# Patient Record
Sex: MALE | Race: BLACK OR AFRICAN AMERICAN | NOT HISPANIC OR LATINO | Employment: UNEMPLOYED | URBAN - METROPOLITAN AREA
[De-identification: names, ages, dates, MRNs, and addresses within clinical notes are randomized per-mention and may not be internally consistent; named-entity substitution may affect disease eponyms.]

---

## 2018-01-07 ENCOUNTER — HOSPITAL ENCOUNTER (EMERGENCY)
Facility: HOSPITAL | Age: 38
Discharge: HOME | End: 2018-01-07
Attending: EMERGENCY MEDICINE
Payer: MEDICAID

## 2018-01-07 VITALS
HEIGHT: 73 IN | HEART RATE: 92 BPM | TEMPERATURE: 98 F | WEIGHT: 170 LBS | BODY MASS INDEX: 22.53 KG/M2 | DIASTOLIC BLOOD PRESSURE: 78 MMHG | SYSTOLIC BLOOD PRESSURE: 118 MMHG | OXYGEN SATURATION: 99 % | RESPIRATION RATE: 17 BRPM
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
## 2018-01-07 DIAGNOSIS — T42.4X1A BENZODIAZEPINE OVERDOSE, ACCIDENTAL OR UNINTENTIONAL, INITIAL ENCOUNTER: ICD-10-CM

## 2018-01-07 DIAGNOSIS — T07.XXXA MULTIPLE ABRASIONS: Primary | ICD-10-CM

## 2018-01-07 PROCEDURE — 63600175 PHARM REV CODE 636 W HCPCS: Performed by: EMERGENCY MEDICINE

## 2018-01-07 PROCEDURE — 90715 TDAP VACCINE 7 YRS/> IM: CPT | Performed by: EMERGENCY MEDICINE

## 2018-01-07 PROCEDURE — 99284 EMERGENCY DEPT VISIT MOD MDM: CPT

## 2018-01-07 PROCEDURE — 90471 IMMUNIZATION ADMIN: CPT | Performed by: EMERGENCY MEDICINE

## 2018-01-07 RX ORDER — SULFAMETHOXAZOLE AND TRIMETHOPRIM 800; 160 MG/1; MG/1
1 TABLET ORAL
Status: ON HOLD | COMMUNITY
End: 2019-08-29 | Stop reason: HOSPADM

## 2018-01-07 RX ORDER — SPIRONOLACTONE 25 MG/1
25 TABLET ORAL
COMMUNITY
End: 2019-09-05

## 2018-01-07 RX ORDER — METFORMIN HYDROCHLORIDE 500 MG/1
500 TABLET ORAL 2 TIMES DAILY WITH MEALS
COMMUNITY
End: 2018-03-04

## 2018-01-07 RX ORDER — LANOLIN ALCOHOL/MO/W.PET/CERES
100 CREAM (GRAM) TOPICAL DAILY
COMMUNITY
End: 2020-02-07 | Stop reason: CLARIF

## 2018-01-07 RX ORDER — ACETAMINOPHEN 500 MG/1
1 CAPSULE, LIQUID FILLED ORAL 4 TIMES DAILY PRN
Status: ON HOLD | COMMUNITY
End: 2019-08-29 | Stop reason: HOSPADM

## 2018-01-07 RX ORDER — PANTOPRAZOLE SODIUM 40 MG/1
40 TABLET, DELAYED RELEASE ORAL DAILY
COMMUNITY
End: 2019-09-05

## 2018-01-07 RX ORDER — TRAZODONE HYDROCHLORIDE 50 MG/1
50 TABLET ORAL NIGHTLY
COMMUNITY
End: 2019-09-05

## 2018-01-07 RX ORDER — LACTULOSE 10 G/15ML
10 SOLUTION ORAL 3 TIMES DAILY
COMMUNITY
End: 2019-09-05

## 2018-01-07 RX ORDER — FUROSEMIDE 20 MG/1
20 TABLET ORAL
COMMUNITY
End: 2019-09-05

## 2018-01-07 RX ORDER — FOLIC ACID 1 MG/1
1 TABLET ORAL DAILY
COMMUNITY
End: 2020-02-07 | Stop reason: CLARIF

## 2018-01-07 RX ADMIN — CLOSTRIDIUM TETANI TOXOID ANTIGEN (FORMALDEHYDE INACTIVATED), CORYNEBACTERIUM DIPHTHERIAE TOXOID ANTIGEN (FORMALDEHYDE INACTIVATED), BORDETELLA PERTUSSIS TOXOID ANTIGEN (GLUTARALDEHYDE INACTIVATED), BORDETELLA PERTUSSIS FILAMENTOUS HEMAGGLUTININ ANTIGEN (FORMALDEHYDE INACTIVATED), BORDETELLA PERTUSSIS PERTACTIN ANTIGEN, AND BORDETELLA PERTUSSIS FIMBRIAE 2/3 ANTIGEN 0.5 ML: 5; 2; 2.5; 5; 3; 5 INJECTION, SUSPENSION INTRAMUSCULAR at 12:01

## 2018-01-07 NOTE — ED NOTES
Pts fiance here to drive pt home. All of pts belongings and medications sent home with pt. Dc and follow up discussed with pt. Pt verbalized understanding. All questions answered. No needs voiced at this time. Pt amb out of ed in nad. Skin pink, warm and dry.

## 2018-01-07 NOTE — ED PROVIDER NOTES
Encounter Date: 1/6/2018    SCRIBE #1 NOTE: ITeresa, anahi scribing for, and in the presence of, Dr. Daly .       History     Chief Complaint   Patient presents with    Fall     Nose and Rt knee pain.  A and O x 4 at present       01/07/2018 1:02 AM     Chief complaint: drug abuse      Flako Quintana is a 37 y.o. male who presents to the ED via EMS with altered mental status secondary to drug abuse. Patient was at home when his girlfriend heard him fall and called EMS because he was not speaking clearly. EMS states patient states he took 6 Xanax because his girlfriend was yelling at him. Patient is a poor historian due to acuity of condition.        The history is provided by the patient and the EMS personnel. No  was used.     Review of patient's allergies indicates:  No Known Allergies  Past Medical History:   Diagnosis Date    Diabetes mellitus      History reviewed. No pertinent surgical history.  History reviewed. No pertinent family history.  Social History   Substance Use Topics    Smoking status: Current Some Day Smoker     Types: Vaping w/o nicotine    Smokeless tobacco: Never Used    Alcohol use Yes     Review of Systems   Reason unable to perform ROS: acuity of condition.       Physical Exam     Initial Vitals [01/07/18 0011]   BP Pulse Resp Temp SpO2   108/65 93 17 97.9 °F (36.6 °C) 100 %      MAP       79.33         Physical Exam    Constitutional: He appears well-nourished.   Patient is drowsy but arousal. Patient arrived in C-Spine immobilization.    HENT:   Head: Normocephalic.   Superficial skin tear to nasal bridge without deviation, bruising, or septal hematoma.    Eyes: Conjunctivae and EOM are normal. Pupils are equal, round, and reactive to light.   Neck: Normal range of motion. Neck supple. No thyroid mass present.   Cardiovascular: Normal rate and regular rhythm.   Abdominal: Soft. Normal appearance and bowel sounds are normal. There is no tenderness.    Neurological: He is alert. He has normal strength. No cranial nerve deficit or sensory deficit.   Skin: Skin is warm and dry. No rash noted. No erythema.   2cm skin tear to R knee without any associated swelling or bruising.       Psychiatric: His speech is normal. Cognition and memory are normal.         ED Course   Procedures  Labs Reviewed - No data to display          Medical Decision Making:   History:   Old Medical Records: I decided to obtain old medical records.  Initial Assessment:   Patient was interviewed and examined emergently.  He is progressing with physical examination hallmarks consistent with benzodiazepine overdose without respiratory depression. VSS. Low suspicion for polysubstance overdose, including ethanol ingestion.  CT scan of the head, face, and cervical spine were obtained and noted be negative for significant bony or intracranial findings.  He has superficial abrasions that were cleaned and do not require suturing. Tetanus updated. He was observed in the emergency department for approximately 8 hours and was able to reach clinical sobriety prior to discharge.  He strongly urged against taking benzodiazepines illicitly or not as prescribed.  He is asked follow-up with his doctor as soon as possible regarding improvement or to return to the ER for any new, concerning, or worsening symptoms.  Patient was agreeable with this plan for follow-up and he was discharged in stable condition with family member as a .  Clinical Tests:   Radiological Study: Ordered and Reviewed            Scribe Attestation:   Scribe #1: I performed the above scribed service and the documentation accurately describes the services I performed. I attest to the accuracy of the note.            ED Course      Clinical Impression:   The primary encounter diagnosis was Multiple abrasions. Diagnoses of Benzodiazepine overdose, accidental or unintentional, initial encounter and Fall on same level from stumbling,  initial encounter were also pertinent to this visit.    Disposition:   Disposition: Discharged  Condition: Stable         I, Dr. Mark Daly, personally performed the services described in this documentation. All medical record entries made by the scribe were at my direction and in my presence.  I have reviewed the chart and agree that the record reflects my personal performance and is accurate and complete. Mark Daly MD.  4:26 AM 02/08/2018                 Mark Daly MD  01/07/18 0645       Mark Daly MD  02/08/18 0426

## 2018-01-07 NOTE — ED NOTES
Attempted to call patients emergency contact x2 for transport home. No answer. Will continue to call

## 2018-03-04 ENCOUNTER — HOSPITAL ENCOUNTER (EMERGENCY)
Facility: HOSPITAL | Age: 38
Discharge: HOME | End: 2018-03-04
Attending: EMERGENCY MEDICINE
Payer: MEDICAID

## 2018-03-04 VITALS
BODY MASS INDEX: 19.37 KG/M2 | DIASTOLIC BLOOD PRESSURE: 61 MMHG | RESPIRATION RATE: 14 BRPM | HEART RATE: 97 BPM | SYSTOLIC BLOOD PRESSURE: 99 MMHG | TEMPERATURE: 99 F | WEIGHT: 146.19 LBS | HEIGHT: 73 IN | OXYGEN SATURATION: 100 %

## 2018-03-04 DIAGNOSIS — R73.9 HYPERGLYCEMIA: ICD-10-CM

## 2018-03-04 LAB
ALBUMIN SERPL BCP-MCNC: 2.3 G/DL
ALP SERPL-CCNC: 540 U/L
ALT SERPL W/O P-5'-P-CCNC: 18 U/L
ANION GAP SERPL CALC-SCNC: 13 MMOL/L
ANISOCYTOSIS BLD QL SMEAR: SLIGHT
AST SERPL-CCNC: 35 U/L
B-OH-BUTYR BLD STRIP-SCNC: 0.6 MMOL/L
BACTERIA #/AREA URNS HPF: ABNORMAL /HPF
BASOPHILS NFR BLD: 2 %
BILIRUB SERPL-MCNC: 3 MG/DL
BILIRUB UR QL STRIP: NEGATIVE
BUN SERPL-MCNC: 8 MG/DL
CALCIUM SERPL-MCNC: 8.4 MG/DL
CHLORIDE SERPL-SCNC: 99 MMOL/L
CLARITY UR: CLEAR
CO2 SERPL-SCNC: 20 MMOL/L
COLOR UR: YELLOW
CREAT SERPL-MCNC: 1.2 MG/DL
DIFFERENTIAL METHOD: ABNORMAL
EOSINOPHIL NFR BLD: 0 %
ERYTHROCYTE [DISTWIDTH] IN BLOOD BY AUTOMATED COUNT: 17.2 %
EST. GFR  (AFRICAN AMERICAN): >60 ML/MIN/1.73 M^2
EST. GFR  (NON AFRICAN AMERICAN): >60 ML/MIN/1.73 M^2
GLUCOSE SERPL-MCNC: 582 MG/DL
GLUCOSE UR QL STRIP: ABNORMAL
HCT VFR BLD AUTO: 30.4 %
HGB BLD-MCNC: 9.6 G/DL
HGB UR QL STRIP: NEGATIVE
HYPOCHROMIA BLD QL SMEAR: ABNORMAL
KETONES UR QL STRIP: NEGATIVE
LEUKOCYTE ESTERASE UR QL STRIP: NEGATIVE
LYMPHOCYTES NFR BLD: 44 %
MCH RBC QN AUTO: 26 PG
MCHC RBC AUTO-ENTMCNC: 31.6 G/DL
MCV RBC AUTO: 82 FL
MICROSCOPIC COMMENT: ABNORMAL
MONOCYTES NFR BLD: 8 %
NEUTROPHILS NFR BLD: 46 %
NITRITE UR QL STRIP: NEGATIVE
OVALOCYTES BLD QL SMEAR: ABNORMAL
PH UR STRIP: 6 [PH] (ref 5–8)
PLATELET # BLD AUTO: 63 K/UL
PMV BLD AUTO: 10.3 FL
POCT GLUCOSE: 370 MG/DL (ref 70–110)
POCT GLUCOSE: 461 MG/DL (ref 70–110)
POIKILOCYTOSIS BLD QL SMEAR: SLIGHT
POTASSIUM SERPL-SCNC: 4 MMOL/L
PROT SERPL-MCNC: 7.5 G/DL
PROT UR QL STRIP: NEGATIVE
RBC # BLD AUTO: 3.7 M/UL
RBC #/AREA URNS HPF: 1 /HPF (ref 0–4)
SODIUM SERPL-SCNC: 132 MMOL/L
SP GR UR STRIP: <=1.005 (ref 1–1.03)
SQUAMOUS #/AREA URNS HPF: 1 /HPF
URN SPEC COLLECT METH UR: ABNORMAL
UROBILINOGEN UR STRIP-ACNC: 1 EU/DL
WBC # BLD AUTO: 3.8 K/UL
WBC #/AREA URNS HPF: 3 /HPF (ref 0–5)
YEAST URNS QL MICRO: ABNORMAL

## 2018-03-04 PROCEDURE — 82010 KETONE BODYS QUAN: CPT

## 2018-03-04 PROCEDURE — 85025 COMPLETE CBC W/AUTO DIFF WBC: CPT

## 2018-03-04 PROCEDURE — 81000 URINALYSIS NONAUTO W/SCOPE: CPT

## 2018-03-04 PROCEDURE — 82962 GLUCOSE BLOOD TEST: CPT

## 2018-03-04 PROCEDURE — 63600175 PHARM REV CODE 636 W HCPCS: Performed by: EMERGENCY MEDICINE

## 2018-03-04 PROCEDURE — 99284 EMERGENCY DEPT VISIT MOD MDM: CPT | Mod: 25

## 2018-03-04 PROCEDURE — 96374 THER/PROPH/DIAG INJ IV PUSH: CPT

## 2018-03-04 PROCEDURE — 80053 COMPREHEN METABOLIC PANEL: CPT

## 2018-03-04 PROCEDURE — 36415 COLL VENOUS BLD VENIPUNCTURE: CPT

## 2018-03-04 PROCEDURE — 25000003 PHARM REV CODE 250: Performed by: PHYSICIAN ASSISTANT

## 2018-03-04 PROCEDURE — 93005 ELECTROCARDIOGRAM TRACING: CPT

## 2018-03-04 PROCEDURE — 93010 ELECTROCARDIOGRAM REPORT: CPT | Mod: ,,, | Performed by: INTERNAL MEDICINE

## 2018-03-04 PROCEDURE — 96361 HYDRATE IV INFUSION ADD-ON: CPT | Mod: 59

## 2018-03-04 RX ORDER — METFORMIN HYDROCHLORIDE 500 MG/1
1000 TABLET ORAL 2 TIMES DAILY WITH MEALS
Qty: 120 TABLET | Refills: 0 | Status: SHIPPED | OUTPATIENT
Start: 2018-03-04 | End: 2019-09-05

## 2018-03-04 RX ADMIN — SODIUM CHLORIDE 1000 ML: 0.9 INJECTION, SOLUTION INTRAVENOUS at 01:03

## 2018-03-04 RX ADMIN — INSULIN HUMAN 6 UNITS: 100 INJECTION, SOLUTION PARENTERAL at 01:03

## 2018-03-04 RX ADMIN — SODIUM CHLORIDE 1000 ML: 0.9 INJECTION, SOLUTION INTRAVENOUS at 11:03

## 2018-03-04 NOTE — DISCHARGE INSTRUCTIONS
Keep a blood sugar log.  Eat a diabetic diet. Do not drink juices. Drink only water.  Take your medication as prescribed.  See your primary care provider as soon as possible.  For worsening symptoms, chest pain, shortness of breath, increased abdominal pain, high grade fever, stroke or stroke like symptoms, immediately go to the nearest Emergency Room or call 911 as soon as possible.

## 2018-03-04 NOTE — ED PROVIDER NOTES
"Encounter Date: 3/4/2018    SCRIBE #1 NOTE: I, Carmen Heriberto, am scribing for, and in the presence of, Harmony Childs PA-C.       History     Chief Complaint   Patient presents with    Hyperglycemia     since Friday with urinary frequency, increased thirst, sob.       03/04/2018  11:42 AM    Chief Complaint: Hyperglycemia      The patient is a 37 y.o. male with PMHx of diabetes mellitus who presents with gradual onset of hyperglycemia for 2 days with associated polydipsia, polyuria, urinary frequency, generalized weakness. His blood glucose read "high" at home. His diabetes is normally controlled by metformin, but he has not seen his doctor in 9 months. He does not follow a diabetic diet. He denied fever, chills or dysuria. No nausea, vomiting or abdominal pain. No PSHx noted.         The history is provided by the patient and the spouse.     Review of patient's allergies indicates:  No Known Allergies  Past Medical History:   Diagnosis Date    Diabetes mellitus      History reviewed. No pertinent surgical history.  History reviewed. No pertinent family history.  Social History   Substance Use Topics    Smoking status: Current Some Day Smoker     Types: Vaping w/o nicotine    Smokeless tobacco: Never Used    Alcohol use Yes     Review of Systems   Constitutional: Negative for activity change, appetite change, chills and fever.   HENT: Negative for congestion, rhinorrhea and sore throat.    Eyes: Negative for redness and visual disturbance.   Respiratory: Negative for cough, chest tightness and shortness of breath.    Cardiovascular: Negative for chest pain.   Gastrointestinal: Negative for abdominal pain, diarrhea, nausea and vomiting.   Endocrine: Positive for polydipsia and polyuria.   Genitourinary: Positive for frequency. Negative for dysuria.   Musculoskeletal: Negative for back pain, neck pain and neck stiffness.   Skin: Negative for rash.   Neurological: Positive for weakness (Generalized). Negative " for dizziness, syncope, numbness and headaches.       Physical Exam     Initial Vitals [03/04/18 1136]   BP Pulse Resp Temp SpO2   133/71 100 14 98.9 °F (37.2 °C) 100 %      MAP       91.67         Physical Exam    Nursing note and vitals reviewed.  Constitutional: Vital signs are normal. He appears well-developed and well-nourished. He is cooperative.  Non-toxic appearance. He does not have a sickly appearance.   HENT:   Head: Normocephalic and atraumatic.   Right Ear: External ear normal.   Left Ear: External ear normal.   Nose: Nose normal.   Mouth/Throat: Oropharynx is clear and moist. Mucous membranes are dry.   Cracked lips.   Eyes: Conjunctivae and lids are normal. Pupils are equal, round, and reactive to light.   Neck: Normal range of motion and full passive range of motion without pain. Neck supple.   Cardiovascular: Normal rate and regular rhythm.   No murmur heard.  Pulmonary/Chest: Breath sounds normal. He has no wheezes. He has no rales.   Abdominal: Soft. Normal appearance. There is no tenderness. There is no rigidity, no rebound and no guarding.   Neurological: He is alert and oriented to person, place, and time.   Skin: Skin is warm, dry and intact. No rash noted.         ED Course   Procedures  Labs Reviewed   CBC W/ AUTO DIFFERENTIAL - Abnormal; Notable for the following:        Result Value    WBC 3.80 (*)     RBC 3.70 (*)     Hemoglobin 9.6 (*)     Hematocrit 30.4 (*)     MCH 26.0 (*)     MCHC 31.6 (*)     RDW 17.2 (*)     Platelets 63 (*)     Basophil% 2.0 (*)     All other components within normal limits   COMPREHENSIVE METABOLIC PANEL - Abnormal; Notable for the following:     Sodium 132 (*)     CO2 20 (*)     Glucose 582 (*)     Calcium 8.4 (*)     Albumin 2.3 (*)     Total Bilirubin 3.0 (*)     Alkaline Phosphatase 540 (*)     All other components within normal limits    Narrative:      Glu  critical result(s) called and verbal readback obtained from   Bhumika Gaitan, 03/04/2018 12:40    URINALYSIS - Abnormal; Notable for the following:     Specific Gravity, UA <=1.005 (*)     Glucose, UA 4+ (*)     All other components within normal limits   BETA - HYDROXYBUTYRATE, SERUM - Abnormal; Notable for the following:     Beta-Hydroxybutyrate 0.6 (*)     All other components within normal limits   URINALYSIS MICROSCOPIC - Abnormal; Notable for the following:     Yeast, UA Rare (*)     All other components within normal limits   POCT GLUCOSE - Abnormal; Notable for the following:     POCT Glucose 461 (*)     All other components within normal limits   POCT GLUCOSE - Abnormal; Notable for the following:     POCT Glucose 370 (*)     All other components within normal limits         Imaging Results          X-Ray Chest PA And Lateral (Final result)  Result time 03/04/18 13:33:55    Final result by Ruth Haider MD (03/04/18 13:33:55)                 Impression:      No acute cardiopulmonary abnormality appreciated radiographically.      Electronically signed by: Joseph Haider MD  Date:    03/04/2018  Time:    13:33             Narrative:    EXAMINATION:  XR CHEST PA AND LATERAL    TECHNIQUE:  PA and lateral views of the chest were performed.    COMPARISON:  None    FINDINGS:  The cardiomediastinal silhouette is normal in appearance.  No pulmonary vascular congestion appreciated. No airspace consolidation or pulmonary mass. No significant volume of pleural fluid or pneumothorax. The bones are unremarkable for age.                                 Medical Decision Making:   History:   Old Medical Records: I decided to obtain old medical records.  Clinical Tests:   Lab Tests: Ordered and Reviewed  Radiological Study: Ordered and Reviewed  Medical Tests: Ordered and Reviewed       APC / Resident Notes:   Urgent evaluation of a 37 year old male who presents with hyperglycemia. He states he has been compliant with his medication but not his diet. He has dry mucous membranes and cracked lips but other normal  "exam. Labs show hyperglycemia with no anion gap. He was given IVF and then 6 units of insulin. Repeat glucose showed improvement. He was given another liter of fluids with continued improvement. Upon re-evaluation he states "I feel so much better". He is only on metformin 500 mg BID, he will need to be increased to 1000 mg BID. I discussed with him regarding his diabetic diet and close follow up with PCP. He voices understanding. Discussed results with patient. Return precautions given. Based on my clinical evaluation, I do not appreciate any immediate, emergent, or life threatening condition or etiology that warrants additional workup today and feel that the patient can be discharged with close follow up care.  Patient is to follow up with their primary care provider. Case was discussed with Dr. Snell who is in agreement with the plan of care. All questions answered.          Scribe Attestation:   Scribe #1: I performed the above scribed service and the documentation accurately describes the services I performed. I attest to the accuracy of the note.    I, Harmony Childs PA-C, personally performed the services described in this documentation. All medical record entries made by the scribe were at my direction and in my presence.  I have reviewed the chart and agree that the record reflects my personal performance and is accurate and complete. Harmony Childs PA-C.  6:40 PM 03/04/2018             Clinical Impression:     1. Uncontrolled type 2 diabetes mellitus with complication, without long-term current use of insulin    2. Hyperglycemia        Disposition:   Disposition: Discharged  Condition: Stable                        Harmony Childs PA-C  03/04/18 1845    "

## 2018-03-05 LAB — POCT GLUCOSE: >500 MG/DL (ref 70–110)

## 2019-08-27 ENCOUNTER — HOSPITAL ENCOUNTER (OUTPATIENT)
Facility: HOSPITAL | Age: 39
Discharge: HOME | End: 2019-08-29
Attending: EMERGENCY MEDICINE | Admitting: HOSPITALIST
Payer: MEDICAID

## 2019-08-27 DIAGNOSIS — T50.901A POLYSUBSTANCE OVERDOSE, ACCIDENTAL OR UNINTENTIONAL, INITIAL ENCOUNTER: ICD-10-CM

## 2019-08-27 DIAGNOSIS — F19.10 POLYSUBSTANCE ABUSE: Primary | ICD-10-CM

## 2019-08-27 DIAGNOSIS — F10.929 ALCOHOLIC INTOXICATION WITH COMPLICATION: ICD-10-CM

## 2019-08-27 DIAGNOSIS — S82.091A OTHER CLOSED FRACTURE OF RIGHT PATELLA, INITIAL ENCOUNTER: ICD-10-CM

## 2019-08-27 DIAGNOSIS — M25.461 EFFUSION OF RIGHT KNEE: ICD-10-CM

## 2019-08-27 DIAGNOSIS — M25.561 RIGHT KNEE PAIN: ICD-10-CM

## 2019-08-27 DIAGNOSIS — R74.01 TRANSAMINITIS: ICD-10-CM

## 2019-08-27 DIAGNOSIS — R41.82 ALTERED MENTAL STATUS: ICD-10-CM

## 2019-08-27 DIAGNOSIS — F10.931: ICD-10-CM

## 2019-08-27 LAB
ALBUMIN SERPL BCP-MCNC: 2.8 G/DL (ref 3.5–5.2)
ALLENS TEST: ABNORMAL
ALP SERPL-CCNC: 221 U/L (ref 55–135)
ALT SERPL W/O P-5'-P-CCNC: 54 U/L (ref 10–44)
AMMONIA PLAS-SCNC: 53 UMOL/L (ref 10–50)
AMPHET+METHAMPHET UR QL: NEGATIVE
ANION GAP SERPL CALC-SCNC: 12 MMOL/L (ref 8–16)
AST SERPL-CCNC: 101 U/L (ref 10–40)
B-OH-BUTYR BLD STRIP-SCNC: 0.4 MMOL/L (ref 0–0.5)
BACTERIA #/AREA URNS HPF: ABNORMAL /HPF
BARBITURATES UR QL SCN>200 NG/ML: NEGATIVE
BASOPHILS NFR BLD: 1 % (ref 0–1.9)
BENZODIAZ UR QL SCN>200 NG/ML: NEGATIVE
BILIRUB SERPL-MCNC: 3.3 MG/DL (ref 0.1–1)
BILIRUB UR QL STRIP: NEGATIVE
BUN SERPL-MCNC: 7 MG/DL (ref 6–20)
BZE UR QL SCN: NEGATIVE
CALCIUM SERPL-MCNC: 9 MG/DL (ref 8.7–10.5)
CANNABINOIDS UR QL SCN: NEGATIVE
CHLORIDE SERPL-SCNC: 106 MMOL/L (ref 95–110)
CLARITY UR: CLEAR
CO2 SERPL-SCNC: 17 MMOL/L (ref 23–29)
COLOR UR: YELLOW
CREAT SERPL-MCNC: 1 MG/DL (ref 0.5–1.4)
CREAT UR-MCNC: 60.5 MG/DL (ref 23–375)
DELSYS: ABNORMAL
DIFFERENTIAL METHOD: ABNORMAL
EOSINOPHIL NFR BLD: 0 % (ref 0–8)
ERYTHROCYTE [DISTWIDTH] IN BLOOD BY AUTOMATED COUNT: 14.3 % (ref 11.5–14.5)
EST. GFR  (AFRICAN AMERICAN): >60 ML/MIN/1.73 M^2
EST. GFR  (NON AFRICAN AMERICAN): >60 ML/MIN/1.73 M^2
ETHANOL SERPL-MCNC: 58 MG/DL
GLUCOSE SERPL-MCNC: 277 MG/DL (ref 70–110)
GLUCOSE UR QL STRIP: ABNORMAL
HCO3 UR-SCNC: 19.3 MMOL/L (ref 24–28)
HCT VFR BLD AUTO: 34.8 % (ref 40–54)
HGB BLD-MCNC: 11.6 G/DL (ref 14–18)
HGB UR QL STRIP: NEGATIVE
IMM GRANULOCYTES # BLD AUTO: ABNORMAL K/UL
KETONES UR QL STRIP: NEGATIVE
LEUKOCYTE ESTERASE UR QL STRIP: NEGATIVE
LYMPHOCYTES NFR BLD: 31 % (ref 18–48)
MCH RBC QN AUTO: 32.6 PG (ref 27–31)
MCHC RBC AUTO-ENTMCNC: 33.3 G/DL (ref 32–36)
MCV RBC AUTO: 98 FL (ref 82–98)
METHADONE UR QL SCN>300 NG/ML: NEGATIVE
MICROSCOPIC COMMENT: ABNORMAL
MODE: ABNORMAL
MONOCYTES NFR BLD: 16 % (ref 4–15)
NEUTROPHILS NFR BLD: 52 % (ref 38–73)
NITRITE UR QL STRIP: NEGATIVE
NRBC BLD-RTO: 0 /100 WBC
OPIATES UR QL SCN: NEGATIVE
PCO2 BLDA: 30.1 MMHG (ref 35–45)
PCP UR QL SCN>25 NG/ML: NEGATIVE
PH SMN: 7.42 [PH] (ref 7.35–7.45)
PH UR STRIP: 6 [PH] (ref 5–8)
PLATELET # BLD AUTO: 54 K/UL (ref 150–350)
PLATELET BLD QL SMEAR: ABNORMAL
PMV BLD AUTO: 12.6 FL (ref 9.2–12.9)
PO2 BLDA: 72 MMHG (ref 40–60)
POC BE: -5 MMOL/L
POC SATURATED O2: 95 % (ref 95–100)
POC TCO2: 20 MMOL/L (ref 24–29)
POTASSIUM SERPL-SCNC: 3.6 MMOL/L (ref 3.5–5.1)
PROT SERPL-MCNC: 7 G/DL (ref 6–8.4)
PROT UR QL STRIP: NEGATIVE
RBC # BLD AUTO: 3.56 M/UL (ref 4.6–6.2)
SAMPLE: ABNORMAL
SITE: ABNORMAL
SODIUM SERPL-SCNC: 135 MMOL/L (ref 136–145)
SP GR UR STRIP: 1.01 (ref 1–1.03)
SQUAMOUS #/AREA URNS HPF: 3 /HPF
TOXICOLOGY INFORMATION: NORMAL
URN SPEC COLLECT METH UR: ABNORMAL
UROBILINOGEN UR STRIP-ACNC: ABNORMAL EU/DL
WBC # BLD AUTO: 3.75 K/UL (ref 3.9–12.7)
WBC #/AREA URNS HPF: 1 /HPF (ref 0–5)
YEAST URNS QL MICRO: ABNORMAL

## 2019-08-27 PROCEDURE — 63600175 PHARM REV CODE 636 W HCPCS: Performed by: EMERGENCY MEDICINE

## 2019-08-27 PROCEDURE — 80307 DRUG TEST PRSMV CHEM ANLYZR: CPT

## 2019-08-27 PROCEDURE — 99900035 HC TECH TIME PER 15 MIN (STAT)

## 2019-08-27 PROCEDURE — 96361 HYDRATE IV INFUSION ADD-ON: CPT | Mod: 59

## 2019-08-27 PROCEDURE — 82010 KETONE BODYS QUAN: CPT

## 2019-08-27 PROCEDURE — 80053 COMPREHEN METABOLIC PANEL: CPT

## 2019-08-27 PROCEDURE — 83036 HEMOGLOBIN GLYCOSYLATED A1C: CPT

## 2019-08-27 PROCEDURE — 36415 COLL VENOUS BLD VENIPUNCTURE: CPT

## 2019-08-27 PROCEDURE — 82140 ASSAY OF AMMONIA: CPT

## 2019-08-27 PROCEDURE — 82803 BLOOD GASES ANY COMBINATION: CPT

## 2019-08-27 PROCEDURE — 80320 DRUG SCREEN QUANTALCOHOLS: CPT

## 2019-08-27 PROCEDURE — 29505 APPLICATION LONG LEG SPLINT: CPT | Mod: RT

## 2019-08-27 PROCEDURE — 85027 COMPLETE CBC AUTOMATED: CPT

## 2019-08-27 PROCEDURE — 99291 CRITICAL CARE FIRST HOUR: CPT | Mod: 25

## 2019-08-27 PROCEDURE — 93005 ELECTROCARDIOGRAM TRACING: CPT

## 2019-08-27 PROCEDURE — 85007 BL SMEAR W/DIFF WBC COUNT: CPT

## 2019-08-27 PROCEDURE — 81000 URINALYSIS NONAUTO W/SCOPE: CPT | Mod: 59

## 2019-08-27 RX ADMIN — SODIUM CHLORIDE 1000 ML: 0.9 INJECTION, SOLUTION INTRAVENOUS at 09:08

## 2019-08-28 PROBLEM — T50.901A POLYSUBSTANCE OVERDOSE: Status: ACTIVE | Noted: 2019-08-28

## 2019-08-28 PROBLEM — F10.931 ALCOHOL WITHDRAWAL WITH DELIRIUM: Status: ACTIVE | Noted: 2019-08-28

## 2019-08-28 PROBLEM — E11.65 TYPE 2 DIABETES MELLITUS WITH HYPERGLYCEMIA, WITH LONG-TERM CURRENT USE OF INSULIN: Status: ACTIVE | Noted: 2019-08-28

## 2019-08-28 PROBLEM — S82.001A CLOSED FRACTURE OF RIGHT PATELLA: Status: ACTIVE | Noted: 2019-08-28

## 2019-08-28 PROBLEM — Z79.4 TYPE 2 DIABETES MELLITUS WITH HYPERGLYCEMIA, WITH LONG-TERM CURRENT USE OF INSULIN: Status: ACTIVE | Noted: 2019-08-28

## 2019-08-28 PROBLEM — K70.9: Status: ACTIVE | Noted: 2019-08-28

## 2019-08-28 PROBLEM — D69.6 THROMBOCYTOPENIA: Status: ACTIVE | Noted: 2019-08-28

## 2019-08-28 PROBLEM — F17.210 CIGARETTE NICOTINE DEPENDENCE WITHOUT COMPLICATION: Status: ACTIVE | Noted: 2019-08-28

## 2019-08-28 LAB
ESTIMATED AVG GLUCOSE: 197 MG/DL (ref 68–131)
ESTIMATED AVG GLUCOSE: 200 MG/DL (ref 68–131)
HBA1C MFR BLD HPLC: 8.5 % (ref 4–5.6)
HBA1C MFR BLD HPLC: 8.6 % (ref 4–5.6)
INR PPP: 1.7 (ref 0.8–1.2)
POCT GLUCOSE: 178 MG/DL (ref 70–110)
POCT GLUCOSE: 235 MG/DL (ref 70–110)
POCT GLUCOSE: 323 MG/DL (ref 70–110)
PROTHROMBIN TIME: 17.4 SEC (ref 9–12.5)

## 2019-08-28 PROCEDURE — G0378 HOSPITAL OBSERVATION PER HR: HCPCS

## 2019-08-28 PROCEDURE — S4991 NICOTINE PATCH NONLEGEND: HCPCS | Performed by: NURSE PRACTITIONER

## 2019-08-28 PROCEDURE — 83036 HEMOGLOBIN GLYCOSYLATED A1C: CPT

## 2019-08-28 PROCEDURE — 25000003 PHARM REV CODE 250: Performed by: EMERGENCY MEDICINE

## 2019-08-28 PROCEDURE — 63600175 PHARM REV CODE 636 W HCPCS: Performed by: NURSE PRACTITIONER

## 2019-08-28 PROCEDURE — 85610 PROTHROMBIN TIME: CPT

## 2019-08-28 PROCEDURE — S5571 INSULIN DISPOS PEN 3 ML: HCPCS | Performed by: NURSE PRACTITIONER

## 2019-08-28 PROCEDURE — 25000003 PHARM REV CODE 250: Performed by: NURSE PRACTITIONER

## 2019-08-28 PROCEDURE — 96372 THER/PROPH/DIAG INJ SC/IM: CPT | Mod: 59

## 2019-08-28 PROCEDURE — 96375 TX/PRO/DX INJ NEW DRUG ADDON: CPT

## 2019-08-28 PROCEDURE — 96374 THER/PROPH/DIAG INJ IV PUSH: CPT

## 2019-08-28 PROCEDURE — 63600175 PHARM REV CODE 636 W HCPCS: Performed by: EMERGENCY MEDICINE

## 2019-08-28 PROCEDURE — 36415 COLL VENOUS BLD VENIPUNCTURE: CPT

## 2019-08-28 RX ORDER — INSULIN ASPART 100 [IU]/ML
1-10 INJECTION, SOLUTION INTRAVENOUS; SUBCUTANEOUS
Status: CANCELLED | OUTPATIENT
Start: 2019-08-28

## 2019-08-28 RX ORDER — CHLORDIAZEPOXIDE HYDROCHLORIDE 10 MG/1
10 CAPSULE, GELATIN COATED ORAL 3 TIMES DAILY
Status: CANCELLED | OUTPATIENT
Start: 2019-08-28

## 2019-08-28 RX ORDER — INSULIN ASPART 100 [IU]/ML
0-5 INJECTION, SOLUTION INTRAVENOUS; SUBCUTANEOUS
Status: DISCONTINUED | OUTPATIENT
Start: 2019-08-28 | End: 2019-08-29 | Stop reason: HOSPADM

## 2019-08-28 RX ORDER — FOLIC ACID 1 MG/1
1 TABLET ORAL DAILY
Status: CANCELLED | OUTPATIENT
Start: 2019-08-28

## 2019-08-28 RX ORDER — ONDANSETRON 2 MG/ML
8 INJECTION INTRAMUSCULAR; INTRAVENOUS EVERY 8 HOURS PRN
Status: DISCONTINUED | OUTPATIENT
Start: 2019-08-28 | End: 2019-08-29 | Stop reason: HOSPADM

## 2019-08-28 RX ORDER — THIAMINE HCL 100 MG
100 TABLET ORAL DAILY
Status: CANCELLED | OUTPATIENT
Start: 2019-08-29

## 2019-08-28 RX ORDER — AMOXICILLIN 250 MG
1 CAPSULE ORAL 2 TIMES DAILY
Status: CANCELLED | OUTPATIENT
Start: 2019-08-28

## 2019-08-28 RX ORDER — IBUPROFEN 200 MG
16 TABLET ORAL
Status: DISCONTINUED | OUTPATIENT
Start: 2019-08-28 | End: 2019-08-29 | Stop reason: HOSPADM

## 2019-08-28 RX ORDER — IBUPROFEN 200 MG
24 TABLET ORAL
Status: DISCONTINUED | OUTPATIENT
Start: 2019-08-28 | End: 2019-08-29 | Stop reason: HOSPADM

## 2019-08-28 RX ORDER — GLUCAGON 1 MG
1 KIT INJECTION
Status: DISCONTINUED | OUTPATIENT
Start: 2019-08-28 | End: 2019-08-29 | Stop reason: HOSPADM

## 2019-08-28 RX ORDER — POTASSIUM CHLORIDE 20 MEQ/15ML
40 SOLUTION ORAL
Status: DISCONTINUED | OUTPATIENT
Start: 2019-08-28 | End: 2019-08-29 | Stop reason: HOSPADM

## 2019-08-28 RX ORDER — PANTOPRAZOLE SODIUM 40 MG/1
40 TABLET, DELAYED RELEASE ORAL DAILY
Status: DISCONTINUED | OUTPATIENT
Start: 2019-08-28 | End: 2019-08-29 | Stop reason: HOSPADM

## 2019-08-28 RX ORDER — GLUCAGON 1 MG
1 KIT INJECTION
Status: DISCONTINUED | OUTPATIENT
Start: 2019-08-28 | End: 2019-08-28 | Stop reason: SDUPTHER

## 2019-08-28 RX ORDER — IBUPROFEN 200 MG
1 TABLET ORAL DAILY
Status: DISCONTINUED | OUTPATIENT
Start: 2019-08-28 | End: 2019-08-29 | Stop reason: HOSPADM

## 2019-08-28 RX ORDER — ACETAMINOPHEN 500 MG
1000 TABLET ORAL EVERY 6 HOURS PRN
Status: DISCONTINUED | OUTPATIENT
Start: 2019-08-28 | End: 2019-08-29 | Stop reason: HOSPADM

## 2019-08-28 RX ORDER — THIAMINE HCL 100 MG
100 TABLET ORAL DAILY
Status: DISCONTINUED | OUTPATIENT
Start: 2019-08-28 | End: 2019-08-29 | Stop reason: HOSPADM

## 2019-08-28 RX ORDER — POTASSIUM CHLORIDE 20 MEQ/15ML
60 SOLUTION ORAL
Status: DISCONTINUED | OUTPATIENT
Start: 2019-08-28 | End: 2019-08-29 | Stop reason: HOSPADM

## 2019-08-28 RX ORDER — LACTULOSE 10 G/15ML
10 SOLUTION ORAL 3 TIMES DAILY
Status: DISCONTINUED | OUTPATIENT
Start: 2019-08-28 | End: 2019-08-29 | Stop reason: HOSPADM

## 2019-08-28 RX ORDER — DIAZEPAM 5 MG/1
10 TABLET ORAL
Status: COMPLETED | OUTPATIENT
Start: 2019-08-28 | End: 2019-08-28

## 2019-08-28 RX ORDER — DIAZEPAM 5 MG/1
5 TABLET ORAL EVERY 6 HOURS PRN
Status: CANCELLED | OUTPATIENT
Start: 2019-08-28

## 2019-08-28 RX ORDER — SODIUM CHLORIDE 0.9 % (FLUSH) 0.9 %
10 SYRINGE (ML) INJECTION
Status: DISCONTINUED | OUTPATIENT
Start: 2019-08-28 | End: 2019-08-29 | Stop reason: HOSPADM

## 2019-08-28 RX ORDER — KETOROLAC TROMETHAMINE 30 MG/ML
15 INJECTION, SOLUTION INTRAMUSCULAR; INTRAVENOUS
Status: COMPLETED | OUTPATIENT
Start: 2019-08-28 | End: 2019-08-28

## 2019-08-28 RX ORDER — TRAZODONE HYDROCHLORIDE 50 MG/1
50 TABLET ORAL NIGHTLY
Status: DISCONTINUED | OUTPATIENT
Start: 2019-08-28 | End: 2019-08-29 | Stop reason: HOSPADM

## 2019-08-28 RX ORDER — FUROSEMIDE 20 MG/1
20 TABLET ORAL
Status: CANCELLED | OUTPATIENT
Start: 2019-08-29

## 2019-08-28 RX ORDER — LANOLIN ALCOHOL/MO/W.PET/CERES
800 CREAM (GRAM) TOPICAL
Status: DISCONTINUED | OUTPATIENT
Start: 2019-08-28 | End: 2019-08-29 | Stop reason: HOSPADM

## 2019-08-28 RX ORDER — IBUPROFEN 200 MG
24 TABLET ORAL
Status: DISCONTINUED | OUTPATIENT
Start: 2019-08-28 | End: 2019-08-28 | Stop reason: SDUPTHER

## 2019-08-28 RX ORDER — LORAZEPAM 1 MG/1
2 TABLET ORAL EVERY 4 HOURS PRN
Status: DISCONTINUED | OUTPATIENT
Start: 2019-08-28 | End: 2019-08-29 | Stop reason: HOSPADM

## 2019-08-28 RX ORDER — SPIRONOLACTONE 25 MG/1
25 TABLET ORAL
Status: DISCONTINUED | OUTPATIENT
Start: 2019-08-29 | End: 2019-08-29 | Stop reason: HOSPADM

## 2019-08-28 RX ORDER — SODIUM CHLORIDE 9 MG/ML
INJECTION, SOLUTION INTRAVENOUS CONTINUOUS
Status: DISCONTINUED | OUTPATIENT
Start: 2019-08-28 | End: 2019-08-29 | Stop reason: HOSPADM

## 2019-08-28 RX ORDER — IBUPROFEN 200 MG
16 TABLET ORAL
Status: DISCONTINUED | OUTPATIENT
Start: 2019-08-28 | End: 2019-08-28 | Stop reason: SDUPTHER

## 2019-08-28 RX ORDER — FOLIC ACID 1 MG/1
1 TABLET ORAL DAILY
Status: DISCONTINUED | OUTPATIENT
Start: 2019-08-28 | End: 2019-08-29 | Stop reason: HOSPADM

## 2019-08-28 RX ADMIN — DIAZEPAM 10 MG: 5 TABLET ORAL at 06:08

## 2019-08-28 RX ADMIN — THERA TABS 1 TABLET: TAB at 04:08

## 2019-08-28 RX ADMIN — Medication 100 MG: at 04:08

## 2019-08-28 RX ADMIN — TRAZODONE HYDROCHLORIDE 50 MG: 50 TABLET ORAL at 08:08

## 2019-08-28 RX ADMIN — FOLIC ACID: 5 INJECTION, SOLUTION INTRAMUSCULAR; INTRAVENOUS; SUBCUTANEOUS at 07:08

## 2019-08-28 RX ADMIN — INSULIN ASPART 2 UNITS: 100 INJECTION, SOLUTION INTRAVENOUS; SUBCUTANEOUS at 04:08

## 2019-08-28 RX ADMIN — PANTOPRAZOLE SODIUM 40 MG: 40 TABLET, DELAYED RELEASE ORAL at 04:08

## 2019-08-28 RX ADMIN — SODIUM CHLORIDE: 0.9 INJECTION, SOLUTION INTRAVENOUS at 04:08

## 2019-08-28 RX ADMIN — INSULIN DETEMIR 10 UNITS: 100 INJECTION, SOLUTION SUBCUTANEOUS at 08:08

## 2019-08-28 RX ADMIN — KETOROLAC TROMETHAMINE 15 MG: 30 INJECTION, SOLUTION INTRAMUSCULAR at 04:08

## 2019-08-28 RX ADMIN — ACETAMINOPHEN 1000 MG: 500 TABLET ORAL at 04:08

## 2019-08-28 RX ADMIN — NICOTINE 1 PATCH: 14 PATCH, EXTENDED RELEASE TRANSDERMAL at 04:08

## 2019-08-28 RX ADMIN — INSULIN ASPART 2 UNITS: 100 INJECTION, SOLUTION INTRAVENOUS; SUBCUTANEOUS at 08:08

## 2019-08-28 RX ADMIN — FOLIC ACID 1 MG: 1 TABLET ORAL at 04:08

## 2019-08-28 RX ADMIN — LORAZEPAM 2 MG: 1 TABLET ORAL at 04:08

## 2019-08-28 NOTE — ASSESSMENT & PLAN NOTE
Maintain fall and seizure precautions.  Initiate low-dose Librium taper and utilize Valium as needed for witnessed withdrawal symptoms.  Initiate banana bag infusion, IVFs.  Multivitamin, folic acid.  Follow electrolytes closely and replete as necessary.  Encourage patient to stop ETOH use.  Will need ongoing reinforcement.

## 2019-08-28 NOTE — SUBJECTIVE & OBJECTIVE
Past Medical History:   Diagnosis Date    Diabetes mellitus        History reviewed. No pertinent surgical history.    Review of patient's allergies indicates:  No Known Allergies    No current facility-administered medications on file prior to encounter.      Current Outpatient Medications on File Prior to Encounter   Medication Sig    acetaminophen (TYLENOL) 500 mg Cap Take 1 capsule by mouth 4 (four) times daily as needed.    folic acid (FOLVITE) 1 MG tablet Take 1 mg by mouth once daily.    furosemide (LASIX) 20 MG tablet Take 20 mg by mouth every Tues, Thurs, Sat.    lactulose (CHRONULAC) 20 gram/30 mL Soln Take 10 g by mouth 3 (three) times daily.    MEN'S MULTI-VITAMIN ORAL Take 1 tablet by mouth once daily.    metFORMIN (GLUCOPHAGE) 500 MG tablet Take 2 tablets (1,000 mg total) by mouth 2 (two) times daily with meals.    pantoprazole (PROTONIX) 40 MG tablet Take 40 mg by mouth once daily.    spironolactone (ALDACTONE) 25 MG tablet Take 25 mg by mouth every Tues, Thurs, Sat.    sulfamethoxazole-trimethoprim 800-160mg (BACTRIM DS) 800-160 mg Tab Take 1 tablet by mouth every 12 (twelve) hours.    thiamine (VITAMIN B-1) 100 MG tablet Take 100 mg by mouth once daily.    traZODone (DESYREL) 50 MG tablet Take 50 mg by mouth every evening.     Family History     Problem Relation (Age of Onset)    Diabetes Mother    No Known Problems Father        Tobacco Use    Smoking status: Current Every Day Smoker     Packs/day: 0.50     Types: Vaping w/o nicotine    Smokeless tobacco: Never Used   Substance and Sexual Activity    Alcohol use: Yes     Alcohol/week: 16.8 oz     Types: 28 Cans of beer per week    Drug use: Yes    Sexual activity: Not on file     Review of Systems   Constitutional: Negative for activity change, appetite change, chills, fatigue and fever.   HENT: Negative for congestion, postnasal drip, sore throat and trouble swallowing.    Eyes: Negative for photophobia and visual disturbance.    Respiratory: Negative for cough, shortness of breath and wheezing.    Cardiovascular: Positive for leg swelling. Negative for chest pain and palpitations.   Gastrointestinal: Negative for abdominal distention, constipation, diarrhea and vomiting.   Genitourinary: Negative for difficulty urinating, flank pain and hematuria.   Musculoskeletal: Positive for arthralgias and gait problem.   Skin: Negative for color change.   Neurological: Negative for dizziness, weakness, light-headedness and headaches.   Psychiatric/Behavioral: Positive for confusion and hallucinations. Negative for agitation. The patient is not nervous/anxious.      Objective:     Vital Signs (Most Recent):  Temp: 98.9 °F (37.2 °C) (08/27/19 2142)  Pulse: 86 (08/28/19 0601)  Resp: (!) 97 (08/27/19 2142)  BP: (!) 126/92 (08/28/19 0601)  SpO2: 99 % (08/28/19 0601) Vital Signs (24h Range):  Temp:  [98.9 °F (37.2 °C)] 98.9 °F (37.2 °C)  Pulse:  [72-88] 86  Resp:  [97] 97  SpO2:  [97 %-100 %] 99 %  BP: ()/(61-96) 126/92     Weight: 66.2 kg (146 lb)  Body mass index is 19.26 kg/m².    Physical Exam   Constitutional: He appears well-developed and well-nourished. No distress.   HENT:   Head: Normocephalic and atraumatic.   Eyes: Pupils are equal, round, and reactive to light. Conjunctivae and EOM are normal.   Neck: Normal range of motion. Neck supple. No thyromegaly present.   Cardiovascular: Normal rate, regular rhythm, normal heart sounds and intact distal pulses.   No murmur heard.  Pulmonary/Chest: Effort normal and breath sounds normal. No respiratory distress.   Abdominal: Soft. Bowel sounds are normal. He exhibits no distension.   Musculoskeletal: Normal range of motion. He exhibits edema (Right knee effusion) and tenderness ( right knee).   Neurological: He is alert. No cranial nerve deficit or sensory deficit.   Oriented person place and situation.  Disoriented to time.  No tremors.   Skin: Skin is warm and dry. Capillary refill takes less  than 2 seconds. No erythema.   Psychiatric: He has a normal mood and affect. His behavior is normal.   Hallucinating/seeing spiders and lives words climbing on the walls.  No auditory hallucinations.         CRANIAL NERVES     CN III, IV, VI   Pupils are equal, round, and reactive to light.  Extraocular motions are normal.        Significant Labs:   CBC:   Recent Labs   Lab 08/27/19  2200   WBC 3.75*   HGB 11.6*   HCT 34.8*   PLT 54*     CMP:   Recent Labs   Lab 08/27/19  2200   *   K 3.6      CO2 17*   *   BUN 7   CREATININE 1.0   CALCIUM 9.0   PROT 7.0   ALBUMIN 2.8*   BILITOT 3.3*   ALKPHOS 221*   *   ALT 54*   ANIONGAP 12   EGFRNONAA >60     Urine Studies:   Recent Labs   Lab 08/27/19  2302   COLORU Yellow   APPEARANCEUA Clear   PHUR 6.0   SPECGRAV 1.010   PROTEINUA Negative   GLUCUA 4+*   KETONESU Negative   BILIRUBINUA Negative   OCCULTUA Negative   NITRITE Negative   UROBILINOGEN 4.0-6.0*   LEUKOCYTESUR Negative   WBCUA 1   BACTERIA None   SQUAMEPITHEL 3       CT Head: negative.
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
## 2019-08-28 NOTE — ASSESSMENT & PLAN NOTE
Chronic, acutely hyperglycemic.  Pt reports glucose in the 200s at home.  Continue his basal insulin with twice daily dosing as per home regimen.  Hold metformin acutely.  Treat hyperglycemia as required using sliding scale insulin.  Initiate pre meal insulin once we are sure he is eating.  Check hemoglobin A1c to better gauge glycemic control.

## 2019-08-28 NOTE — ASSESSMENT & PLAN NOTE
Pt presented acutely intoxicated after taking multiple medications and drinking ETOH.  No evidence of suicidal intent.  His respiratory status is stable.  Counseled Pt to avoid illicit substances.  Counseled Pt to avoid combining potentially sedating medications to ETOH.

## 2019-08-28 NOTE — PLAN OF CARE
Problem: Adult Inpatient Plan of Care  Goal: Plan of Care Review  Outcome: Ongoing (interventions implemented as appropriate)  Patient resting quietly. sr up x 3,call light in reach. Dt precautions in place. Will continue to monitor   08/28/19 1520   Plan of Care Review   Plan of Care Reviewed With patient

## 2019-08-28 NOTE — SUBJECTIVE & OBJECTIVE
Interval History: Patient reports feeling a little better this afternoon.  Did report having hallucinations this morning and talking to himself.  Further reports having some intermittent pain to right knee, but has improved with immobilization.  He denied tremors, but stated that he has had DT's in the past.  Denied chest pain, sob, abdominal pain, n/v/d.      Review of Systems   Constitutional: Negative for activity change, appetite change, chills, fatigue and fever.   HENT: Negative for congestion, hearing loss, sore throat and trouble swallowing.    Eyes: Negative for photophobia and visual disturbance.   Respiratory: Negative for cough, shortness of breath and wheezing.    Cardiovascular: Negative for chest pain, palpitations and leg swelling.   Gastrointestinal: Negative for abdominal pain, diarrhea, nausea and vomiting.   Endocrine: Negative for polydipsia, polyphagia and polyuria.   Genitourinary: Negative for difficulty urinating, dysuria, frequency and urgency.   Musculoskeletal: Positive for arthralgias (Right knee pain) and gait problem. Negative for neck pain and neck stiffness.   Skin: Negative for rash and wound.   Neurological: Negative for dizziness, weakness, numbness and headaches.   Psychiatric/Behavioral: Positive for confusion and hallucinations. The patient is not nervous/anxious.      Objective:     Vital Signs (Most Recent):  Temp: 98.1 °F (36.7 °C) (08/28/19 0827)  Pulse: 88 (08/28/19 0827)  Resp: 20 (08/28/19 0827)  BP: (!) 142/87 (08/28/19 0827)  SpO2: 99 % (08/28/19 0827) Vital Signs (24h Range):  Temp:  [98.1 °F (36.7 °C)-98.9 °F (37.2 °C)] 98.1 °F (36.7 °C)  Pulse:  [72-88] 88  Resp:  [20-97] 20  SpO2:  [97 %-100 %] 99 %  BP: ()/(61-96) 142/87     Weight: 75.1 kg (165 lb 9 oz)  Body mass index is 21.84 kg/m².    Intake/Output Summary (Last 24 hours) at 8/28/2019 1446  Last data filed at 8/27/2019 2240  Gross per 24 hour   Intake 1000 ml   Output --   Net 1000 ml      Physical Exam    Constitutional: He is oriented to person, place, and time. He appears well-developed and well-nourished. No distress.   HENT:   Head: Normocephalic and atraumatic.   Mouth/Throat: Oropharynx is clear and moist.   Eyes: Pupils are equal, round, and reactive to light. Conjunctivae and EOM are normal.   Neck: Normal range of motion. Neck supple. No tracheal deviation present. No thyromegaly present.   Cardiovascular: Normal rate, regular rhythm, normal heart sounds and intact distal pulses. Exam reveals no gallop and no friction rub.   No murmur heard.  Pulses:       Dorsalis pedis pulses are 2+ on the right side, and 2+ on the left side.   Pulmonary/Chest: Effort normal and breath sounds normal. No accessory muscle usage. No respiratory distress. He has no wheezes. He has no rhonchi. He has no rales.   Abdominal: Soft. Bowel sounds are normal. He exhibits no distension and no mass. There is no tenderness.   Musculoskeletal: Normal range of motion. He exhibits no edema, tenderness or deformity.   Neurological: He is alert and oriented to person, place, and time. He has normal strength.   Skin: Skin is warm, dry and intact. Capillary refill takes less than 2 seconds. No rash noted. He is not diaphoretic. No erythema.   Psychiatric: He has a normal mood and affect. His speech is normal and behavior is normal. He is actively hallucinating. He expresses no homicidal and no suicidal ideation. He exhibits abnormal recent memory.   Vitals reviewed.      Significant Labs:   CBC:   Recent Labs   Lab 08/27/19  2200   WBC 3.75*   HGB 11.6*   HCT 34.8*   PLT 54*     CMP:   Recent Labs   Lab 08/27/19  2200   *   K 3.6      CO2 17*   *   BUN 7   CREATININE 1.0   CALCIUM 9.0   PROT 7.0   ALBUMIN 2.8*   BILITOT 3.3*   ALKPHOS 221*   *   ALT 54*   ANIONGAP 12   EGFRNONAA >60     Magnesium: No results for input(s): MG in the last 48 hours.  Urine Studies:   Recent Labs   Lab 08/27/19  2302   COLORU Yellow    APPEARANCEUA Clear   PHUR 6.0   SPECGRAV 1.010   PROTEINUA Negative   GLUCUA 4+*   KETONESU Negative   BILIRUBINUA Negative   OCCULTUA Negative   NITRITE Negative   UROBILINOGEN 4.0-6.0*   LEUKOCYTESUR Negative   WBCUA 1   BACTERIA None   SQUAMEPITHEL 3       Significant Imaging: I have reviewed all pertinent imaging results/findings within the past 24 hours.

## 2019-08-28 NOTE — HOSPITAL COURSE
Patient was monitored closely during his stay.  He was admitted with acute delirium secondary to ETOH withdrawal and delirium s/p fall and fracture of his right patella.  He was placed in a knee immobilizer and was started on a Librium taper and given multivitamins.  He was evaluated by Orthopedic surgery and follow up was arranged with Dr. Jaffe.  PT evaluated and treated patient, recommending discharge with home health, PT and a walker for ambulation, which was discussed with CM. His mental clarity improved and hallucinations resolved. He was counseled extensively on abstaining from ETOH use and was given RX for Librium.      Physical exam:    Gen'l: AAOx4, CLARK to command  Cardiac:  RRR, no murmur, no gallops, peripheral pulses 2+  Pulm:  CTA, no adventitious breath sounds  Abd: soft, non-distended, BS x 4  Ext:  Right knee immobilizer in place

## 2019-08-28 NOTE — ASSESSMENT & PLAN NOTE
ED workup significant for right patellar fracture with fusion.  Knee immobilizer was ordered.  Pain control as needed.  Consult with PT OT-Pt high fall risk.  Will need to follow up with orthopedist.

## 2019-08-28 NOTE — PLAN OF CARE
CM met with pt bedside to complete discharge assessment. Pt had hard time completing assessment- due to drowsiness. Pt stated his address recently changed, CM updated in epic. PCP is Dr. Lim. [facility] is [facility] [address]. Pt denies POA/LW. Pt reports being independent with all ADLs and is able to drive himself to appts. DC plan is home with no needs. CM following.      08/28/19 1431   Discharge Assessment   Assessment Type Discharge Planning Assessment   Confirmed/corrected address and phone number on facesheet? Yes   Assessment information obtained from? Patient   Communicated expected length of stay with patient/caregiver yes   Prior to hospitilization cognitive status: Alert/Oriented   Prior to hospitalization functional status: Independent   Current cognitive status: Alert/Oriented   Current Functional Status: Independent   Facility Arrived From: home   Able to Return to Prior Arrangements yes   Is patient able to care for self after discharge? Yes   Patient's perception of discharge disposition home or selfcare   Readmission Within the Last 30 Days no previous admission in last 30 days   Patient currently being followed by outpatient case management? No   Patient currently receives any other outside agency services? No   Equipment Currently Used at Home crutches   Do you have any problems affording any of your prescribed medications? No   Is the patient taking medications as prescribed? yes   Does the patient have transportation home? Yes   Transportation Anticipated family or friend will provide   Does the patient receive services at the [facility]? No   Discharge Plan A Home   DME Needed Upon Discharge  none   Patient/Family in Agreement with Plan yes

## 2019-08-28 NOTE — ASSESSMENT & PLAN NOTE
Maintain fall and seizure precautions.  Initiate low-dose Librium taper and utilize lorazepam as needed for witnessed withdrawal symptoms. Continue IV hydration.  Multivitamins, folic acid, thiamine.  Follow electrolytes closely and replete as necessary.  Encourage patient to stop ETOH use.  Will need ongoing reinforcement.

## 2019-08-28 NOTE — NURSING
Pt unable to stay awake to answer admit questions, when awake he is a poor historian, unable to answer history and medications, will attempt to ask questions again, will continue to monitor, safety maintained

## 2019-08-28 NOTE — ASSESSMENT & PLAN NOTE
Chronic, relatively stable in the setting of liver disease.  No acute bleeding noted.  Follow platelet count closely and avoid any anti thrombotics.

## 2019-08-28 NOTE — ED NOTES
Pt. Resting with eyes closed, NADN, VSS, awakes to stimuli, chest rise and fall symmetrical, respirations even and unlabored, will continue to monitor.
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
## 2019-08-28 NOTE — ASSESSMENT & PLAN NOTE
Health hazards associated with cigarette smoking were reviewed with patient and cessation was encouraged. Nicotine replacement and counseling options were discussed.  Spent 3 minutes counseling on cessation, patient not ready to quit.  Nicotine patch ordered.

## 2019-08-28 NOTE — ASSESSMENT & PLAN NOTE
Pt with chronic liver disease associated with alcoholism and hep C status.  Holding lasix for now and giving IV hydration.  Follow liver function test completely count closely.  Avoid nonessential hepatic toxins.  Counseled Pt to stop ETOH.  Needs to follow up with hepatologist and get treated for his hep C.  His ammonia is stable.  Will continue his lactulose.  We will check INR to better evaluate his synthetic function.  Unable to calculate MELD with current available labs.

## 2019-08-28 NOTE — ED NOTES
Pt. Resting with eyes closed, chest rise and fall symmetrical, respirations even and unlabored, VSS, NADN, will continue to monitor.

## 2019-08-28 NOTE — ASSESSMENT & PLAN NOTE
Pt with chronic liver disease associated with alcoholism and hep C status.  Continue his diuretics per outpatient regimen.  Follow liver function test completely count closely.  Avoid nonessential hepatic toxins.  Counseled Pt to stop ETOH.  Needs to follow up with hepatologist and get treated for his hep C.  His ammonia is stable.  Will continue his lactulose.  We will check INR to better evaluate his synthetic function.  Unable to calculate MELD with current available labs.
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
## 2019-08-28 NOTE — ASSESSMENT & PLAN NOTE
Xray concerning for right patella fracture with large lipohemarthrosis.  Knee immobilizer was ordered.  Pain control as needed.  Consult with PT OT-Pt high fall risk. Consultation to Orthopedic Surgery for further evaluation.

## 2019-08-29 ENCOUNTER — TELEPHONE (OUTPATIENT)
Dept: ORTHOPEDICS | Facility: CLINIC | Age: 39
End: 2019-08-29

## 2019-08-29 VITALS
HEART RATE: 94 BPM | DIASTOLIC BLOOD PRESSURE: 89 MMHG | OXYGEN SATURATION: 99 % | SYSTOLIC BLOOD PRESSURE: 143 MMHG | HEIGHT: 73 IN | TEMPERATURE: 98 F | BODY MASS INDEX: 21.94 KG/M2 | RESPIRATION RATE: 16 BRPM | WEIGHT: 165.56 LBS

## 2019-08-29 PROBLEM — T50.901A POLYSUBSTANCE OVERDOSE: Status: RESOLVED | Noted: 2019-08-28 | Resolved: 2019-08-29

## 2019-08-29 PROBLEM — F10.931 ALCOHOL WITHDRAWAL WITH DELIRIUM: Status: RESOLVED | Noted: 2019-08-28 | Resolved: 2019-08-29

## 2019-08-29 LAB
ALBUMIN SERPL BCP-MCNC: 2.5 G/DL (ref 3.5–5.2)
ALP SERPL-CCNC: 189 U/L (ref 55–135)
ALT SERPL W/O P-5'-P-CCNC: 40 U/L (ref 10–44)
AMMONIA PLAS-SCNC: 57 UMOL/L (ref 10–50)
ANION GAP SERPL CALC-SCNC: 8 MMOL/L (ref 8–16)
AST SERPL-CCNC: 60 U/L (ref 10–40)
BASOPHILS NFR BLD: 0 % (ref 0–1.9)
BILIRUB SERPL-MCNC: 4 MG/DL (ref 0.1–1)
BUN SERPL-MCNC: 7 MG/DL (ref 6–20)
CALCIUM SERPL-MCNC: 8.2 MG/DL (ref 8.7–10.5)
CHLORIDE SERPL-SCNC: 108 MMOL/L (ref 95–110)
CO2 SERPL-SCNC: 20 MMOL/L (ref 23–29)
CREAT SERPL-MCNC: 0.7 MG/DL (ref 0.5–1.4)
DIFFERENTIAL METHOD: ABNORMAL
EOSINOPHIL NFR BLD: 2 % (ref 0–8)
ERYTHROCYTE [DISTWIDTH] IN BLOOD BY AUTOMATED COUNT: 14.6 % (ref 11.5–14.5)
EST. GFR  (AFRICAN AMERICAN): >60 ML/MIN/1.73 M^2
EST. GFR  (NON AFRICAN AMERICAN): >60 ML/MIN/1.73 M^2
GLUCOSE SERPL-MCNC: 180 MG/DL (ref 70–110)
HCT VFR BLD AUTO: 36 % (ref 40–54)
HGB BLD-MCNC: 11.9 G/DL (ref 14–18)
IMM GRANULOCYTES # BLD AUTO: ABNORMAL K/UL
INR PPP: 1.8 (ref 0.8–1.2)
LYMPHOCYTES NFR BLD: 40 % (ref 18–48)
MAGNESIUM SERPL-MCNC: 1.3 MG/DL (ref 1.6–2.6)
MAGNESIUM SERPL-MCNC: 1.3 MG/DL (ref 1.6–2.6)
MCH RBC QN AUTO: 32.3 PG (ref 27–31)
MCHC RBC AUTO-ENTMCNC: 33.1 G/DL (ref 32–36)
MCV RBC AUTO: 98 FL (ref 82–98)
MONOCYTES NFR BLD: 17 % (ref 4–15)
NEUTROPHILS NFR BLD: 41 % (ref 38–73)
NRBC BLD-RTO: 0 /100 WBC
PLATELET # BLD AUTO: 41 K/UL (ref 150–350)
PLATELET BLD QL SMEAR: ABNORMAL
PMV BLD AUTO: 12.3 FL (ref 9.2–12.9)
POCT GLUCOSE: 197 MG/DL (ref 70–110)
POCT GLUCOSE: 213 MG/DL (ref 70–110)
POTASSIUM SERPL-SCNC: 3.5 MMOL/L (ref 3.5–5.1)
PROT SERPL-MCNC: 6.4 G/DL (ref 6–8.4)
PROTHROMBIN TIME: 18.6 SEC (ref 9–12.5)
RBC # BLD AUTO: 3.68 M/UL (ref 4.6–6.2)
SODIUM SERPL-SCNC: 136 MMOL/L (ref 136–145)
WBC # BLD AUTO: 4.06 K/UL (ref 3.9–12.7)

## 2019-08-29 PROCEDURE — 80053 COMPREHEN METABOLIC PANEL: CPT

## 2019-08-29 PROCEDURE — 85027 COMPLETE CBC AUTOMATED: CPT

## 2019-08-29 PROCEDURE — S5571 INSULIN DISPOS PEN 3 ML: HCPCS | Performed by: NURSE PRACTITIONER

## 2019-08-29 PROCEDURE — 85610 PROTHROMBIN TIME: CPT

## 2019-08-29 PROCEDURE — G8988 SELF CARE GOAL STATUS: HCPCS | Mod: CJ

## 2019-08-29 PROCEDURE — 36415 COLL VENOUS BLD VENIPUNCTURE: CPT

## 2019-08-29 PROCEDURE — 85007 BL SMEAR W/DIFF WBC COUNT: CPT

## 2019-08-29 PROCEDURE — 83735 ASSAY OF MAGNESIUM: CPT

## 2019-08-29 PROCEDURE — 97116 GAIT TRAINING THERAPY: CPT

## 2019-08-29 PROCEDURE — 25000003 PHARM REV CODE 250: Performed by: NURSE PRACTITIONER

## 2019-08-29 PROCEDURE — 97162 PT EVAL MOD COMPLEX 30 MIN: CPT

## 2019-08-29 PROCEDURE — G8979 MOBILITY GOAL STATUS: HCPCS | Mod: CJ

## 2019-08-29 PROCEDURE — 97535 SELF CARE MNGMENT TRAINING: CPT

## 2019-08-29 PROCEDURE — 63600175 PHARM REV CODE 636 W HCPCS: Performed by: NURSE PRACTITIONER

## 2019-08-29 PROCEDURE — G8987 SELF CARE CURRENT STATUS: HCPCS | Mod: CK

## 2019-08-29 PROCEDURE — 82140 ASSAY OF AMMONIA: CPT

## 2019-08-29 PROCEDURE — G8978 MOBILITY CURRENT STATUS: HCPCS | Mod: CK

## 2019-08-29 PROCEDURE — 96372 THER/PROPH/DIAG INJ SC/IM: CPT

## 2019-08-29 PROCEDURE — G0378 HOSPITAL OBSERVATION PER HR: HCPCS

## 2019-08-29 PROCEDURE — 97165 OT EVAL LOW COMPLEX 30 MIN: CPT

## 2019-08-29 PROCEDURE — S4991 NICOTINE PATCH NONLEGEND: HCPCS | Performed by: NURSE PRACTITIONER

## 2019-08-29 RX ORDER — CHLORDIAZEPOXIDE HYDROCHLORIDE 10 MG/1
10 CAPSULE, GELATIN COATED ORAL 4 TIMES DAILY
Status: DISCONTINUED | OUTPATIENT
Start: 2019-08-29 | End: 2019-08-29 | Stop reason: HOSPADM

## 2019-08-29 RX ORDER — CHLORDIAZEPOXIDE HYDROCHLORIDE 10 MG/1
10 CAPSULE, GELATIN COATED ORAL 4 TIMES DAILY
Qty: 40 CAPSULE | Refills: 0 | Status: SHIPPED | OUTPATIENT
Start: 2019-08-29 | End: 2019-09-05

## 2019-08-29 RX ORDER — CHLORDIAZEPOXIDE HYDROCHLORIDE 10 MG/1
10 CAPSULE, GELATIN COATED ORAL 4 TIMES DAILY
Qty: 40 CAPSULE | Refills: 0 | Status: SHIPPED | OUTPATIENT
Start: 2019-08-29 | End: 2019-08-29

## 2019-08-29 RX ADMIN — SPIRONOLACTONE 25 MG: 25 TABLET ORAL at 06:08

## 2019-08-29 RX ADMIN — LORAZEPAM 2 MG: 1 TABLET ORAL at 09:08

## 2019-08-29 RX ADMIN — NICOTINE 1 PATCH: 14 PATCH, EXTENDED RELEASE TRANSDERMAL at 09:08

## 2019-08-29 RX ADMIN — CHLORDIAZEPOXIDE HYDROCHLORIDE 10 MG: 10 CAPSULE ORAL at 06:08

## 2019-08-29 RX ADMIN — INSULIN DETEMIR 10 UNITS: 100 INJECTION, SOLUTION SUBCUTANEOUS at 09:08

## 2019-08-29 RX ADMIN — Medication 100 MG: at 09:08

## 2019-08-29 RX ADMIN — PANTOPRAZOLE SODIUM 40 MG: 40 TABLET, DELAYED RELEASE ORAL at 09:08

## 2019-08-29 RX ADMIN — ACETAMINOPHEN 1000 MG: 500 TABLET ORAL at 09:08

## 2019-08-29 RX ADMIN — ACETAMINOPHEN 1000 MG: 500 TABLET ORAL at 02:08

## 2019-08-29 RX ADMIN — FOLIC ACID 1 MG: 1 TABLET ORAL at 09:08

## 2019-08-29 RX ADMIN — THERA TABS 1 TABLET: TAB at 09:08

## 2019-08-29 RX ADMIN — CHLORDIAZEPOXIDE HYDROCHLORIDE 10 MG: 10 CAPSULE ORAL at 02:08

## 2019-08-29 NOTE — PLAN OF CARE
Problem: Physical Therapy Goal  Goal: Physical Therapy Goal  Goals to be met by: 2019     Patient will increase functional independence with mobility by performin. Supine to sit with Contact Guard Assistance  2. Sit to stand transfer with Contact Guard Assistance  3. Bed to chair transfer with Contact Guard Assistance using Rolling Walker  4. Gait  x 150 feet with Contact Guard Assistance using Rolling Walker. NWB RLE  5. Lower extremity exercise program x20 reps   Outcome: Ongoing (interventions implemented as appropriate)  PT eval and treat completed, gait 60ft with RW min assist NWB RLE

## 2019-08-29 NOTE — PLAN OF CARE
Problem: Occupational Therapy Goal  Goal: Occupational Therapy Goal  Goals to be met by: 9/8/2019     Patient will increase functional independence with ADLs by performing:    LE Dressing with Contact Guard Assistance and Assistive Devices as needed.  Grooming while seated at sink with Supervision.  Toileting from bedside commode with Supervision for hygiene and clothing management.   Supine to sit with Supervision.  Toilet transfer to bedside commode with Contact Guard Assistance and maintaining weight-bearing precaution(s).  Upper extremity exercise program x10 reps per handout, with independence.    Outcome: Ongoing (interventions implemented as appropriate)  OT evaluation completed today. Goals & care plan established.    Chan Cotto, OT  8/29/2019

## 2019-08-29 NOTE — PLAN OF CARE
Problem: Adult Inpatient Plan of Care  Goal: Plan of Care Review  Outcome: Ongoing (interventions implemented as appropriate)  Pt in bed resting. Able to make needs and wants known. No complaints or distress noted. Pt used urinal through out the night. Pt  Safety maintained. No pain noted at this time. Safety maintained will continue to monitor. Pt free of withdrawal symptoms this shift.

## 2019-08-29 NOTE — PT/OT/SLP EVAL
Physical Therapy Evaluation    Patient Name:  Flako Quintana   MRN:  [id number]    Recommendations:     Discharge Recommendations:  home health PT   Discharge Equipment Recommendations: walker, rolling   Barriers to discharge: None    Assessment:     Flako Quintana is a 38 y.o. male admitted with a medical diagnosis of Alcohol withdrawal with delirium.  He presents with the following impairments/functional limitations:  weakness, impaired endurance, impaired functional mobilty, impaired self care skills, decreased lower extremity function, impaired balance, pain, orthopedic precautions . Pt ambulated at hallways with RW with several cueing for NWB and for safety, presents with gait instability. Pt to benefit from RW at discharge and HHPT.    Rehab Prognosis: Fair; patient would benefit from acute skilled PT services to address these deficits and reach maximum level of function.    Recent Surgery: * No surgery found *      Plan:     During this hospitalization, patient to be seen daily to address the identified rehab impairments via gait training, therapeutic activities, therapeutic exercises and progress toward the following goals:    · Plan of Care Expires:  09/27/19    Subjective   NP statd to observe NWB RLE till seen by ortho  Chief Complaint: pain RK-   Patient/Family Comments/goals: get home  Pain/Comfort:  · Pain Rating 1: (did not rate)  · Location - Side 1: Right  · Location 1: knee  · Pain Addressed 1: Reposition, Distraction, Cessation of Activity    Patients cultural, spiritual, Holiness conflicts given the current situation:      Living Environment:  Home with SO  Has ~16 steps to second floor apartment with rails  Prior to admission, patients level of function was ambulatory/independent.  Equipment used at home: crutches, axillary.  DME owned (not currently used): none.  Upon discharge, patient will have assistance from SO.    Objective:     Communicated with nurse Shore and APOLINAR Javier prior to  session.  Patient found HOB elevated with knee immobilizer  upon PT entry to room.    General Precautions: Standard, fall   Orthopedic Precautions:RLE non weight bearing   Braces: Knee immobilizer     Exams:  · Postural Exam:  Patient presented with the following abnormalities:    · -       Rounded shoulders  · -       Forward head  · -       tall  · RLE ROM: Deficits: knee immobilizer  · RLE Strength: 3-/5  · LLE ROM: WFL  · LLE Strength: WFL    Functional Mobility:  · Bed Mobility:     · Rolling Left:  minimum assistance  · Scooting: minimum assistance  · Supine to Sit: minimum assistance  · Sit to Supine: minimum assistance  · Transfers:     · Sit to Stand:  minimum assistance with rolling walker  · Gait: 60ft with RW min assist with NWB RLE- several rest stands- educated on safety      Therapeutic Activities and Exercises:   Pt returned to bed post PT  Encouraged ankle pumping exercises    AM-PAC 6 CLICK MOBILITY  Total Score:16     Patient left HOB elevated with all lines intact, call button in reach, bed alarm on and phlebotomist present.    GOALS:   Multidisciplinary Problems     Physical Therapy Goals        Problem: Physical Therapy Goal    Goal Priority Disciplines Outcome Goal Variances Interventions   Physical Therapy Goal     PT, PT/OT Ongoing (interventions implemented as appropriate)     Description:  Goals to be met by: 2019     Patient will increase functional independence with mobility by performin. Supine to sit with Contact Guard Assistance  2. Sit to stand transfer with Contact Guard Assistance  3. Bed to chair transfer with Contact Guard Assistance using Rolling Walker  4. Gait  x 150 feet with Contact Guard Assistance using Rolling Walker. NWB RLE  5. Lower extremity exercise program x20 reps                     History:     Past Medical History:   Diagnosis Date    Diabetes mellitus        History reviewed. No pertinent surgical history.    Time Tracking:     PT Received On:  08/29/19  PT Start Time: 0934     PT Stop Time: 1001  PT Total Time (min): 27 min     Billable Minutes: Evaluation 10 and Gait Training 17      Lauren Falcon, PT  08/29/2019

## 2019-08-29 NOTE — TELEPHONE ENCOUNTER
----- Message from Evangelina Hermosillo MA sent at 8/29/2019  8:01 AM CDT -----  Contact: Isabel javed   Room [address]  Dx R christian FX lipohemarthrosis  daya Stanton MD   Admitted  08/28/2019   Call back

## 2019-08-29 NOTE — PLAN OF CARE
Hospital follow up scheduled with PCP. AVS updated.      08/29/19 1150   Discharge Assessment   Assessment Type Discharge Planning Reassessment

## 2019-08-29 NOTE — PT/OT/SLP EVAL
"Occupational Therapy   Evaluation    Name: Flako Quintana  MRN: [id number]  Admitting Diagnosis:  Alcohol withdrawal with delirium      Recommendations:     Discharge Recommendations: home health PT  Discharge Equipment Recommendations:  walker, rolling  Barriers to discharge:  Inaccessible home environment    Assessment:     Flako Quintana is a 38 y.o. male with a medical diagnosis of Alcohol withdrawal with delirium.   Performance deficits affecting function: weakness, impaired self care skills, decreased lower extremity function, orthopedic precautions, gait instability, impaired functional mobilty, impaired balance, decreased ROM, decreased safety awareness, impaired endurance, decreased coordination. He presents with decreased independence with dressing, toileting, and transfers due to R LE injury. Pt also demonstrated moderate unsteadiness with bed>BSC transfer requiring mod A using RW. Pt able to perform grooming tasks while seated EOB.      Rehab Prognosis: Good; patient would benefit from acute skilled OT services to address these deficits and reach maximum level of function.       Plan:     Patient to be seen 3 x/week to address the above listed problems via self-care/home management, therapeutic activities, therapeutic exercises  · Plan of Care Expires: 09/08/19  · Plan of Care Reviewed with: patient, significant other    Subjective     Chief Complaint: R knee pain  Patient/Family Comments/goals: "Stop the pain."    Occupational Profile:  Living Environment: Pt lives with significant other in a 2nd floor apt. No elevator access.  Previous level of function: Pt was independent with ADLs and mobility prior to injury.  Equipment Used at Home:  crutches, axillary  Assistance upon Discharge: Pt will receive assistance from significant other.    Pain/Comfort:  Pain Rating 1: 8/10  Location - Side 1: Right  Location - Orientation 1: generalized  Location 1: knee  Pain Addressed 1: Reposition, Distraction, " Pre-medicate for activity, Nurse notified  Pain Rating Post-Intervention 1: 8/10    Patients cultural, spiritual, Christianity conflicts given the current situation:      Objective:     Communicated with: nurse Shore prior to session.  Patient found HOB elevated with knee immobilizer upon OT entry to room.    General Precautions: Standard, fall   Orthopedic Precautions:RLE non weight bearing   Braces: Knee immobilizer     Occupational Performance:    Bed Mobility:    · Patient completed Scooting/Bridging with contact guard assistance  · Patient completed Supine to Sit with contact guard assistance  · Patient completed Sit to Supine with contact guard assistance    Functional Mobility/Transfers:  · Patient completed Sit <> Stand Transfer with moderate assistance  with  rolling walker   · Patient completed Bed <> Bedside Commode Transfer using Stand Pivot technique with moderate assistance with rolling walker    Activities of Daily Living:  · Grooming: supervision with all grooming tasks while seated EOB.   · Lower Body Dressing: pt able to don/doff L sock but requires Max A for R sock     Cognitive/Visual Perceptual:  Cognitive/Psychosocial Skills:     -       Oriented to: x4   -       Follows Commands/attention:Follows multistep  commands  -       Communication: clear/fluent  -       Safety awareness/insight to disability: impaired   -       Mood/Affect/Coping skills/emotional control: Appropriate to situation and Cooperative  Visual/Perceptual:      -Intact     Physical Exam:  Postural examination/scapula alignment:    -       Rounded shoulders  -       Forward head  Upper Extremity Range of Motion:     -       Right Upper Extremity: WFL  -       Left Upper Extremity: WFL  Upper Extremity Strength:    -       Right Upper Extremity: 3+/5  -       Left Upper Extremity: 3+/5   Strength:    -       Right Upper Extremity: WNL  -       Left Upper Extremity: WNL  Fine Motor Coordination:    -       Intact  Gross motor  coordination:   WFL in B UE    AMPAC 6 Click ADL:  AMPAC Total Score: 19    Treatment & Education:  OT ed patient on safety with walker use for functional mobility with cues for hand placement & sequencing.   OT ed pt on OT role & POC as well as discharge recommendations.  OT issued red theraband to pt & educated pt on B UE resistive HEP.     Education:    Patient left HOB elevated with all lines intact, call button in reach and nurse notified    GOALS:   Multidisciplinary Problems     Occupational Therapy Goals        Problem: Occupational Therapy Goal    Goal Priority Disciplines Outcome Interventions   Occupational Therapy Goal     OT, PT/OT Ongoing (interventions implemented as appropriate)    Description:  Goals to be met by: 9/8/2019     Patient will increase functional independence with ADLs by performing:    LE Dressing with Contact Guard Assistance and Assistive Devices as needed.  Grooming while seated at sink with Supervision.  Toileting from bedside commode with Supervision for hygiene and clothing management.   Supine to sit with Supervision.  Toilet transfer to bedside commode with Contact Guard Assistance and maintaining weight-bearing precaution(s).  Upper extremity exercise program x10 reps per handout, with independence.                      History:     Past Medical History:   Diagnosis Date    Diabetes mellitus        History reviewed. No pertinent surgical history.    Time Tracking:     OT Date of Treatment: 08/29/19  OT Start Time: 0914  OT Stop Time: 0934  OT Total Time (min): 20 min    Billable Minutes:Evaluation 10  Self Care/Home Management 10    Chan Cotto OT  8/29/2019

## 2019-08-29 NOTE — PLAN OF CARE
CM put in ambulatory referral to outpt therapy per APOLINAR Angel.     08/29/19 1431   Discharge Assessment   Assessment Type Discharge Planning Reassessment

## 2019-08-29 NOTE — PLAN OF CARE
08/29/19 1457   Final Note   Assessment Type Final Discharge Note   Anticipated Discharge Disposition Home   Hospital Follow Up  Appt(s) scheduled? Yes

## 2019-08-29 NOTE — TELEPHONE ENCOUNTER
----- Message from Evangelina Hermosillo MA sent at 8/29/2019 12:09 PM CDT -----  Contact: [facility]   Calling to schedule aptp  FX patella   Call pt at home to schedule   [phone]

## 2019-08-29 NOTE — PLAN OF CARE
CM attempted to schedule hospital follow up with Dr. Jaffe. Per Evangelina ([facility] ), Dr. Jaffe's office will call pt with an appt.      08/29/19 0539   Discharge Assessment   Assessment Type Discharge Planning Reassessment

## 2019-08-29 NOTE — PLAN OF CARE
I received approval from Willa Allen with [facility] to pull the pts walker. I delivered to the pts bedside. Pt signed the delivery ticket and completed paperwork returned to DME closet. Moriah Scott, HUA     08/29/19 1220   Post-Acute Status   Post-Acute Authorization [facility] Status Set-up Complete

## 2019-08-30 ENCOUNTER — TELEPHONE (OUTPATIENT)
Dept: MEDSURG UNIT | Facility: HOSPITAL | Age: 39
End: 2019-08-30
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
## 2019-08-30 NOTE — NURSING
Discharge instructions and education reviewed with pt. Telemetry box and leads removed. Peripheral IV removed with catheter intact.  Pt to follow up with [facility] outpatient in 8 days to further assess right patella. Pt going home with Walker and immobilizer to Right leg. Pt awaiting transportation home.

## 2019-09-05 ENCOUNTER — HOSPITAL ENCOUNTER (OUTPATIENT)
Facility: HOSPITAL | Age: 39
Discharge: HOME | End: 2019-09-06
Attending: EMERGENCY MEDICINE | Admitting: INTERNAL MEDICINE
Payer: MEDICAID

## 2019-09-05 DIAGNOSIS — R40.4 ALTERED AWARENESS, TRANSIENT: ICD-10-CM

## 2019-09-05 DIAGNOSIS — R55 SYNCOPE: ICD-10-CM

## 2019-09-05 DIAGNOSIS — G37.2 CENTRAL PONTINE MYELINOLYSIS: ICD-10-CM

## 2019-09-05 DIAGNOSIS — R73.9 HYPERGLYCEMIA: Primary | ICD-10-CM

## 2019-09-05 PROBLEM — G93.40 ACUTE ENCEPHALOPATHY: Status: ACTIVE | Noted: 2019-09-05

## 2019-09-05 LAB
ALBUMIN SERPL BCP-MCNC: 2.8 G/DL (ref 3.5–5.2)
ALP SERPL-CCNC: 154 U/L (ref 55–135)
ALT SERPL W/O P-5'-P-CCNC: 27 U/L (ref 10–44)
AMPHET+METHAMPHET UR QL: NEGATIVE
ANION GAP SERPL CALC-SCNC: 9 MMOL/L (ref 8–16)
APAP SERPL-MCNC: <10 UG/ML (ref 10–20)
AST SERPL-CCNC: 53 U/L (ref 10–40)
BACTERIA #/AREA URNS HPF: NEGATIVE /HPF
BARBITURATES UR QL SCN>200 NG/ML: NORMAL
BASOPHILS # BLD AUTO: 0.08 K/UL (ref 0–0.2)
BASOPHILS NFR BLD: 1.7 % (ref 0–1.9)
BENZODIAZ UR QL SCN>200 NG/ML: NORMAL
BILIRUB SERPL-MCNC: 3.6 MG/DL (ref 0.1–1)
BILIRUB UR QL STRIP: NEGATIVE
BUN SERPL-MCNC: 6 MG/DL (ref 6–20)
BZE UR QL SCN: NEGATIVE
CALCIUM SERPL-MCNC: 8.8 MG/DL (ref 8.7–10.5)
CANNABINOIDS UR QL SCN: NEGATIVE
CHLORIDE SERPL-SCNC: 102 MMOL/L (ref 95–110)
CLARITY UR: CLEAR
CO2 SERPL-SCNC: 21 MMOL/L (ref 23–29)
COLOR UR: YELLOW
CREAT SERPL-MCNC: 0.9 MG/DL (ref 0.5–1.4)
CREAT UR-MCNC: 102 MG/DL (ref 23–375)
DIFFERENTIAL METHOD: ABNORMAL
EOSINOPHIL # BLD AUTO: 0.2 K/UL (ref 0–0.5)
EOSINOPHIL NFR BLD: 3.6 % (ref 0–8)
ERYTHROCYTE [DISTWIDTH] IN BLOOD BY AUTOMATED COUNT: 14.8 % (ref 11.5–14.5)
EST. GFR  (AFRICAN AMERICAN): >60 ML/MIN/1.73 M^2
EST. GFR  (NON AFRICAN AMERICAN): >60 ML/MIN/1.73 M^2
ETHANOL SERPL-MCNC: <5 MG/DL
GLUCOSE SERPL-MCNC: 168 MG/DL (ref 70–110)
GLUCOSE SERPL-MCNC: 182 MG/DL (ref 70–110)
GLUCOSE SERPL-MCNC: 340 MG/DL (ref 70–110)
GLUCOSE UR QL STRIP: ABNORMAL
HCT VFR BLD AUTO: 36.9 % (ref 40–54)
HGB BLD-MCNC: 12 G/DL (ref 14–18)
HGB UR QL STRIP: NEGATIVE
HYALINE CASTS #/AREA URNS LPF: 2 /LPF
IMM GRANULOCYTES # BLD AUTO: 0.01 K/UL (ref 0–0.04)
IMM GRANULOCYTES NFR BLD AUTO: 0.2 % (ref 0–0.5)
KETONES UR QL STRIP: NEGATIVE
LDH SERPL L TO P-CCNC: 1.77 MMOL/L (ref 0.5–2.2)
LEUKOCYTE ESTERASE UR QL STRIP: ABNORMAL
LYMPHOCYTES # BLD AUTO: 1.5 K/UL (ref 1–4.8)
LYMPHOCYTES NFR BLD: 32 % (ref 18–48)
MCH RBC QN AUTO: 31.7 PG (ref 27–31)
MCHC RBC AUTO-ENTMCNC: 32.5 G/DL (ref 32–36)
MCV RBC AUTO: 98 FL (ref 82–98)
MICROSCOPIC COMMENT: ABNORMAL
MONOCYTES # BLD AUTO: 0.7 K/UL (ref 0.3–1)
MONOCYTES NFR BLD: 15.3 % (ref 4–15)
NEUTROPHILS # BLD AUTO: 2.3 K/UL (ref 1.8–7.7)
NEUTROPHILS NFR BLD: 47.2 % (ref 38–73)
NITRITE UR QL STRIP: NEGATIVE
NRBC BLD-RTO: 0 /100 WBC
OPIATES UR QL SCN: NEGATIVE
PCP UR QL SCN>25 NG/ML: NEGATIVE
PH UR STRIP: 8 [PH] (ref 5–8)
PLATELET # BLD AUTO: 130 K/UL (ref 150–350)
PMV BLD AUTO: 11.9 FL (ref 9.2–12.9)
POTASSIUM SERPL-SCNC: 4.5 MMOL/L (ref 3.5–5.1)
PROT SERPL-MCNC: 7.2 G/DL (ref 6–8.4)
PROT UR QL STRIP: NEGATIVE
RBC # BLD AUTO: 3.78 M/UL (ref 4.6–6.2)
RBC #/AREA URNS HPF: 4 /HPF (ref 0–4)
SALICYLATES SERPL-MCNC: <4 MG/DL (ref 15–30)
SAMPLE: NORMAL
SODIUM SERPL-SCNC: 132 MMOL/L (ref 136–145)
SP GR UR STRIP: 1.02 (ref 1–1.03)
SQUAMOUS #/AREA URNS HPF: 3 /HPF
TOXICOLOGY INFORMATION: NORMAL
TROPONIN I SERPL DL<=0.01 NG/ML-MCNC: <0.03 NG/ML (ref 0.02–0.04)
URN SPEC COLLECT METH UR: ABNORMAL
UROBILINOGEN UR STRIP-ACNC: ABNORMAL EU/DL
WBC # BLD AUTO: 4.78 K/UL (ref 3.9–12.7)
WBC #/AREA URNS HPF: 33 /HPF (ref 0–5)
YEAST URNS QL MICRO: ABNORMAL

## 2019-09-05 PROCEDURE — 80307 DRUG TEST PRSMV CHEM ANLYZR: CPT

## 2019-09-05 PROCEDURE — 87086 URINE CULTURE/COLONY COUNT: CPT

## 2019-09-05 PROCEDURE — 85025 COMPLETE CBC W/AUTO DIFF WBC: CPT

## 2019-09-05 PROCEDURE — 93005 ELECTROCARDIOGRAM TRACING: CPT

## 2019-09-05 PROCEDURE — 63600175 PHARM REV CODE 636 W HCPCS: Performed by: EMERGENCY MEDICINE

## 2019-09-05 PROCEDURE — 80053 COMPREHEN METABOLIC PANEL: CPT

## 2019-09-05 PROCEDURE — 96372 THER/PROPH/DIAG INJ SC/IM: CPT | Mod: 59

## 2019-09-05 PROCEDURE — G0378 HOSPITAL OBSERVATION PER HR: HCPCS

## 2019-09-05 PROCEDURE — 25000003 PHARM REV CODE 250: Performed by: INTERNAL MEDICINE

## 2019-09-05 PROCEDURE — 96361 HYDRATE IV INFUSION ADD-ON: CPT

## 2019-09-05 PROCEDURE — 80307 DRUG TEST PRSMV CHEM ANLYZR: CPT | Mod: 59

## 2019-09-05 PROCEDURE — 99291 CRITICAL CARE FIRST HOUR: CPT

## 2019-09-05 PROCEDURE — 87147 CULTURE TYPE IMMUNOLOGIC: CPT | Mod: 59

## 2019-09-05 PROCEDURE — 94761 N-INVAS EAR/PLS OXIMETRY MLT: CPT

## 2019-09-05 PROCEDURE — 63600175 PHARM REV CODE 636 W HCPCS: Performed by: INTERNAL MEDICINE

## 2019-09-05 PROCEDURE — 80320 DRUG SCREEN QUANTALCOHOLS: CPT

## 2019-09-05 PROCEDURE — 84484 ASSAY OF TROPONIN QUANT: CPT

## 2019-09-05 PROCEDURE — 81001 URINALYSIS AUTO W/SCOPE: CPT

## 2019-09-05 PROCEDURE — 96360 HYDRATION IV INFUSION INIT: CPT

## 2019-09-05 PROCEDURE — 83605 ASSAY OF LACTIC ACID: CPT

## 2019-09-05 RX ORDER — CYCLOBENZAPRINE HYDROCHLORIDE 7.5 MG/1
10 TABLET, FILM COATED ORAL 3 TIMES DAILY PRN
COMMUNITY
End: 2020-02-07 | Stop reason: CLARIF

## 2019-09-05 RX ORDER — THIAMINE HCL 100 MG
100 TABLET ORAL DAILY
Status: DISCONTINUED | OUTPATIENT
Start: 2019-09-06 | End: 2019-09-06 | Stop reason: HOSPADM

## 2019-09-05 RX ORDER — FUROSEMIDE 20 MG/1
20 TABLET ORAL
Status: DISCONTINUED | OUTPATIENT
Start: 2019-09-07 | End: 2019-09-06 | Stop reason: HOSPADM

## 2019-09-05 RX ORDER — TRAMADOL HYDROCHLORIDE 50 MG/1
50 TABLET ORAL EVERY 12 HOURS PRN
COMMUNITY
End: 2020-02-07 | Stop reason: CLARIF

## 2019-09-05 RX ORDER — POTASSIUM CHLORIDE 20 MEQ/15ML
60 SOLUTION ORAL
Status: DISCONTINUED | OUTPATIENT
Start: 2019-09-05 | End: 2019-09-06 | Stop reason: HOSPADM

## 2019-09-05 RX ORDER — POTASSIUM CHLORIDE 20 MEQ/15ML
40 SOLUTION ORAL
Status: DISCONTINUED | OUTPATIENT
Start: 2019-09-05 | End: 2019-09-06 | Stop reason: HOSPADM

## 2019-09-05 RX ORDER — INSULIN ASPART 100 [IU]/ML
0-5 INJECTION, SOLUTION INTRAVENOUS; SUBCUTANEOUS
Status: DISCONTINUED | OUTPATIENT
Start: 2019-09-05 | End: 2019-09-06 | Stop reason: HOSPADM

## 2019-09-05 RX ORDER — POLYETHYLENE GLYCOL 3350 17 G/17G
17 POWDER, FOR SOLUTION ORAL DAILY
Status: DISCONTINUED | OUTPATIENT
Start: 2019-09-05 | End: 2019-09-06 | Stop reason: HOSPADM

## 2019-09-05 RX ORDER — GABAPENTIN 600 MG/1
600 TABLET ORAL NIGHTLY
COMMUNITY
End: 2020-02-07 | Stop reason: CLARIF

## 2019-09-05 RX ORDER — FOLIC ACID 1 MG/1
1 TABLET ORAL DAILY
Status: DISCONTINUED | OUTPATIENT
Start: 2019-09-06 | End: 2019-09-06 | Stop reason: HOSPADM

## 2019-09-05 RX ORDER — GLUCAGON 1 MG
1 KIT INJECTION
Status: DISCONTINUED | OUTPATIENT
Start: 2019-09-05 | End: 2019-09-06 | Stop reason: HOSPADM

## 2019-09-05 RX ORDER — LANOLIN ALCOHOL/MO/W.PET/CERES
800 CREAM (GRAM) TOPICAL
Status: DISCONTINUED | OUTPATIENT
Start: 2019-09-05 | End: 2019-09-06 | Stop reason: HOSPADM

## 2019-09-05 RX ORDER — IBUPROFEN 200 MG
16 TABLET ORAL
Status: DISCONTINUED | OUTPATIENT
Start: 2019-09-05 | End: 2019-09-06 | Stop reason: HOSPADM

## 2019-09-05 RX ORDER — OXYCODONE AND ACETAMINOPHEN 10; 325 MG/1; MG/1
1 TABLET ORAL EVERY 4 HOURS PRN
Status: ON HOLD | COMMUNITY
End: 2019-12-09 | Stop reason: HOSPADM

## 2019-09-05 RX ORDER — ENOXAPARIN SODIUM 100 MG/ML
40 INJECTION SUBCUTANEOUS EVERY 24 HOURS
Status: DISCONTINUED | OUTPATIENT
Start: 2019-09-05 | End: 2019-09-06 | Stop reason: HOSPADM

## 2019-09-05 RX ORDER — INSULIN GLARGINE 100 [IU]/ML
23 INJECTION, SOLUTION SUBCUTANEOUS NIGHTLY
COMMUNITY
End: 2020-02-07 | Stop reason: CLARIF

## 2019-09-05 RX ORDER — GABAPENTIN 300 MG/1
300 CAPSULE ORAL ONCE
Status: COMPLETED | OUTPATIENT
Start: 2019-09-05 | End: 2019-09-05

## 2019-09-05 RX ORDER — ASPIRIN 325 MG
325 TABLET ORAL
Status: ACTIVE | OUTPATIENT
Start: 2019-09-05 | End: 2019-09-06

## 2019-09-05 RX ORDER — IBUPROFEN 200 MG
24 TABLET ORAL
Status: DISCONTINUED | OUTPATIENT
Start: 2019-09-05 | End: 2019-09-06 | Stop reason: HOSPADM

## 2019-09-05 RX ADMIN — ENOXAPARIN SODIUM 40 MG: 100 INJECTION SUBCUTANEOUS at 08:09

## 2019-09-05 RX ADMIN — GABAPENTIN 300 MG: 300 CAPSULE ORAL at 10:09

## 2019-09-05 RX ADMIN — SODIUM CHLORIDE 1000 ML: 0.9 INJECTION, SOLUTION INTRAVENOUS at 12:09

## 2019-09-05 NOTE — ED NOTES
Pt resting quietly. No acute distress noted. Denies any needs at this time. Will continue to monitor.

## 2019-09-05 NOTE — ED NOTES
Pt resting quietly. No acute distress noted. Denies any needs at this time. Will continue to monitor.   Stable
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
## 2019-09-05 NOTE — ED PROVIDER NOTES
"Encounter Date: 9/5/2019       History     Chief Complaint   Patient presents with    Loss of Consciousness     patient report dizziness with syncopal episodes 5-6 days. Pt was seen at [facility] for same symptoms. EMS reports SBP 89 upon arrival. pt slurring words- patient taking any medications for "broken right knee"     38-year-old male presented emergency department with syncopal episodes.  EMS at patient keeps passing out.  Patient on multiple medication for pain and to prevent alcohol withdrawals.  Patient was recently admitted to the hospital for alcohol-related problems.  Patient is awake but keeps falling asleep every few minutes.  Patient wakes up and answers all questions but keeps going back to sleep.  Patient has constricted pupils bilaterally.  Patient denies any pain.  Denies fever or chills or nausea or vomiting however he keeps falling asleep.        Review of patient's allergies indicates:  No Known Allergies  Past Medical History:   Diagnosis Date    Diabetes mellitus      No past surgical history on file.  Family History   Problem Relation Age of Onset    Diabetes Mother     No Known Problems Father      Social History     Tobacco Use    Smoking status: Current Every Day Smoker     Packs/day: 0.50     Types: Vaping w/o nicotine    Smokeless tobacco: Never Used   Substance Use Topics    Alcohol use: Yes     Alcohol/week: 16.8 oz     Types: 28 Cans of beer per week    Drug use: Yes     Review of Systems   Constitutional: Negative.  Negative for fever.   HENT: Negative.    Eyes: Negative.    Respiratory: Negative.    Cardiovascular: Negative.    Gastrointestinal: Negative.    Endocrine: Negative.    Genitourinary: Negative.    Skin: Negative.    Allergic/Immunologic: Negative.    Neurological: Positive for dizziness and syncope.        Generalized weakness   Hematological: Negative.    Psychiatric/Behavioral: Negative.    All other systems reviewed and are negative.      Physical Exam "     Initial Vitals   BP Pulse Resp Temp SpO2   09/05/19 1149 09/05/19 1150 09/05/19 1150 09/05/19 1150 09/05/19 1150   115/77 67 16 97.7 °F (36.5 °C) 99 %      MAP       --                Physical Exam    Nursing note and vitals reviewed.  Constitutional: He appears well-developed and well-nourished.   HENT:   Head: Normocephalic and atraumatic.   Nose: Nose normal.   Eyes: Conjunctivae and EOM are normal. Right eye exhibits no discharge. Left eye exhibits no discharge.   Pinpoint pupils   Neck: Normal range of motion. Neck supple. No tracheal deviation present.   Cardiovascular: Normal rate, regular rhythm, normal heart sounds and intact distal pulses. Exam reveals no friction rub.    No murmur heard.  Pulmonary/Chest: Breath sounds normal. No respiratory distress. He has no wheezes. He has no rales.   Abdominal: Soft. He exhibits no distension. There is no tenderness.   Musculoskeletal: Normal range of motion.   Neurological: He is oriented to person, place, and time. He has normal strength and normal reflexes. He displays normal reflexes. No cranial nerve deficit or sensory deficit. GCS score is 15. GCS eye subscore is 4. GCS verbal subscore is 5. GCS motor subscore is 6.   Patient extremely sleepy however wakes up and answers questions but goes back to sleep.  Eyes rolled back at times but patient awake and responsive and no visual gaze deficits and can follow commands and follow the finger without any difficulty in all directions   Skin: Skin is warm and dry. Capillary refill takes less than 2 seconds.   Psychiatric: He has a normal mood and affect. Thought content normal.         ED Course   Critical Care  Date/Time: 9/5/2019 3:50 PM  Performed by: Mitzy Fallon MD  Authorized by: Mitzy Fallon MD   Direct patient critical care time: 25 minutes  Consulting other physicians critical care time: 10 minutes  Total critical care time (exclusive of procedural time) : 35 minutes        Labs Reviewed   CBC W/ AUTO  DIFFERENTIAL - Abnormal; Notable for the following components:       Result Value    RBC 3.78 (*)     Hemoglobin 12.0 (*)     Hematocrit 36.9 (*)     Mean Corpuscular Hemoglobin 31.7 (*)     RDW 14.8 (*)     Platelets 130 (*)     Mono% 15.3 (*)     All other components within normal limits   COMPREHENSIVE METABOLIC PANEL - Abnormal; Notable for the following components:    Sodium 132 (*)     CO2 21 (*)     Glucose 340 (*)     Albumin 2.8 (*)     Total Bilirubin 3.6 (*)     Alkaline Phosphatase 154 (*)     AST 53 (*)     All other components within normal limits   URINALYSIS, REFLEX TO URINE CULTURE - Abnormal; Notable for the following components:    Glucose, UA 4+ (*)     Urobilinogen, UA 4.0-6.0 (*)     Leukocytes, UA 2+ (*)     All other components within normal limits    Narrative:     Preferred Collection Type->Urine, Clean Catch  Specimen Source->Urine   SALICYLATE LEVEL - Abnormal; Notable for the following components:    Salicylate Lvl <4.0 (*)     All other components within normal limits   URINALYSIS MICROSCOPIC - Abnormal; Notable for the following components:    WBC, UA 33 (*)     Hyaline Casts, UA 2 (*)     All other components within normal limits    Narrative:     Preferred Collection Type->Urine, Clean Catch  Specimen Source->Urine   CULTURE, URINE   TROPONIN I   ALCOHOL,MEDICAL (ETHANOL)   ACETAMINOPHEN LEVEL   DRUG SCREEN PANEL, URINE EMERGENCY    Narrative:     Preferred Collection Type->Urine, Clean Catch  Specimen Source->Urine   BARBITURATES SCREEN, URINE   ISTAT LACTATE   POCT LACTATE     EKG Readings: (Independently Interpreted)   Rhythm: Normal Sinus Rhythm. Ectopy: No Ectopy. Conduction: Normal. ST Segments: Normal ST Segments. T Waves: Normal. Clinical Impression: Normal Sinus Rhythm     ECG Results          EKG 12-lead (In process)  Result time 09/05/19 12:14:14    In process by Interface, Lab In [facility] (09/05/19 12:14:14)                 Narrative:    Test Reason : R40.4,    Vent. Rate  : 069 BPM     Atrial Rate : 069 BPM     P-R Int : 132 ms          QRS Dur : 080 ms      QT Int : 416 ms       P-R-T Axes : 064 -07 027 degrees     QTc Int : 445 ms    Normal sinus rhythm  Nonspecific ST abnormality  Abnormal ECG  When compared with ECG of 05-SEP-2019 11:53,  No significant change was found    Referred By:             Confirmed By:                             Imaging Results          CT Head Without Contrast (Final result)  Result time 09/05/19 13:19:17    Final result by Francisco Cho MD (09/05/19 13:19:17)                 Impression:      1. 12 mm ill-defined hypoattenuating focus centrally within the farnaz, nonspecific.  While similar in appearance to August 27, 2019, this is not identified on prior studies from December 2017 and January 2018.  Potential differential possibilities are noted above.  Follow-up MR should be considered for further assessment..  2. No other significant findings.      Electronically signed by: Francisco Cho MD  Date:    09/05/2019  Time:    13:19             Narrative:    EXAMINATION:  CT HEAD WITHOUT CONTRAST    CLINICAL HISTORY:  Dizziness, syncopal episode    TECHNIQUE:  CMS MANDATED QUALITY DATA - CT RADIATION - 436    All CT scans at this facility utilize dose modulation, iterative reconstruction, and/or weight based dosing when appropriate to reduce radiation dose to as low as reasonably achievable.    Non infusion images were obtained from the skull base to the vertex.    COMPARISON:  August 27, 2019    FINDINGS:  There is no evidence of intracranial mass, hemorrhage, or midline shift.  Ventricles and sulci are within normal limits.  There are no pathologic extra-axial fluid collections.    There is no CT evidence of cortical ischemic change.  12 mm ill-defined hypoattenuating focus is noted centrally within the farnaz.  This is similar in appearance to a recent prior study from August 27, 2019, but not identified on a prior study from January 2018.   Etiology is undetermined.  Differential possibilities would include ischemic involvement and central pontine myelinolysis.  Follow-up MR should be considered.    The cerebellum and brainstem are unremarkable.                               X-Ray Chest AP Portable (Final result)  Result time 09/05/19 12:14:37    Final result by Francisco Cho MD (09/05/19 12:14:37)                 Impression:      1. No acute radiographic abnormalities.      Electronically signed by: Francisco Cho MD  Date:    09/05/2019  Time:    12:14             Narrative:    EXAMINATION:  XR CHEST AP PORTABLE    CLINICAL HISTORY:  Syncope    COMPARISON:  May 2018    FINDINGS:  Heart size is normal.  The mediastinum is unremarkable.  The lungs are clear.  No acute osseous abnormalities are identified.                              X-Rays:   Independently Interpreted Readings:   Head CT: Central pontine hypodensity noted     Medical Decision Making:   Differential Diagnosis:   38-year-old male with decreased level of consciousness.  History of multiple syncopal episodes and patient passing out and eyes rolling back.  Patient currently being treated for possible alcohol withdrawals.  Patient is extremely sleepy and this possibly could be secondary to medication overdose and polypharmacy however CT scan shows evidence of central pontine hypodensity and differential diagnosis includes ischemic given to versus central pontine myelinolysis.  Case discussed with neurologist and to do MRI in the hospital.  Hospital Medicine consulted for evaluation for admission.  Patient did have improvement of mental status and patient is nonfocal neurologically at this time.  Patient is also a poorly-controlled diabetic and hyperglycemia to be treated as well.  Hospital Medicine consult for evaluation for admission  Clinical Tests:   Lab Tests: Reviewed  Radiological Study: Reviewed  Medical Tests: Reviewed                      Clinical Impression:        ICD-10-CM ICD-9-CM   1. Hyperglycemia R73.9 790.29   2. Syncope R55 780.2   3. 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

## 2019-09-05 NOTE — SUBJECTIVE & OBJECTIVE
Past Medical History:   Diagnosis Date    Diabetes mellitus        No past surgical history on file.    Review of patient's allergies indicates:  No Known Allergies    No current facility-administered medications on file prior to encounter.      Current Outpatient Medications on File Prior to Encounter   Medication Sig    chlordiazepoxide (LIBRIUM) 10 MG capsule Take 1 capsule (10 mg total) by mouth 4 (four) times daily. for 10 days    folic acid (FOLVITE) 1 MG tablet Take 1 mg by mouth once daily.    furosemide (LASIX) 20 MG tablet Take 20 mg by mouth every Tues, Thurs, Sat.    lactulose (CHRONULAC) 20 gram/30 mL Soln Take 10 g by mouth 3 (three) times daily.    MEN'S MULTI-VITAMIN ORAL Take 1 tablet by mouth once daily.    metFORMIN (GLUCOPHAGE) 500 MG tablet Take 2 tablets (1,000 mg total) by mouth 2 (two) times daily with meals.    pantoprazole (PROTONIX) 40 MG tablet Take 40 mg by mouth once daily.    spironolactone (ALDACTONE) 25 MG tablet Take 25 mg by mouth every Tues, Thurs, Sat.    thiamine (VITAMIN B-1) 100 MG tablet Take 100 mg by mouth once daily.    traZODone (DESYREL) 50 MG tablet Take 50 mg by mouth every evening.     Family History     Problem Relation (Age of Onset)    Diabetes Mother    No Known Problems Father        Tobacco Use    Smoking status: Current Every Day Smoker     Packs/day: 0.50     Types: Vaping w/o nicotine    Smokeless tobacco: Never Used   Substance and Sexual Activity    Alcohol use: Yes     Alcohol/week: 16.8 oz     Types: 28 Cans of beer per week    Drug use: Yes    Sexual activity: Not on file     Review of Systems   Constitutional: Negative for chills, diaphoresis, fatigue and fever.   HENT: Negative for congestion, ear pain, hearing loss and tinnitus.    Eyes: Negative for photophobia, discharge and visual disturbance.   Respiratory: Negative for cough, chest tightness, shortness of breath and wheezing.    Cardiovascular: Negative for chest pain,  palpitations and leg swelling.   Gastrointestinal: Negative for abdominal pain, blood in stool, constipation, diarrhea, nausea and vomiting.   Endocrine: Negative for polydipsia, polyphagia and polyuria.   Genitourinary: Negative for decreased urine volume, difficulty urinating, flank pain, hematuria and urgency.   Musculoskeletal: Negative for back pain, gait problem and myalgias.   Skin: Negative for rash and wound.   Neurological: Positive for weakness. Negative for dizziness, tremors, syncope, light-headedness and headaches.   Psychiatric/Behavioral: Positive for decreased concentration. Negative for agitation and sleep disturbance.     Objective:     Vital Signs (Most Recent):  Temp: 97.7 °F (36.5 °C) (09/05/19 1150)  Pulse: (!) 54 (09/05/19 1430)  Resp: 16 (09/05/19 1430)  BP: 126/75 (09/05/19 1430)  SpO2: 99 % (09/05/19 1430) Vital Signs (24h Range):  Temp:  [97.7 °F (36.5 °C)] 97.7 °F (36.5 °C)  Pulse:  [54-79] 54  Resp:  [13-18] 16  SpO2:  [97 %-100 %] 99 %  BP: ()/(55-81) 126/75     Weight: 77.1 kg (170 lb)  Body mass index is 22.43 kg/m².    Physical Exam   Constitutional: He is oriented to person, place, and time. He appears well-developed and well-nourished. No distress.   HENT:   Head: Normocephalic and atraumatic.   Mouth/Throat: Oropharynx is clear and moist. No oropharyngeal exudate.   Eyes: Pupils are equal, round, and reactive to light. EOM are normal. No scleral icterus.   Neck: No thyromegaly present.   Cardiovascular: Normal rate, regular rhythm and normal heart sounds.   No murmur heard.  Pulmonary/Chest: Effort normal and breath sounds normal. No respiratory distress. He has no wheezes. He has no rales.   Abdominal: Soft. Bowel sounds are normal. He exhibits no distension. There is no tenderness. No hernia.   Musculoskeletal: Normal range of motion. He exhibits no edema.   Lymphadenopathy:     He has no cervical adenopathy.   Neurological: He is alert and oriented to person, place, and  time. He displays normal reflexes. No cranial nerve deficit.   Skin: Skin is warm. Capillary refill takes less than 2 seconds. No rash noted.   Psychiatric: He has a normal mood and affect.   Nursing note and vitals reviewed.        CRANIAL NERVES     CN III, IV, VI   Pupils are equal, round, and reactive to light.  Extraocular motions are normal.        Significant Labs:   BMP:   Recent Labs   Lab 09/05/19  1200   *   *   K 4.5      CO2 21*   BUN 6   CREATININE 0.9   CALCIUM 8.8     CBC:   Recent Labs   Lab 09/05/19  1200   WBC 4.78   HGB 12.0*   HCT 36.9*   *     CMP:   Recent Labs   Lab 09/05/19  1200   *   K 4.5      CO2 21*   *   BUN 6   CREATININE 0.9   CALCIUM 8.8   PROT 7.2   ALBUMIN 2.8*   BILITOT 3.6*   ALKPHOS 154*   AST 53*   ALT 27   ANIONGAP 9   EGFRNONAA >60.0     Lactic Acid: No results for input(s): LACTATE in the last 48 hours.  Magnesium: No results for input(s): MG in the last 48 hours.  Troponin:   Recent Labs   Lab 09/05/19  1200   TROPONINI <0.030       Significant Imaging: I have reviewed all pertinent imaging results/findings within the past 24 hours.     CT HEAD w/o CONTRAST  1. 12 mm ill-defined hypoattenuating focus centrally within the farnaz, nonspecific.  While similar in appearance to August 27, 2019, this is not identified on prior studies from December 2017 and January 2018.  Potential differential possibilities are noted above.  Follow-up MR should be considered for further assessment..  2. No other significant findings.    EKG  Normal sinus rhythm  Nonspecific ST abnormality

## 2019-09-05 NOTE — HPI
"38 year old AAM with PMH of IDDM, alcohol misuse disorder, liver cirrhosis who presented with altered mental status. His fiance called the ambulance because he kept "passing out". He takes percocet, librium, trazodone, ultram. He admits to drinking a 32 oz of "twisted tea" (malt liqour) yesterday in the afternoon after taking his medications in the morning. He cannot specifically say what he is on all the medications for, but says he has been taking them for 2 weeks.   "

## 2019-09-06 VITALS
RESPIRATION RATE: 18 BRPM | HEIGHT: 73 IN | WEIGHT: 170 LBS | SYSTOLIC BLOOD PRESSURE: 111 MMHG | BODY MASS INDEX: 22.53 KG/M2 | TEMPERATURE: 98 F | HEART RATE: 80 BPM | DIASTOLIC BLOOD PRESSURE: 73 MMHG | OXYGEN SATURATION: 99 %

## 2019-09-06 PROBLEM — R73.9 HYPERGLYCEMIA: Status: RESOLVED | Noted: 2019-09-05 | Resolved: 2019-09-06

## 2019-09-06 PROBLEM — G93.40 ACUTE ENCEPHALOPATHY: Status: RESOLVED | Noted: 2019-09-05 | Resolved: 2019-09-06

## 2019-09-06 LAB
AMMONIA PLAS-SCNC: 59 UMOL/L (ref 10–50)
ERYTHROCYTE [DISTWIDTH] IN BLOOD BY AUTOMATED COUNT: 14.7 % (ref 11.5–14.5)
GLUCOSE SERPL-MCNC: 137 MG/DL (ref 70–110)
GLUCOSE SERPL-MCNC: 218 MG/DL (ref 70–110)
HCT VFR BLD AUTO: 39.2 % (ref 40–54)
HGB BLD-MCNC: 13 G/DL (ref 14–18)
MCH RBC QN AUTO: 32.3 PG (ref 27–31)
MCHC RBC AUTO-ENTMCNC: 33.2 G/DL (ref 32–36)
MCV RBC AUTO: 98 FL (ref 82–98)
PLATELET # BLD AUTO: 134 K/UL (ref 150–350)
PMV BLD AUTO: 10.8 FL (ref 9.2–12.9)
RBC # BLD AUTO: 4.02 M/UL (ref 4.6–6.2)
WBC # BLD AUTO: 4.7 K/UL (ref 3.9–12.7)

## 2019-09-06 PROCEDURE — 82140 ASSAY OF AMMONIA: CPT

## 2019-09-06 PROCEDURE — G0378 HOSPITAL OBSERVATION PER HR: HCPCS

## 2019-09-06 PROCEDURE — 85027 COMPLETE CBC AUTOMATED: CPT

## 2019-09-06 PROCEDURE — 36415 COLL VENOUS BLD VENIPUNCTURE: CPT

## 2019-09-06 NOTE — HOSPITAL COURSE
"38 year old AAM with PMH of IDDM, alcohol misuse disorder, liver cirrhosis who presented with altered mental status. His fiance called the ambulance because he kept "passing out". He takes percocet, librium, trazodone, ultram. He admits to drinking a 32 oz of "twisted tea" (malt liqour) yesterday in the afternoon after taking his medications in the morning. He cannot specifically say what he is on all the medications for, but says he has been taking them for 2 weeks  09/06 Patient admitted to regular floor. Had MRI which did not reveal any acute pathology. Gliosis and hyperglycemia was dicussed with the patient. He is alert and fully communicative. He'd like to be discharged home. He feels "Very good".  "

## 2019-09-06 NOTE — H&P
"[facility]  History & Physical    Patient Name: Flako Quintana  MRN: [id number]  Admission Date: 9/5/2019  Attending Physician: Katlyn Ward MD  Primary Care Provider: Leopoldo Lim MD         Patient information was obtained from patient and ER records.     Subjective:     Principal Problem:Acute encephalopathy    Chief Complaint:   Chief Complaint   Patient presents with    Loss of Consciousness     patient report dizziness with syncopal episodes 5-6 days. Pt was seen at [facility] for same symptoms. EMS reports SBP 89 upon arrival. pt slurring words- patient taking any medications for "broken right knee"        HPI: 38 year old AAM with PMH of IDDM, alcohol misuse disorder, liver cirrhosis who presented with altered mental status. His fiance called the ambulance because he kept "passing out". He takes percocet, librium, trazodone, ultram. He admits to drinking a 32 oz of "twisted tea" (malt liqour) yesterday in the afternoon after taking his medications in the morning. He cannot specifically say what he is on all the medications for, but says he has been taking them for 2 weeks.     Past Medical History:   Diagnosis Date    Diabetes mellitus        No past surgical history on file.    Review of patient's allergies indicates:  No Known Allergies    No current facility-administered medications on file prior to encounter.      Current Outpatient Medications on File Prior to Encounter   Medication Sig    chlordiazepoxide (LIBRIUM) 10 MG capsule Take 1 capsule (10 mg total) by mouth 4 (four) times daily. for 10 days    folic acid (FOLVITE) 1 MG tablet Take 1 mg by mouth once daily.    furosemide (LASIX) 20 MG tablet Take 20 mg by mouth every Tues, Thurs, Sat.    lactulose (CHRONULAC) 20 gram/30 mL Soln Take 10 g by mouth 3 (three) times daily.    MEN'S MULTI-VITAMIN ORAL Take 1 tablet by mouth once daily.    metFORMIN (GLUCOPHAGE) 500 MG tablet Take 2 tablets (1,000 " mg total) by mouth 2 (two) times daily with meals.    pantoprazole (PROTONIX) 40 MG tablet Take 40 mg by mouth once daily.    spironolactone (ALDACTONE) 25 MG tablet Take 25 mg by mouth every Tues, Thurs, Sat.    thiamine (VITAMIN B-1) 100 MG tablet Take 100 mg by mouth once daily.    traZODone (DESYREL) 50 MG tablet Take 50 mg by mouth every evening.     Family History     Problem Relation (Age of Onset)    Diabetes Mother    No Known Problems Father        Tobacco Use    Smoking status: Current Every Day Smoker     Packs/day: 0.50     Types: Vaping w/o nicotine    Smokeless tobacco: Never Used   Substance and Sexual Activity    Alcohol use: Yes     Alcohol/week: 16.8 oz     Types: 28 Cans of beer per week    Drug use: Yes    Sexual activity: Not on file     Review of Systems   Constitutional: Negative for chills, diaphoresis, fatigue and fever.   HENT: Negative for congestion, ear pain, hearing loss and tinnitus.    Eyes: Negative for photophobia, discharge and visual disturbance.   Respiratory: Negative for cough, chest tightness, shortness of breath and wheezing.    Cardiovascular: Negative for chest pain, palpitations and leg swelling.   Gastrointestinal: Negative for abdominal pain, blood in stool, constipation, diarrhea, nausea and vomiting.   Endocrine: Negative for polydipsia, polyphagia and polyuria.   Genitourinary: Negative for decreased urine volume, difficulty urinating, flank pain, hematuria and urgency.   Musculoskeletal: Negative for back pain, gait problem and myalgias.   Skin: Negative for rash and wound.   Neurological: Positive for weakness. Negative for dizziness, tremors, syncope, light-headedness and headaches.   Psychiatric/Behavioral: Positive for decreased concentration. Negative for agitation and sleep disturbance.     Objective:     Vital Signs (Most Recent):  Temp: 97.7 °F (36.5 °C) (09/05/19 1150)  Pulse: (!) 54 (09/05/19 1430)  Resp: 16 (09/05/19 1430)  BP: 126/75 (09/05/19  1430)  SpO2: 99 % (09/05/19 1430) Vital Signs (24h Range):  Temp:  [97.7 °F (36.5 °C)] 97.7 °F (36.5 °C)  Pulse:  [54-79] 54  Resp:  [13-18] 16  SpO2:  [97 %-100 %] 99 %  BP: ()/(55-81) 126/75     Weight: 77.1 kg (170 lb)  Body mass index is 22.43 kg/m².    Physical Exam   Constitutional: He is oriented to person, place, and time. He appears well-developed and well-nourished. No distress.   HENT:   Head: Normocephalic and atraumatic.   Mouth/Throat: Oropharynx is clear and moist. No oropharyngeal exudate.   Eyes: Pupils are equal, round, and reactive to light. EOM are normal. No scleral icterus.   Neck: No thyromegaly present.   Cardiovascular: Normal rate, regular rhythm and normal heart sounds.   No murmur heard.  Pulmonary/Chest: Effort normal and breath sounds normal. No respiratory distress. He has no wheezes. He has no rales.   Abdominal: Soft. Bowel sounds are normal. He exhibits no distension. There is no tenderness. No hernia.   Musculoskeletal: Normal range of motion. He exhibits no edema.   Lymphadenopathy:     He has no cervical adenopathy.   Neurological: He is alert and oriented to person, place, and time. He displays normal reflexes. No cranial nerve deficit.   Skin: Skin is warm. Capillary refill takes less than 2 seconds. No rash noted.   Psychiatric: He has a normal mood and affect.   Nursing note and vitals reviewed.        CRANIAL NERVES     CN III, IV, VI   Pupils are equal, round, and reactive to light.  Extraocular motions are normal.        Significant Labs:   BMP:   Recent Labs   Lab 09/05/19  1200   *   *   K 4.5      CO2 21*   BUN 6   CREATININE 0.9   CALCIUM 8.8     CBC:   Recent Labs   Lab 09/05/19  1200   WBC 4.78   HGB 12.0*   HCT 36.9*   *     CMP:   Recent Labs   Lab 09/05/19  1200   *   K 4.5      CO2 21*   *   BUN 6   CREATININE 0.9   CALCIUM 8.8   PROT 7.2   ALBUMIN 2.8*   BILITOT 3.6*   ALKPHOS 154*   AST 53*   ALT 27   ANIONGAP  9   EGFRNONAA >60.0     Lactic Acid: No results for input(s): LACTATE in the last 48 hours.  Magnesium: No results for input(s): MG in the last 48 hours.  Troponin:   Recent Labs   Lab 09/05/19  1200   TROPONINI <0.030       Significant Imaging: I have reviewed all pertinent imaging results/findings within the past 24 hours.     CT HEAD w/o CONTRAST  1. 12 mm ill-defined hypoattenuating focus centrally within the farnaz, nonspecific.  While similar in appearance to August 27, 2019, this is not identified on prior studies from December 2017 and January 2018.  Potential differential possibilities are noted above.  Follow-up MR should be considered for further assessment..  2. No other significant findings.    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

## 2019-09-06 NOTE — ASSESSMENT & PLAN NOTE
Most likely related to polypharmacy- however patient has a history of hepatic encepahlopathy  Hold all sedative medications  IV fluid hydration  Check ammonia level  Neurology consulted - MRI brain w/o contrast  Reassess mental status

## 2019-09-06 NOTE — CARE UPDATE
09/05/19 2104   Patient Assessment/Suction   Level of Consciousness (AVPU) alert   Respiratory Effort Normal;Unlabored   Expansion/Accessory Muscles/Retractions expansion symmetric;no use of accessory muscles   All Lung Fields Breath Sounds clear   PRE-TX-O2   O2 Device (Oxygen Therapy) room air   SpO2 96 %   Pulse Oximetry Type Continuous   $ Pulse Oximetry - Multiple Charge Pulse Oximetry - Multiple   Pulse 70   Resp 14   Cont. POX Int. Instructed and encouraged deep diaphragmatic breathing exercises.

## 2019-09-06 NOTE — NURSING
Patient resting comfortably with no complaints, patient has been AAOX4 since he was admitted to the floor this evening.

## 2019-09-06 NOTE — NURSING
Patient d/c home with avs discharge instruction, reviewed with patient, veberlized understanding. Pt refused EEG. Pt called a cab to transfer him home left floor ambulating, refused assist with transfer out of facility. Pt alert and orient timex 4.

## 2019-09-07 LAB
BACTERIA UR CULT: ABNORMAL
BARBITURATES UR QL SCN: NEGATIVE NG/ML
LABORATORY COMMENT REPORT: NORMAL

## 2019-12-05 ENCOUNTER — HOSPITAL ENCOUNTER (INPATIENT)
Facility: HOSPITAL | Age: 39
LOS: 4 days | Discharge: HOME | DRG: 623 | End: 2019-12-09
Attending: EMERGENCY MEDICINE | Admitting: STUDENT IN AN ORGANIZED HEALTH CARE EDUCATION/TRAINING PROGRAM
Payer: MEDICAID

## 2019-12-05 DIAGNOSIS — L97.509 DIABETIC FOOT ULCER: Primary | ICD-10-CM

## 2019-12-05 DIAGNOSIS — I73.9 PAD (PERIPHERAL ARTERY DISEASE): ICD-10-CM

## 2019-12-05 DIAGNOSIS — E11.621 DIABETIC ULCER OF RIGHT MIDFOOT ASSOCIATED WITH TYPE 2 DIABETES MELLITUS, WITH MUSCLE INVOLVEMENT WITHOUT EVIDENCE OF NECROSIS: ICD-10-CM

## 2019-12-05 DIAGNOSIS — I96 DRY GANGRENE: ICD-10-CM

## 2019-12-05 DIAGNOSIS — E11.621 DIABETIC FOOT ULCER: Primary | ICD-10-CM

## 2019-12-05 DIAGNOSIS — L97.415 DIABETIC ULCER OF RIGHT MIDFOOT ASSOCIATED WITH TYPE 2 DIABETES MELLITUS, WITH MUSCLE INVOLVEMENT WITHOUT EVIDENCE OF NECROSIS: ICD-10-CM

## 2019-12-05 PROCEDURE — 99285 EMERGENCY DEPT VISIT HI MDM: CPT | Mod: 25

## 2019-12-05 PROCEDURE — 96375 TX/PRO/DX INJ NEW DRUG ADDON: CPT

## 2019-12-05 PROCEDURE — 96365 THER/PROPH/DIAG IV INF INIT: CPT

## 2019-12-05 PROCEDURE — 12000002 HC ACUTE/MED SURGE SEMI-PRIVATE ROOM

## 2019-12-06 ENCOUNTER — CLINICAL SUPPORT (OUTPATIENT)
Dept: CARDIOLOGY | Facility: HOSPITAL | Age: 39
DRG: 623 | End: 2019-12-06
Attending: FAMILY MEDICINE
Payer: MEDICAID

## 2019-12-06 PROBLEM — E11.621 DIABETIC FOOT ULCER: Status: ACTIVE | Noted: 2019-12-06

## 2019-12-06 PROBLEM — L97.509 DIABETIC FOOT ULCER: Status: ACTIVE | Noted: 2019-12-06

## 2019-12-06 LAB
ALBUMIN SERPL BCP-MCNC: 3.3 G/DL (ref 3.5–5.2)
ALP SERPL-CCNC: 239 U/L (ref 55–135)
ALT SERPL W/O P-5'-P-CCNC: 46 U/L (ref 10–44)
ANION GAP SERPL CALC-SCNC: 10 MMOL/L (ref 8–16)
ANION GAP SERPL CALC-SCNC: 10 MMOL/L (ref 8–16)
AST SERPL-CCNC: 92 U/L (ref 10–40)
BASOPHILS # BLD AUTO: 0.07 K/UL (ref 0–0.2)
BASOPHILS # BLD AUTO: 0.08 K/UL (ref 0–0.2)
BASOPHILS NFR BLD: 1.9 % (ref 0–1.9)
BASOPHILS NFR BLD: 2.1 % (ref 0–1.9)
BILIRUB SERPL-MCNC: 3.3 MG/DL (ref 0.1–1)
BUN SERPL-MCNC: <5 MG/DL (ref 6–20)
BUN SERPL-MCNC: <5 MG/DL (ref 6–20)
CALCIUM SERPL-MCNC: 8.2 MG/DL (ref 8.7–10.5)
CALCIUM SERPL-MCNC: 8.4 MG/DL (ref 8.7–10.5)
CHLORIDE SERPL-SCNC: 103 MMOL/L (ref 95–110)
CHLORIDE SERPL-SCNC: 105 MMOL/L (ref 95–110)
CO2 SERPL-SCNC: 23 MMOL/L (ref 23–29)
CO2 SERPL-SCNC: 23 MMOL/L (ref 23–29)
CREAT SERPL-MCNC: 0.6 MG/DL (ref 0.5–1.4)
CREAT SERPL-MCNC: 0.7 MG/DL (ref 0.5–1.4)
CRP SERPL-MCNC: 0.1 MG/DL (ref 0–0.75)
DIFFERENTIAL METHOD: ABNORMAL
DIFFERENTIAL METHOD: ABNORMAL
EOSINOPHIL # BLD AUTO: 0.1 K/UL (ref 0–0.5)
EOSINOPHIL # BLD AUTO: 0.1 K/UL (ref 0–0.5)
EOSINOPHIL NFR BLD: 1.7 % (ref 0–8)
EOSINOPHIL NFR BLD: 2.9 % (ref 0–8)
ERYTHROCYTE [DISTWIDTH] IN BLOOD BY AUTOMATED COUNT: 13.6 % (ref 11.5–14.5)
ERYTHROCYTE [DISTWIDTH] IN BLOOD BY AUTOMATED COUNT: 13.7 % (ref 11.5–14.5)
ERYTHROCYTE [SEDIMENTATION RATE] IN BLOOD BY WESTERGREN METHOD: 21 MM/HR (ref 0–10)
EST. GFR  (AFRICAN AMERICAN): >60 ML/MIN/1.73 M^2
EST. GFR  (AFRICAN AMERICAN): >60 ML/MIN/1.73 M^2
EST. GFR  (NON AFRICAN AMERICAN): >60 ML/MIN/1.73 M^2
EST. GFR  (NON AFRICAN AMERICAN): >60 ML/MIN/1.73 M^2
GLUCOSE SERPL-MCNC: 208 MG/DL (ref 70–110)
GLUCOSE SERPL-MCNC: 219 MG/DL (ref 70–110)
GLUCOSE SERPL-MCNC: 239 MG/DL (ref 70–110)
GLUCOSE SERPL-MCNC: 274 MG/DL (ref 70–110)
HCT VFR BLD AUTO: 38.2 % (ref 40–54)
HCT VFR BLD AUTO: 39.1 % (ref 40–54)
HGB BLD-MCNC: 12.9 G/DL (ref 14–18)
HGB BLD-MCNC: 13.4 G/DL (ref 14–18)
IMM GRANULOCYTES # BLD AUTO: 0.01 K/UL (ref 0–0.04)
IMM GRANULOCYTES # BLD AUTO: 0.01 K/UL (ref 0–0.04)
IMM GRANULOCYTES NFR BLD AUTO: 0.2 % (ref 0–0.5)
IMM GRANULOCYTES NFR BLD AUTO: 0.3 % (ref 0–0.5)
LYMPHOCYTES # BLD AUTO: 1.6 K/UL (ref 1–4.8)
LYMPHOCYTES # BLD AUTO: 1.9 K/UL (ref 1–4.8)
LYMPHOCYTES NFR BLD: 46.3 % (ref 18–48)
LYMPHOCYTES NFR BLD: 47.2 % (ref 18–48)
MAGNESIUM SERPL-MCNC: 1.5 MG/DL (ref 1.6–2.6)
MCH RBC QN AUTO: 32.3 PG (ref 27–31)
MCH RBC QN AUTO: 33.2 PG (ref 27–31)
MCHC RBC AUTO-ENTMCNC: 33.8 G/DL (ref 32–36)
MCHC RBC AUTO-ENTMCNC: 34.3 G/DL (ref 32–36)
MCV RBC AUTO: 96 FL (ref 82–98)
MCV RBC AUTO: 97 FL (ref 82–98)
MONOCYTES # BLD AUTO: 0.5 K/UL (ref 0.3–1)
MONOCYTES # BLD AUTO: 0.6 K/UL (ref 0.3–1)
MONOCYTES NFR BLD: 13.3 % (ref 4–15)
MONOCYTES NFR BLD: 14.6 % (ref 4–15)
NEUTROPHILS # BLD AUTO: 1.2 K/UL (ref 1.8–7.7)
NEUTROPHILS # BLD AUTO: 1.4 K/UL (ref 1.8–7.7)
NEUTROPHILS NFR BLD: 34.4 % (ref 38–73)
NEUTROPHILS NFR BLD: 35.1 % (ref 38–73)
NRBC BLD-RTO: 0 /100 WBC
NRBC BLD-RTO: 0 /100 WBC
PLATELET # BLD AUTO: 56 K/UL (ref 150–350)
PLATELET # BLD AUTO: 59 K/UL (ref 150–350)
PMV BLD AUTO: 12.1 FL (ref 9.2–12.9)
PMV BLD AUTO: 13.1 FL (ref 9.2–12.9)
POTASSIUM SERPL-SCNC: 3.5 MMOL/L (ref 3.5–5.1)
POTASSIUM SERPL-SCNC: 3.7 MMOL/L (ref 3.5–5.1)
PREALB SERPL-MCNC: 6 MG/DL (ref 20–43)
PROT SERPL-MCNC: 7.9 G/DL (ref 6–8.4)
RBC # BLD AUTO: 4 M/UL (ref 4.6–6.2)
RBC # BLD AUTO: 4.04 M/UL (ref 4.6–6.2)
SODIUM SERPL-SCNC: 136 MMOL/L (ref 136–145)
SODIUM SERPL-SCNC: 138 MMOL/L (ref 136–145)
WBC # BLD AUTO: 3.39 K/UL (ref 3.9–12.7)
WBC # BLD AUTO: 4.11 K/UL (ref 3.9–12.7)

## 2019-12-06 PROCEDURE — 94761 N-INVAS EAR/PLS OXIMETRY MLT: CPT

## 2019-12-06 PROCEDURE — 80053 COMPREHEN METABOLIC PANEL: CPT

## 2019-12-06 PROCEDURE — C9399 UNCLASSIFIED DRUGS OR BIOLOG: HCPCS | Performed by: NURSE PRACTITIONER

## 2019-12-06 PROCEDURE — 86140 C-REACTIVE PROTEIN: CPT

## 2019-12-06 PROCEDURE — 99232 SBSQ HOSP IP/OBS MODERATE 35: CPT | Mod: ,,, | Performed by: PODIATRIST

## 2019-12-06 PROCEDURE — 63600175 PHARM REV CODE 636 W HCPCS: Performed by: FAMILY MEDICINE

## 2019-12-06 PROCEDURE — 84134 ASSAY OF PREALBUMIN: CPT

## 2019-12-06 PROCEDURE — 85651 RBC SED RATE NONAUTOMATED: CPT

## 2019-12-06 PROCEDURE — 99232 PR SUBSEQUENT HOSPITAL CARE,LEVL II: ICD-10-PCS | Mod: ,,, | Performed by: PODIATRIST

## 2019-12-06 PROCEDURE — 63600175 PHARM REV CODE 636 W HCPCS: Performed by: EMERGENCY MEDICINE

## 2019-12-06 PROCEDURE — 87040 BLOOD CULTURE FOR BACTERIA: CPT | Mod: 59

## 2019-12-06 PROCEDURE — 85025 COMPLETE CBC W/AUTO DIFF WBC: CPT

## 2019-12-06 PROCEDURE — 83735 ASSAY OF MAGNESIUM: CPT

## 2019-12-06 PROCEDURE — 36415 COLL VENOUS BLD VENIPUNCTURE: CPT

## 2019-12-06 PROCEDURE — 80048 BASIC METABOLIC PNL TOTAL CA: CPT

## 2019-12-06 PROCEDURE — 25500020 PHARM REV CODE 255: Performed by: FAMILY MEDICINE

## 2019-12-06 PROCEDURE — 12000002 HC ACUTE/MED SURGE SEMI-PRIVATE ROOM

## 2019-12-06 PROCEDURE — 25000003 PHARM REV CODE 250: Performed by: FAMILY MEDICINE

## 2019-12-06 PROCEDURE — 93923 UPR/LXTR ART STDY 3+ LVLS: CPT | Mod: 50

## 2019-12-06 PROCEDURE — 63600175 PHARM REV CODE 636 W HCPCS: Performed by: NURSE PRACTITIONER

## 2019-12-06 PROCEDURE — A9585 GADOBUTROL INJECTION: HCPCS | Performed by: FAMILY MEDICINE

## 2019-12-06 PROCEDURE — 25000003 PHARM REV CODE 250: Performed by: NURSE PRACTITIONER

## 2019-12-06 RX ORDER — BISACODYL 10 MG
10 SUPPOSITORY, RECTAL RECTAL DAILY PRN
Status: DISCONTINUED | OUTPATIENT
Start: 2019-12-06 | End: 2019-12-09 | Stop reason: HOSPADM

## 2019-12-06 RX ORDER — FOLIC ACID 1 MG/1
1 TABLET ORAL DAILY
Status: DISCONTINUED | OUTPATIENT
Start: 2019-12-06 | End: 2019-12-09 | Stop reason: HOSPADM

## 2019-12-06 RX ORDER — GLUCAGON 1 MG
1 KIT INJECTION
Status: DISCONTINUED | OUTPATIENT
Start: 2019-12-06 | End: 2019-12-09 | Stop reason: HOSPADM

## 2019-12-06 RX ORDER — SODIUM CHLORIDE 0.9 % (FLUSH) 0.9 %
10 SYRINGE (ML) INJECTION
Status: DISCONTINUED | OUTPATIENT
Start: 2019-12-06 | End: 2019-12-09 | Stop reason: HOSPADM

## 2019-12-06 RX ORDER — TRAMADOL HYDROCHLORIDE 50 MG/1
50 TABLET ORAL EVERY 12 HOURS PRN
Status: DISCONTINUED | OUTPATIENT
Start: 2019-12-06 | End: 2019-12-09 | Stop reason: HOSPADM

## 2019-12-06 RX ORDER — GABAPENTIN 300 MG/1
600 CAPSULE ORAL NIGHTLY
Status: DISCONTINUED | OUTPATIENT
Start: 2019-12-06 | End: 2019-12-09 | Stop reason: HOSPADM

## 2019-12-06 RX ORDER — LANOLIN ALCOHOL/MO/W.PET/CERES
800 CREAM (GRAM) TOPICAL
Status: DISCONTINUED | OUTPATIENT
Start: 2019-12-06 | End: 2019-12-09 | Stop reason: HOSPADM

## 2019-12-06 RX ORDER — INSULIN ASPART 100 [IU]/ML
0-5 INJECTION, SOLUTION INTRAVENOUS; SUBCUTANEOUS
Status: DISCONTINUED | OUTPATIENT
Start: 2019-12-06 | End: 2019-12-09 | Stop reason: HOSPADM

## 2019-12-06 RX ORDER — THIAMINE HCL 100 MG
100 TABLET ORAL DAILY
Status: DISCONTINUED | OUTPATIENT
Start: 2019-12-06 | End: 2019-12-09 | Stop reason: HOSPADM

## 2019-12-06 RX ORDER — POTASSIUM CHLORIDE 20 MEQ/15ML
40 SOLUTION ORAL
Status: DISCONTINUED | OUTPATIENT
Start: 2019-12-06 | End: 2019-12-09 | Stop reason: HOSPADM

## 2019-12-06 RX ORDER — GADOBUTROL 604.72 MG/ML
7 INJECTION INTRAVENOUS
Status: COMPLETED | OUTPATIENT
Start: 2019-12-06 | End: 2019-12-06

## 2019-12-06 RX ORDER — IBUPROFEN 200 MG
24 TABLET ORAL
Status: DISCONTINUED | OUTPATIENT
Start: 2019-12-06 | End: 2019-12-09 | Stop reason: HOSPADM

## 2019-12-06 RX ORDER — ONDANSETRON 2 MG/ML
4 INJECTION INTRAMUSCULAR; INTRAVENOUS EVERY 8 HOURS PRN
Status: DISCONTINUED | OUTPATIENT
Start: 2019-12-06 | End: 2019-12-09 | Stop reason: HOSPADM

## 2019-12-06 RX ORDER — SODIUM,POTASSIUM PHOSPHATES 280-250MG
2 POWDER IN PACKET (EA) ORAL
Status: DISCONTINUED | OUTPATIENT
Start: 2019-12-06 | End: 2019-12-09 | Stop reason: HOSPADM

## 2019-12-06 RX ORDER — ACETAMINOPHEN 325 MG/1
650 TABLET ORAL EVERY 4 HOURS PRN
Status: DISCONTINUED | OUTPATIENT
Start: 2019-12-06 | End: 2019-12-09 | Stop reason: HOSPADM

## 2019-12-06 RX ORDER — MAGNESIUM SULFATE HEPTAHYDRATE 40 MG/ML
2 INJECTION, SOLUTION INTRAVENOUS ONCE
Status: COMPLETED | OUTPATIENT
Start: 2019-12-06 | End: 2019-12-06

## 2019-12-06 RX ORDER — CYCLOBENZAPRINE HCL 10 MG
10 TABLET ORAL 3 TIMES DAILY PRN
Status: DISCONTINUED | OUTPATIENT
Start: 2019-12-06 | End: 2019-12-09 | Stop reason: HOSPADM

## 2019-12-06 RX ORDER — ACETAMINOPHEN 325 MG/1
650 TABLET ORAL EVERY 8 HOURS PRN
Status: DISCONTINUED | OUTPATIENT
Start: 2019-12-06 | End: 2019-12-09 | Stop reason: HOSPADM

## 2019-12-06 RX ORDER — IBUPROFEN 200 MG
16 TABLET ORAL
Status: DISCONTINUED | OUTPATIENT
Start: 2019-12-06 | End: 2019-12-09 | Stop reason: HOSPADM

## 2019-12-06 RX ADMIN — FOLIC ACID 1 MG: 1 TABLET ORAL at 09:12

## 2019-12-06 RX ADMIN — PIPERACILLIN AND TAZOBACTAM 4.5 G: 4; .5 INJECTION, POWDER, LYOPHILIZED, FOR SOLUTION INTRAVENOUS; PARENTERAL at 02:12

## 2019-12-06 RX ADMIN — THIAMINE HCL TAB 100 MG 100 MG: 100 TAB at 09:12

## 2019-12-06 RX ADMIN — PIPERACILLIN AND TAZOBACTAM 4.5 G: 4; .5 INJECTION, POWDER, LYOPHILIZED, FOR SOLUTION INTRAVENOUS; PARENTERAL at 09:12

## 2019-12-06 RX ADMIN — INSULIN ASPART 1 UNITS: 100 INJECTION, SOLUTION INTRAVENOUS; SUBCUTANEOUS at 09:12

## 2019-12-06 RX ADMIN — MAGNESIUM SULFATE 2 G: 2 INJECTION INTRAVENOUS at 09:12

## 2019-12-06 RX ADMIN — GADOBUTROL 7 ML: 604.72 INJECTION INTRAVENOUS at 09:12

## 2019-12-06 RX ADMIN — GABAPENTIN 600 MG: 300 CAPSULE ORAL at 09:12

## 2019-12-06 RX ADMIN — INSULIN DETEMIR 20 UNITS: 100 INJECTION, SOLUTION SUBCUTANEOUS at 09:12

## 2019-12-06 RX ADMIN — VANCOMYCIN HYDROCHLORIDE 1500 MG: 1 INJECTION, POWDER, LYOPHILIZED, FOR SOLUTION INTRAVENOUS at 02:12

## 2019-12-06 RX ADMIN — THERA TABS 1 TABLET: TAB at 09:12

## 2019-12-06 NOTE — PROGRESS NOTES
VANCOMYCIN PHARMACOKINETIC NOTE:  Vancomycin Day # 1    Objective/Assessment:    Diagnosis/Indication for Vancomycin: skin and soft tissue/ gangrene    38 y.o., male; Actual Body Weight = 78.5 kg (173 lb).    The patient has the following labs:     12/6/2019 Estimated Creatinine Clearance: 160.4 mL/min (based on SCr of 0.7 mg/dL). Lab Results   Component Value Date    BUN <5 (L) 12/06/2019       Lab Results   Component Value Date    WBC 4.11 12/06/2019              Plan:  Adjust vancomycin dose and/or frequency based on the patient's actual weight and renal function:  Initiate Vancomycin 1250 mg IV every 12 hours following a loading dose of 1500 mg.  Orders have been entered into patient's chart.    Vancomycin dose = 15.9 mg/kg actual body weight    Vancomycin trough level has been ordered for 12/7 @14:00, prior to 4th dose.    Pharmacy will manage vancomycin therapy, monitor serum vancomycin levels, monitor renal function and adjust regimen as necessary.      Thank you for allowing us to participate in this patient's care.     Connor Hdez 12/6/2019 5:09 AM  Department of Pharmacy  Ext [phone]

## 2019-12-06 NOTE — PROGRESS NOTES
12/06/19 1555 12/06/19 1620        Wound 12/06/19 0440 Diabetic Ulcer Plantar #1   Date First Assessed/Time First Assessed: 12/06/19 0440   Primary Wound Type: Diabetic Ulcer  Side: Right  Location: Plantar  Wound/PI Number (optional): #1   Wound Image     --    Dressing Appearance Dried drainage  (dressing was loose)  --    Drainage Amount Scant  --    Drainage Characteristics/Odor Serosanguineous  --    Appearance Red;Dry;Pink  --    Periwound Area Dry  (dry skin to foot)  --    Wound Edges Callused;Open  --    Wound Length (cm) 1.2 cm  --    Wound Width (cm) 1.5 cm  --    Wound Depth (cm) 0.2 cm  --    Wound Volume (cm^3) 0.36 cm^3  --    Wound Surface Area (cm^2) 1.8 cm^2  --    Care Sterile normal saline  --    Dressing Rolled gauze;Gauze, wet to dry;Changed;Other (see comments)  (Santyl)  --         Wound 12/06/19 0447 Diabetic Ulcer Plantar #2   Date First Assessed/Time First Assessed: 12/06/19 0447   Primary Wound Type: Diabetic Ulcer  Side: Right  Location: Plantar  Wound/PI Number (optional): #2   Dressing Appearance Dry  --    Drainage Amount None  --    Appearance Tan;Dry;Closed/resurfaced;Other (see comments)  (callus)  --    Periwound Area Dry  --    Wound Edges Callused;Rolled/closed  --         Wound 12/06/19 1555 Blister(s) medial Toe, first   Date First Assessed/Time First Assessed: 12/06/19 1555   Pre-existing: Yes  Primary Wound Type: Blister(s)  Side: Right  Orientation: medial  Location: Toe, first   Wound Image  --     Dressing Appearance  --  Open to air;Intact;Dry   Drainage Amount  --  None   Appearance  --  Maroon;Blistered;Dry;Closed/resurfaced   Periwound Area  --  Dry   Wound Edges  --  Rolled/closed   Wound Length (cm)  --  2 cm   Wound Width (cm)  --  1 cm   Wound Surface Area (cm^2)  --  2 cm^2   Pt seen for wound care to right foot; pt having an ultrasound done at bedside. Pt denies any wounds to left foot and said nothing needed to be done for his left foot. Per chart, pt does  have a blister-like wound to left toe. Pt did allow for wound care to right foot- rt great toe and right plantar foot. See above and photos. Pt was seen by podiatry and wound care orders are in place. Plan is for surgical debridement on Monday. Pt tolerated wound care.

## 2019-12-06 NOTE — PROGRESS NOTES
[facility]iatry  Progress Note    Patient Name: Flako Quintana  MRN: [id number]  Admission Date: 12/5/2019  Hospital Length of Stay: 0 days  Attending Physician: Анна Bhatt MD  Primary Care Provider: Leopoldo Lim MD   Scheduled Meds:   collagenase   Topical (Top) Daily    folic acid  1 mg Oral Daily    gabapentin  600 mg Oral Nightly    insulin detemir U-100  20 Units Subcutaneous QHS    magnesium sulfate IVPB  2 g Intravenous Once    multivitamin  1 tablet Oral Daily    piperacillin-tazobactam (ZOSYN) IVPB  4.5 g Intravenous Q8H    thiamine  100 mg Oral Daily    vancomycin (VANCOCIN) IVPB  1,250 mg Intravenous Q12H     Continuous Infusions:  PRN Meds:acetaminophen, acetaminophen, bisacodyl, cyclobenzaprine, dextrose 50%, dextrose 50%, glucagon (human recombinant), glucose, glucose, insulin aspart U-100, magnesium oxide, magnesium oxide, ondansetron, potassium chloride 10%, potassium chloride 10%, potassium chloride 10%, potassium, sodium phosphates, potassium, sodium phosphates, potassium, sodium phosphates, promethazine (PHENERGAN) IVPB, sodium chloride 0.9%, traMADol    Review of patient's allergies indicates:  No Known Allergies     Past Medical History:   Diagnosis Date    Diabetes mellitus      History reviewed. No pertinent surgical history.    Family History     Problem Relation (Age of Onset)    Diabetes Mother    No Known Problems Father        Tobacco Use    Smoking status: Current Every Day Smoker     Packs/day: 0.50     Types: Vaping w/o nicotine    Smokeless tobacco: Never Used   Substance and Sexual Activity    Alcohol use: Yes     Alcohol/week: 28.0 standard drinks     Types: 28 Cans of beer per week    Drug use: Yes    Sexual activity: Not on file     Review of Systems  Objective:     Vital Signs (Most Recent):  Temp: 98.3 °F (36.8 °C) (12/06/19 0413)  Pulse: 89 (12/06/19 0413)  Resp: 18 (12/06/19 0413)  BP: 130/83 (12/06/19 0413)  SpO2: 98 % (12/06/19  0413) Vital Signs (24h Range):  Temp:  [97.8 °F (36.6 °C)-98.5 °F (36.9 °C)] 98.3 °F (36.8 °C)  Pulse:  [] 89  Resp:  [16-18] 18  SpO2:  [95 %-98 %] 98 %  BP: (108-133)/(63-83) 130/83     Weight: 78.4 kg (172 lb 14.2 oz)  Body mass index is 22.81 kg/m².    Foot Exam    Right Foot/Ankle     Neurovascular  Dorsalis pedis: 2+  Posterior tibial: 1+  Saphenous nerve sensation: diminished  Tibial nerve sensation: diminished  Superficial peroneal nerve sensation: diminished  Deep peroneal nerve sensation: diminished  Sural nerve sensation: diminished      Left Foot/Ankle      Neurovascular  Dorsalis pedis: 2+  Posterior tibial: 1+  Saphenous nerve sensation: diminished  Tibial nerve sensation: diminished  Superficial peroneal nerve sensation: diminished  Deep peroneal nerve sensation: diminished  Sural nerve sensation: diminished          Wound:   Sub 1st metatarsal head.  No current drainage. Surrounding hyperkeratotic skin. Probes to subcutaneous tissue. Moderate surrounding erythema and edema noted.    Gangrene changes with what appears to be underlying blood blisters on tips of right 1st digit and left 5th digit.    Blood blisters on left 3rd and 4th tips of digits.     Pre-ulcerative calluses located sub 3rd metatarsal heads bilaterally.      Laboratory:  All pertinent labs reviewed within the last 24 hours.    Diagnostic Results:  I have reviewed all pertinent imaging results/findings within the past 24 hours.        Assessment/Plan:     * Diabetic foot ulcer  Plan is for surgical debridement of right foot ulceration in the OR on Monday    Dr. Pike consulted due gangrenous changes to digits    MRI ordered on right foot to rule out potential abscess or osteomyelitis    Consulted wound care for daily dressing changes consisting of santyl and betadine    Surgical shoe ordered for patient to wear at all times while walking on the right foot.       Yesenia Buck DPM  Podiatry  [facility]

## 2019-12-06 NOTE — ED PROVIDER NOTES
Encounter Date: 12/5/2019       History     Chief Complaint   Patient presents with    I HAVE A CUT ON MY FOOT THAT IS NOT HEALING FOR PAST TWO MON     38-year-old male with a past medical history of diabetes presents emergency department with a right foot ulcer.  The patient states that he was attempting to shave a callus off of his foot with a razor 2 months ago and cut his foot.  Since that time he has had a progressively worsening foot ulceration.  He has been attempting local wound care with over-the-counter antibiotic ointment with worsening symptoms. The pain is mild-to-moderate and has been constant.  He denies any systemic symptoms such as fevers or chills.        Review of patient's allergies indicates:  No Known Allergies  Past Medical History:   Diagnosis Date    Diabetes mellitus      No past surgical history on file.  Family History   Problem Relation Age of Onset    Diabetes Mother     No Known Problems Father      Social History     Tobacco Use    Smoking status: Current Every Day Smoker     Packs/day: 0.50     Types: Vaping w/o nicotine    Smokeless tobacco: Never Used   Substance Use Topics    Alcohol use: Yes     Alcohol/week: 28.0 standard drinks     Types: 28 Cans of beer per week    Drug use: Yes     Review of Systems   Constitutional: Negative for chills, diaphoresis, fatigue and fever.   HENT: Negative for congestion.    Eyes: Negative for visual disturbance.   Respiratory: Negative for cough, shortness of breath, wheezing and stridor.    Cardiovascular: Negative for chest pain.   Gastrointestinal: Negative for abdominal pain, diarrhea, nausea and vomiting.   Genitourinary: Negative for decreased urine volume, dysuria, flank pain and hematuria.   Musculoskeletal: Positive for gait problem. Negative for back pain.   Skin: Positive for wound. Negative for rash.   Neurological: Negative for weakness, light-headedness, numbness and headaches.   Psychiatric/Behavioral: Negative for  confusion.   All other systems reviewed and are negative.      Physical Exam     Initial Vitals [12/05/19 2103]   BP Pulse Resp Temp SpO2   133/71 (!) 113 16 98.5 °F (36.9 °C) 98 %      MAP       --         Physical Exam    Nursing note and vitals reviewed.  Constitutional: He appears well-developed and well-nourished. No distress.   HENT:   Head: Normocephalic and atraumatic.   Eyes: EOM are normal.   Neck: Normal range of motion. Neck supple.   Cardiovascular: Normal rate, regular rhythm, normal heart sounds and intact distal pulses.   No murmur heard.  Pulmonary/Chest: Breath sounds normal. No respiratory distress. He has no wheezes. He has no rhonchi. He has no rales.   Abdominal: Soft. Bowel sounds are normal. He exhibits no distension. There is no tenderness.   Musculoskeletal: Normal range of motion.   Neurological: He is alert and oriented to person, place, and time. GCS score is 15. GCS eye subscore is 4. GCS verbal subscore is 5. GCS motor subscore is 6.   Skin: Skin is warm and dry. Capillary refill takes less than 2 seconds.   Right foot with 3 cm stage III ulceration overlying medial apply plantar aspect with surrounding erythema and foul-smelling discharge, no induration, dry gangrene to right 1st toe and left 5th toe   Psychiatric: He has a normal mood and affect.         ED Course   Procedures  Labs Reviewed   COMPREHENSIVE METABOLIC PANEL - Abnormal; Notable for the following components:       Result Value    Glucose 208 (*)     BUN, Bld <5 (*)     Calcium 8.4 (*)     Albumin 3.3 (*)     Total Bilirubin 3.3 (*)     Alkaline Phosphatase 239 (*)     AST 92 (*)     ALT 46 (*)     All other components within normal limits   CBC W/ AUTO DIFFERENTIAL - Abnormal; Notable for the following components:    RBC 4.04 (*)     Hemoglobin 13.4 (*)     Hematocrit 39.1 (*)     Mean Corpuscular Hemoglobin 33.2 (*)     Platelets 59 (*)     MPV 13.1 (*)     Gran # (ANC) 1.4 (*)     Gran% 34.4 (*)     All other  components within normal limits   SEDIMENTATION RATE - Abnormal; Notable for the following components:    Sed Rate 21 (*)     All other components within normal limits   C-REACTIVE PROTEIN          Imaging Results          X-Ray Foot Complete Right (In process)               X-Rays:   Independently Interpreted Readings:   Other Readings:  Ft x-ray with no obvious osseous erosion    Medical Decision Making:   ED Management:  38-year-old male presents with ulceration to right foot.  No clinical signs of osteomyelitis at this time however he does have dry gangrene and an ulceration that will require debridement.  He will be admitted on IV antibiotics for Podiatry consult.    Kristie Price MD  Emergency Medicine  12/06/2019 4:00 AM                                   Clinical Impression:       ICD-10-CM ICD-9-CM   1. Diabetic foot ulcer E11.621 250.80    L97.509 707.15   2. Dry gangrene I96 785.4                             Kristie Price MD  12/06/19 0400

## 2019-12-06 NOTE — H&P
[facility]  History & Physical  Date of service: 12/6/2019  At 0250      Patient Name: Flako Quintana  MRN: [id number]  Admission Date: 12/5/2019  Attending Physician: Lavinia Rod DO   Primary Care Provider: Leopoldo Lim MD         Patient information was obtained from patient, past medical records and ER records.     Subjective:     Principal Problem:Diabetic foot ulcer    Chief Complaint:   Chief Complaint   Patient presents with    I HAVE A CUT ON MY FOOT THAT IS NOT HEALING FOR PAST TWO MON        HPI: The patient is a 38 years old male with history of IDDM and cirrhosis.  He presented to the emergency department with nonhealing wounds on his right foot.  He states that he attempted to shave off calluses on the bottom of his right foot about 2 months ago.  He has been putting Neosporin ointment on the sites.  He reports that the wounds have not been healing.  He also noted some brown drainage and foul order recently.  He also reports intermittent moderate to severe pain to his right foot. He denies trauma/injury to the left little toe.     He denies fevers or chills.  He denies shortness of breath, chest pain, abdominal pain, urinary symptoms.  He reports cutting down smoking to 1/3 pack per day.  He reports drinking a couple glasses of wine every few days.      Past Medical History:   Diagnosis Date    Diabetes mellitus        No past surgical history on file.    Review of patient's allergies indicates:  No Known Allergies    No current facility-administered medications on file prior to encounter.      Current Outpatient Medications on File Prior to Encounter   Medication Sig    cyclobenzaprine (FEXMID) 7.5 MG Tab Take 10 mg by mouth 3 (three) times daily as needed.    gabapentin (NEURONTIN) 600 MG tablet Take 600 mg by mouth nightly.    insulin (BASAGLAR KWIKPEN U-100 INSULIN) glargine 100 units/mL (3mL) SubQ pen Inject 23 Units into the skin every evening.     oxyCODONE-acetaminophen (PERCOCET)  mg per tablet Take 1 tablet by mouth every 4 (four) hours as needed for Pain.    thiamine (VITAMIN B-1) 100 MG tablet Take 100 mg by mouth once daily.    traMADol (ULTRAM) 50 mg tablet Take 50 mg by mouth every 12 (twelve) hours as needed for Pain.    folic acid (FOLVITE) 1 MG tablet Take 1 mg by mouth once daily.    MEN'S MULTI-VITAMIN ORAL Take 1 tablet by mouth once daily.     Family History     Problem Relation (Age of Onset)    Diabetes Mother    No Known Problems Father        Tobacco Use    Smoking status: Current Every Day Smoker     Packs/day: 0.50     Types: Vaping w/o nicotine    Smokeless tobacco: Never Used   Substance and Sexual Activity    Alcohol use: Yes     Alcohol/week: 28.0 standard drinks     Types: 28 Cans of beer per week    Drug use: Yes    Sexual activity: Not on file     Review of Systems   All other systems reviewed and are negative.    Objective:     Vital Signs (Most Recent):  Temp: 97.8 °F (36.6 °C) (12/06/19 0055)  Pulse: 83 (12/06/19 0055)  Resp: 18 (12/06/19 0055)  BP: 123/80 (12/06/19 0055)  SpO2: 98 % (12/06/19 0055) Vital Signs (24h Range):  Temp:  [97.8 °F (36.6 °C)-98.5 °F (36.9 °C)] 97.8 °F (36.6 °C)  Pulse:  [] 83  Resp:  [16-18] 18  SpO2:  [98 %] 98 %  BP: (123-133)/(71-80) 123/80     Weight: 78.5 kg (173 lb)  Body mass index is 22.82 kg/m².    Physical Exam   Constitutional: He is oriented to person, place, and time. He appears well-developed and well-nourished. No distress.   HENT:   Head: Normocephalic and atraumatic.   Eyes: Right eye exhibits no discharge. Left eye exhibits no discharge.   Neck: Normal range of motion. Neck supple.   Cardiovascular: Normal rate and regular rhythm.   Pulmonary/Chest: Effort normal and breath sounds normal. He has no wheezes.   Abdominal: Soft. Bowel sounds are normal.   Genitourinary:   Genitourinary Comments: No flank tenderness   Musculoskeletal: Normal range of motion. He  exhibits no edema.   Neurological: He is alert and oriented to person, place, and time.   Skin: Skin is warm and dry. Capillary refill takes less than 2 seconds. He is not diaphoretic.   See images   Psychiatric: He has a normal mood and affect.   Vitals reviewed.                      Significant Labs:   CBC:   Recent Labs   Lab 12/06/19  0014   WBC 4.11   HGB 13.4*   HCT 39.1*   PLT 59*     CMP:   Recent Labs   Lab 12/06/19 0014      K 3.7      CO2 23   *   BUN <5*   CREATININE 0.7   CALCIUM 8.4*   PROT 7.9   ALBUMIN 3.3*   BILITOT 3.3*   ALKPHOS 239*   AST 92*   ALT 46*   ANIONGAP 10   EGFRNONAA >60.0     All pertinent labs within the past 24 hours have been reviewed.    Significant Imaging: I have reviewed all pertinent imaging results/findings within the past 24 hours.    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

## 2019-12-06 NOTE — HPI
Patient is a 38-year-old male with a past medical history of diabetes presents with a right foot ulceration, sub 1st metatarsal head. The patient states this occurred approximately 2 months ago when he cut himself with a razor from trying to shave a callus. Since then, the wound has progressively worsened. He has tried treating with neosporin ointment, but without success. Patient states it has had a foul smelling drainage for several weeks now. He also has gangrene changes on right 1st digit and left 5th digit. He did not know about his gangrenous digits until the ER doctor pointed them out yesterday. Additionally, he has pre-ulcerative calluses located sub 3rd metatarsal heads bilaterally.  He does not see a podiatrist currently.     He denies any systemic symptoms such as fevers or chills.

## 2019-12-06 NOTE — ASSESSMENT & PLAN NOTE
Plan is for surgical debridement of right foot ulceration in the OR on Monday    Dr. Pike consulted due gangrenous changes to digits    MRI ordered on right foot to rule out potential abscess or osteomyelitis    Consulted wound care for daily dressing changes consisting of santyl and betadine    Surgical shoe ordered for patient to wear at all times while walking on the right foot.

## 2019-12-06 NOTE — SUBJECTIVE & OBJECTIVE
Scheduled Meds:   collagenase   Topical (Top) Daily    folic acid  1 mg Oral Daily    gabapentin  600 mg Oral Nightly    insulin detemir U-100  20 Units Subcutaneous QHS    magnesium sulfate IVPB  2 g Intravenous Once    multivitamin  1 tablet Oral Daily    piperacillin-tazobactam (ZOSYN) IVPB  4.5 g Intravenous Q8H    thiamine  100 mg Oral Daily    vancomycin (VANCOCIN) IVPB  1,250 mg Intravenous Q12H     Continuous Infusions:  PRN Meds:acetaminophen, acetaminophen, bisacodyl, cyclobenzaprine, dextrose 50%, dextrose 50%, glucagon (human recombinant), glucose, glucose, insulin aspart U-100, magnesium oxide, magnesium oxide, ondansetron, potassium chloride 10%, potassium chloride 10%, potassium chloride 10%, potassium, sodium phosphates, potassium, sodium phosphates, potassium, sodium phosphates, promethazine (PHENERGAN) IVPB, sodium chloride 0.9%, traMADol    Review of patient's allergies indicates:  No Known Allergies     Past Medical History:   Diagnosis Date    Diabetes mellitus      History reviewed. No pertinent surgical history.    Family History     Problem Relation (Age of Onset)    Diabetes Mother    No Known Problems Father        Tobacco Use    Smoking status: Current Every Day Smoker     Packs/day: 0.50     Types: Vaping w/o nicotine    Smokeless tobacco: Never Used   Substance and Sexual Activity    Alcohol use: Yes     Alcohol/week: 28.0 standard drinks     Types: 28 Cans of beer per week    Drug use: Yes    Sexual activity: Not on file     Review of Systems  Objective:     Vital Signs (Most Recent):  Temp: 98.3 °F (36.8 °C) (12/06/19 0413)  Pulse: 89 (12/06/19 0413)  Resp: 18 (12/06/19 0413)  BP: 130/83 (12/06/19 0413)  SpO2: 98 % (12/06/19 0413) Vital Signs (24h Range):  Temp:  [97.8 °F (36.6 °C)-98.5 °F (36.9 °C)] 98.3 °F (36.8 °C)  Pulse:  [] 89  Resp:  [16-18] 18  SpO2:  [95 %-98 %] 98 %  BP: (108-133)/(63-83) 130/83     Weight: 78.4 kg (172 lb 14.2 oz)  Body mass index is  22.81 kg/m².    Foot Exam    Right Foot/Ankle     Neurovascular  Dorsalis pedis: 2+  Posterior tibial: 1+  Saphenous nerve sensation: diminished  Tibial nerve sensation: diminished  Superficial peroneal nerve sensation: diminished  Deep peroneal nerve sensation: diminished  Sural nerve sensation: diminished      Left Foot/Ankle      Neurovascular  Dorsalis pedis: 2+  Posterior tibial: 1+  Saphenous nerve sensation: diminished  Tibial nerve sensation: diminished  Superficial peroneal nerve sensation: diminished  Deep peroneal nerve sensation: diminished  Sural nerve sensation: diminished          Wound:   Sub 1st metatarsal head.  No current drainage. Surrounding hyperkeratotic skin. Probes to subcutaneous tissue. Moderate surrounding erythema and edema noted.    Gangrene changes with what appears to be underlying blood blisters on tips of right 1st digit and left 5th digit.    Blood blisters on left 3rd and 4th tips of digits.     Pre-ulcerative calluses located sub 3rd metatarsal heads bilaterally.      Laboratory:  All pertinent labs reviewed within the last 24 hours.    Diagnostic Results:  I have reviewed all pertinent imaging results/findings within the past 24 hours.

## 2019-12-06 NOTE — ASSESSMENT & PLAN NOTE
Poor control  Hemoglobin A1c 8.5 about 3 months ago  Continue basal insulin  Low dose sliding scale insulin

## 2019-12-06 NOTE — PROGRESS NOTES
"formerly [facility]  Adult Nutrition  Progress Note    SUMMARY       Recommendations    Recommendation/Intervention: 1. Offered diabetes education, pt refused. 2. RD to monitor pts po intake, wt and labs.   Goals: 1. Pt to consume 100% of assessed nutr needs via po intake.  Nutrition Goal Status: new    Reason for Assessment    Reason For Assessment: identified at risk by screening criteria  Diagnosis: diabetes diagnosis/complications  Relevant Medical History: diabetes  Interdisciplinary Rounds: attended    Nutrition Risk Screen    Nutrition Risk Screen: no indicators present    Nutrition/Diet History    Patient Reported Diet/Restrictions/Preferences: diabetic diet  Spiritual, Cultural Beliefs, Mandaeism Practices, Values that Affect Care: no  Food Allergies: NKFA  Factors Affecting Nutritional Intake: None identified at this time    Anthropometrics    Temp: 98.3 °F (36.8 °C)  Height Method: Stated  Height: 6' 1" (185.4 cm)  Height (inches): 73 in  Weight Method: Bed Scale  Weight: 78.4 kg (172 lb 14.2 oz)  Weight (lb): 172.89 lb  Ideal Body Weight (IBW), Male: 184 lb  % Ideal Body Weight, Male (lb): 93.96 lb  BMI (Calculated): 22.8       Lab/Procedures/Meds    Pertinent Labs Reviewed: reviewed  Pertinent Medications Reviewed: reviewed    Physical Findings/Assessment         Estimated/Assessed Needs    Weight Used For Calorie Calculations: 78 kg (171 lb 15.3 oz)  Energy Calorie Requirements (kcal): 4483-6131  Energy Need Method: Kcal/kg  Protein Requirements:   Weight Used For Protein Calculations: 78 kg (171 lb 15.3 oz)     Estimated Fluid Requirement Method: RDA Method  RDA Method (mL): 2340         Nutrition Prescription Ordered    Current Diet Order: Diabetic    Evaluation of Received Nutrient/Fluid Intake    Energy Calories Required: meeting needs  Protein Required: meeting needs  Fluid Required: meeting needs  Tolerance: tolerating      Nutrition Risk    Level of Risk/Frequency of Follow-up: " moderate     Assessment and Plan    No new Assessment & Plan notes have been filed under this hospital service since the last note was generated.  Service: Nutrition       Monitor and Evaluation    Food and Nutrient Intake: food and beverage intake  Food and Nutrient Adminstration: diet order       Nutrition Follow-Up    RD Follow-up?: Yes  Willa Arrington, [place] 12/06/2019 2:22 PM

## 2019-12-06 NOTE — CONSULTS
Consult Note  Infectious Disease    Reason for Consult:  Diabetic foot infection, gangrene    HPI: Flako Quintana is a   38 y.o. male with diabetes, alcoholism, cirrhosis, smoker who presented to the emergency room 12/6 for bilateral foot wounds.  He reported shaving a callus off of his foot with a razor 2 months ago with a subsequent injury and progressive ulceration.  He was found to have gangrene of the tip of the right great toe and the left 5th toe with blood blisters on the left 3rd and 4th tips along with ulcerations that were full-thickness and calluses.  He was found to have diminished sensation and poor circulation circulation.  Vancomycin and Zosyn were ordered.  MRI of the right foot does not demonstrate osteomyelitis    Review of patient's allergies indicates:  No Known Allergies  Past Medical History:   Diagnosis Date    Diabetes mellitus      History reviewed. No pertinent surgical history.  Social History     Socioeconomic History    Marital status: Single     Spouse name: Not on file    Number of children: Not on file    Years of education: Not on file    Highest education level: Not on file   Occupational History    Not on file   Social Needs    Financial resource strain: Not on file    Food insecurity:     Worry: Not on file     Inability: Not on file    Transportation needs:     Medical: Not on file     Non-medical: Not on file   Tobacco Use    Smoking status: Current Every Day Smoker     Packs/day: 0.50     Types: Vaping w/o nicotine    Smokeless tobacco: Never Used   Substance and Sexual Activity    Alcohol use: Yes     Alcohol/week: 28.0 standard drinks     Types: 28 Cans of beer per week    Drug use: Yes    Sexual activity: Not on file   Lifestyle    Physical activity:     Days per week: Not on file     Minutes per session: Not on file    Stress: Not on file   Relationships    Social connections:     Talks on phone: Not on file     Gets together: Not on file     Attends  Islam service: Not on file     Active member of club or organization: Not on file     Attends meetings of clubs or organizations: Not on file     Relationship status: Not on file   Other Topics Concern    Not on file   Social History Narrative    Not on file     Family History   Problem Relation Age of Onset    Diabetes Mother     No Known Problems Father        Pertinent medications noted:     Review of Systems:   He reports watching his sugars daily, eating properly but does not recognize alcohol consumption as a factor.   He denies walking barefoot.he professes to examining his feet daily.     EXAM & DIAGNOSTICS REVIEWED:   Vitals:     Temp:  [97.8 °F (36.6 °C)-98.5 °F (36.9 °C)]   Temp: 98.3 °F (36.8 °C) (12/06/19 1224)  Pulse: 84 (12/06/19 1419)  Resp: 18 (12/06/19 1419)  BP: 126/80 (12/06/19 1224)  SpO2: 99 % (12/06/19 1419)    Intake/Output Summary (Last 24 hours) at 12/6/2019 1612  Last data filed at 12/6/2019 0403  Gross per 24 hour   Intake 400 ml   Output --   Net 400 ml       General:  In NAD. Alert and attentive, cooperative, comfortable  Eyes:  Anicteric, PERRL, EOMI  ENT:  No ulcers, exudates, thrush, nares patent,   Neck:  supple, no masses or adenopathy appreciated  Lungs: Clear, no consolidation, rales, wheezes, rub  Heart:  RRR, no gallop/murmur/rub noted  Abd:  Soft, NT, ND, normal BS, no masses or organomegaly appreciated.  :  Voids   Musc:  Joints without effusion, swelling, erythema, synovitis, muscle wasting.   Skin:  No rashes. No palmar or plantar lesions. No subungual petechiae  Wound:            Neuro:              Alert, attentive, speech fluent, face symmetric, moves all extremities, no focal weakness. Ambulatory  Psych:  Calm, cooperative     Extrem: No edema, erythema, phlebitis, cellulitis, warm and well perfused with palpable pulses  VAD:       Isolation:      Lines/Tubes/Drains:    General Labs reviewed:  Recent Labs   Lab 12/06/19  0014 12/06/19  0511   WBC 4.11 3.39*    HGB 13.4* 12.9*   HCT 39.1* 38.2*   PLT 59* 56*       Recent Labs   Lab 12/06/19  0014 12/06/19  0511    136   K 3.7 3.5    103   CO2 23 23   BUN <5* <5*   CREATININE 0.7 0.6   CALCIUM 8.4* 8.2*   PROT 7.9  --    BILITOT 3.3*  --    ALKPHOS 239*  --    ALT 46*  --    AST 92*  --            Micro:  Microbiology Results (last 7 days)     Procedure Component Value Units Date/Time    Blood culture [[id number]] Collected:  12/06/19 0819    Order Status:  Completed Specimen:  Blood Updated:  12/06/19 1558     Blood Culture, Routine No Growth to date    Blood culture [[id number]] Collected:  12/06/19 0814    Order Status:  Completed Specimen:  Blood Updated:  12/06/19 1558     Blood Culture, Routine No Growth to date        Imaging Reviewed:   MRI right foot negative for osteomyelitis    Cardiology:    IMPRESSION & PLAN   1. Limited dry gangrene with no secondary infection, normal CRP and negative MRI  2. Diabetes  3. Alcohol and cigarette abuse  4. Cirrhosis per chart and chronic low WBc      Recommendations:  No indication for vancomycin and not even in need of IV zosyn  Not opposed to augmentin  Needs vascular work up  Tdap vaccine given 2018 per epic    Will follow peripherally

## 2019-12-06 NOTE — ASSESSMENT & PLAN NOTE
With dry gangrene on right big toe and dry gangrene on left little toe  Abx with IV Zosyn and Vancomycin  Consult podiatry  Consult wound care

## 2019-12-06 NOTE — HPI
The patient is a 38 years old male with history of IDDM and cirrhosis.  He presented to the emergency department with nonhealing wounds on his right foot.  He states that he attempted to shave off calluses on the bottom of his right foot about 2 months ago.  He has been putting Neosporin ointment on the sites.  He reports that the wounds have not been healing.  He also noted some brown drainage and foul order recently.  He also reports intermittent moderate to severe pain to his right foot. He denies trauma/injury to the left little toe.     He denies fevers or chills.  He denies shortness of breath, chest pain, abdominal pain, urinary symptoms.  He reports cutting down smoking to 1/3 pack per day.  He reports drinking a couple glasses of wine every few days.

## 2019-12-07 LAB
ANION GAP SERPL CALC-SCNC: 10 MMOL/L (ref 8–16)
BASOPHILS # BLD AUTO: 0.06 K/UL (ref 0–0.2)
BASOPHILS NFR BLD: 1.3 % (ref 0–1.9)
BUN SERPL-MCNC: 7 MG/DL (ref 6–20)
CALCIUM SERPL-MCNC: 8.8 MG/DL (ref 8.7–10.5)
CHLORIDE SERPL-SCNC: 105 MMOL/L (ref 95–110)
CO2 SERPL-SCNC: 22 MMOL/L (ref 23–29)
CREAT SERPL-MCNC: 0.7 MG/DL (ref 0.5–1.4)
DIFFERENTIAL METHOD: ABNORMAL
EOSINOPHIL # BLD AUTO: 0.1 K/UL (ref 0–0.5)
EOSINOPHIL NFR BLD: 3.1 % (ref 0–8)
ERYTHROCYTE [DISTWIDTH] IN BLOOD BY AUTOMATED COUNT: 13.3 % (ref 11.5–14.5)
EST. GFR  (AFRICAN AMERICAN): >60 ML/MIN/1.73 M^2
EST. GFR  (NON AFRICAN AMERICAN): >60 ML/MIN/1.73 M^2
GLUCOSE SERPL-MCNC: 143 MG/DL (ref 70–110)
GLUCOSE SERPL-MCNC: 152 MG/DL (ref 70–110)
GLUCOSE SERPL-MCNC: 170 MG/DL (ref 70–110)
GLUCOSE SERPL-MCNC: 224 MG/DL (ref 70–110)
GLUCOSE SERPL-MCNC: 250 MG/DL (ref 70–110)
HCT VFR BLD AUTO: 41.8 % (ref 40–54)
HGB BLD-MCNC: 14.1 G/DL (ref 14–18)
IMM GRANULOCYTES # BLD AUTO: 0.01 K/UL (ref 0–0.04)
IMM GRANULOCYTES NFR BLD AUTO: 0.2 % (ref 0–0.5)
LYMPHOCYTES # BLD AUTO: 1.7 K/UL (ref 1–4.8)
LYMPHOCYTES NFR BLD: 37.9 % (ref 18–48)
MAGNESIUM SERPL-MCNC: 1.8 MG/DL (ref 1.6–2.6)
MCH RBC QN AUTO: 32 PG (ref 27–31)
MCHC RBC AUTO-ENTMCNC: 33.7 G/DL (ref 32–36)
MCV RBC AUTO: 95 FL (ref 82–98)
MONOCYTES # BLD AUTO: 0.7 K/UL (ref 0.3–1)
MONOCYTES NFR BLD: 14.9 % (ref 4–15)
NEUTROPHILS # BLD AUTO: 1.9 K/UL (ref 1.8–7.7)
NEUTROPHILS NFR BLD: 42.6 % (ref 38–73)
NRBC BLD-RTO: 0 /100 WBC
PHOSPHATE SERPL-MCNC: 3.7 MG/DL (ref 2.7–4.5)
PLATELET # BLD AUTO: 56 K/UL (ref 150–350)
PMV BLD AUTO: 12.9 FL (ref 9.2–12.9)
POTASSIUM SERPL-SCNC: 4 MMOL/L (ref 3.5–5.1)
RBC # BLD AUTO: 4.4 M/UL (ref 4.6–6.2)
SODIUM SERPL-SCNC: 137 MMOL/L (ref 136–145)
WBC # BLD AUTO: 4.51 K/UL (ref 3.9–12.7)

## 2019-12-07 PROCEDURE — 36415 COLL VENOUS BLD VENIPUNCTURE: CPT

## 2019-12-07 PROCEDURE — 12000002 HC ACUTE/MED SURGE SEMI-PRIVATE ROOM

## 2019-12-07 PROCEDURE — 94761 N-INVAS EAR/PLS OXIMETRY MLT: CPT

## 2019-12-07 PROCEDURE — 25000003 PHARM REV CODE 250: Performed by: FAMILY MEDICINE

## 2019-12-07 PROCEDURE — 25000003 PHARM REV CODE 250: Performed by: PODIATRIST

## 2019-12-07 PROCEDURE — 80048 BASIC METABOLIC PNL TOTAL CA: CPT

## 2019-12-07 PROCEDURE — 25000003 PHARM REV CODE 250: Performed by: NURSE PRACTITIONER

## 2019-12-07 PROCEDURE — C9399 UNCLASSIFIED DRUGS OR BIOLOG: HCPCS | Performed by: NURSE PRACTITIONER

## 2019-12-07 PROCEDURE — 83735 ASSAY OF MAGNESIUM: CPT

## 2019-12-07 PROCEDURE — 85025 COMPLETE CBC W/AUTO DIFF WBC: CPT

## 2019-12-07 PROCEDURE — 63600175 PHARM REV CODE 636 W HCPCS: Performed by: NURSE PRACTITIONER

## 2019-12-07 PROCEDURE — 84100 ASSAY OF PHOSPHORUS: CPT

## 2019-12-07 RX ORDER — GABAPENTIN 300 MG/1
300 CAPSULE ORAL 2 TIMES DAILY PRN
Status: DISCONTINUED | OUTPATIENT
Start: 2019-12-07 | End: 2019-12-09 | Stop reason: HOSPADM

## 2019-12-07 RX ADMIN — GABAPENTIN 600 MG: 300 CAPSULE ORAL at 09:12

## 2019-12-07 RX ADMIN — INSULIN DETEMIR 20 UNITS: 100 INJECTION, SOLUTION SUBCUTANEOUS at 09:12

## 2019-12-07 RX ADMIN — PIPERACILLIN AND TAZOBACTAM 4.5 G: 4; .5 INJECTION, POWDER, LYOPHILIZED, FOR SOLUTION INTRAVENOUS; PARENTERAL at 05:12

## 2019-12-07 RX ADMIN — PIPERACILLIN AND TAZOBACTAM 4.5 G: 4; .5 INJECTION, POWDER, LYOPHILIZED, FOR SOLUTION INTRAVENOUS; PARENTERAL at 01:12

## 2019-12-07 RX ADMIN — GABAPENTIN 300 MG: 300 CAPSULE ORAL at 01:12

## 2019-12-07 RX ADMIN — FOLIC ACID 1 MG: 1 TABLET ORAL at 10:12

## 2019-12-07 RX ADMIN — THIAMINE HCL TAB 100 MG 100 MG: 100 TAB at 10:12

## 2019-12-07 RX ADMIN — THERA TABS 1 TABLET: TAB at 10:12

## 2019-12-07 RX ADMIN — PIPERACILLIN AND TAZOBACTAM 4.5 G: 4; .5 INJECTION, POWDER, LYOPHILIZED, FOR SOLUTION INTRAVENOUS; PARENTERAL at 09:12

## 2019-12-07 RX ADMIN — INSULIN ASPART 1 UNITS: 100 INJECTION, SOLUTION INTRAVENOUS; SUBCUTANEOUS at 09:12

## 2019-12-07 RX ADMIN — COLLAGENASE SANTYL: 250 OINTMENT TOPICAL at 10:12

## 2019-12-07 NOTE — CONSULTS
[facility]  Vascular Surgery  Consult Note    Inpatient consult to Vascular Surgery  Consult performed by: Ali Khoobehi, MD  Consult ordered by: Yesenia Buck DPM        Subjective:     Chief Complaint/Reason for Admission: Foot ulcers    History of Present Illness: Pt admitted with distal hallux ulcer on the right, left 4th and 5th toe blisters. Pt denies any history of claudication or rest pain. No fevers or chills. Pt has no other complaints.    Medications Prior to Admission   Medication Sig Dispense Refill Last Dose    cyclobenzaprine (FEXMID) 7.5 MG Tab Take 10 mg by mouth 3 (three) times daily as needed.   12/5/2019    gabapentin (NEURONTIN) 600 MG tablet Take 600 mg by mouth nightly.   12/5/2019    insulin (BASAGLAR KWIKPEN U-100 INSULIN) glargine 100 units/mL (3mL) SubQ pen Inject 23 Units into the skin every evening.   12/5/2019    oxyCODONE-acetaminophen (PERCOCET)  mg per tablet Take 1 tablet by mouth every 4 (four) hours as needed for Pain.   12/5/2019    thiamine (VITAMIN B-1) 100 MG tablet Take 100 mg by mouth once daily.   12/5/2019    traMADol (ULTRAM) 50 mg tablet Take 50 mg by mouth every 12 (twelve) hours as needed for Pain.   12/5/2019    folic acid (FOLVITE) 1 MG tablet Take 1 mg by mouth once daily.   Unknown    MEN'S MULTI-VITAMIN ORAL Take 1 tablet by mouth once daily.   Unknown       Review of patient's allergies indicates:  No Known Allergies    Past Medical History:   Diagnosis Date    Diabetes mellitus      History reviewed. No pertinent surgical history.  Family History     Problem Relation (Age of Onset)    Diabetes Mother    No Known Problems Father        Tobacco Use    Smoking status: Current Every Day Smoker     Packs/day: 0.50     Types: Vaping w/o nicotine    Smokeless tobacco: Never Used   Substance and Sexual Activity    Alcohol use: Yes     Alcohol/week: 28.0 standard drinks     Types: 28 Cans of beer per week    Drug use: Yes    Sexual  activity: Not on file     Review of Systems   Constitutional: Negative for chills and fever.   All other systems reviewed and are negative.    Objective:     Vital Signs (Most Recent):  Temp: 98.4 °F (36.9 °C) (12/07/19 0322)  Pulse: 80 (12/07/19 0929)  Resp: 17 (12/07/19 0929)  BP: (!) 144/86 (12/07/19 0322)  SpO2: 98 % (12/07/19 0929) Vital Signs (24h Range):  Temp:  [98.3 °F (36.8 °C)-99.1 °F (37.3 °C)] 98.4 °F (36.9 °C)  Pulse:  [] 80  Resp:  [16-19] 17  SpO2:  [97 %-99 %] 98 %  BP: (126-153)/(80-91) 144/86     Weight: 78.4 kg (172 lb 14.2 oz)  Body mass index is 22.81 kg/m².        Physical Exam   Constitutional: He appears well-developed and well-nourished.   HENT:   Head: Normocephalic and atraumatic.   Eyes: Pupils are equal, round, and reactive to light. EOM are normal.   Cardiovascular: Normal rate and normal heart sounds.   Pulses:       Femoral pulses are 2+ on the right side, and 2+ on the left side.       Dorsalis pedis pulses are 2+ on the right side, and 2+ on the left side.   Ayan feet warm with brisk cap refill  Distal hallux ulcer, left 4th and 5th toe blisters   Pulmonary/Chest: Effort normal and breath sounds normal.   Abdominal: Soft.   Nursing note and vitals reviewed.      Significant Labs:  CBC:   Recent Labs   Lab 12/07/19 0514   WBC 4.51   RBC 4.40*   HGB 14.1   HCT 41.8   PLT 56*   MCV 95   MCH 32.0*   MCHC 33.7     CMP:   Recent Labs   Lab 12/06/19  0014  12/07/19 0514   *   < > 143*   CALCIUM 8.4*   < > 8.8   ALBUMIN 3.3*  --   --    PROT 7.9  --   --       < > 137   K 3.7   < > 4.0   CO2 23   < > 22*      < > 105   BUN <5*   < > 7   CREATININE 0.7   < > 0.7   ALKPHOS 239*  --   --    ALT 46*  --   --    AST 92*  --   --    BILITOT 3.3*  --   --     < > = values in this interval not displayed.       Significant Diagnostics:  I have reviewed all pertinent imaging results/findings within the past 24 hours.    Assessment/Plan:   Pt with ayan foot ulcers. On  clinical exam, no evidence for PVD and strong palpable pulses. Will order arterial US studies to confirm patency of his lower extremity vessels.    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 I will sign off. Please contact us if you have any additional questions.    Ali Khoobehi, MD  Vascular Surgery  Critical access hospital

## 2019-12-07 NOTE — PLAN OF CARE
Problem: Adult Inpatient Plan of Care  Goal: Plan of Care Review  Outcome: Ongoing, Progressing  Goal: Patient-Specific Goal (Individualization)  Outcome: Ongoing, Progressing  Goal: Absence of Hospital-Acquired Illness or Injury  Outcome: Ongoing, Progressing  Goal: Optimal Comfort and Wellbeing  Outcome: Ongoing, Progressing  Goal: Readiness for Transition of Care  Outcome: Ongoing, Progressing  Goal: Rounds/Family Conference  Outcome: Ongoing, Progressing     Problem: Diabetes Comorbidity  Goal: Blood Glucose Level Within Desired Range  Outcome: Ongoing, Progressing

## 2019-12-07 NOTE — SUBJECTIVE & OBJECTIVE
Review of Systems     All systems reviewed and are negative except as noted per above.    Objective:        Physical Exam

## 2019-12-07 NOTE — PROGRESS NOTES
"[facility] Medicine  Progress Note    Patient Name: Flako Quintana  MRN: [id number]  Patient Class: IP- Inpatient   Admission Date: 12/5/2019  Length of Stay: 1 days  Attending Physician: Анна Bhatt MD  Primary Care Provider: Leopoldo Lim MD    Subjective:     Principal Problem:Diabetic foot ulcer    HPI:  The patient is a 38 years old male with history of IDDM and cirrhosis.  He presented to the emergency department with nonhealing wounds on his right foot.  He states that he attempted to shave off calluses on the bottom of his right foot about 2 months ago.  He has been putting Neosporin ointment on the sites.  He reports that the wounds have not been healing.  He also noted some brown drainage and foul order recently.  He also reports intermittent moderate to severe pain to his right foot. He denies trauma/injury to the left little toe.     He denies fevers or chills.  He denies shortness of breath, chest pain, abdominal pain, urinary symptoms.  He reports cutting down smoking to 1/3 pack per day.  He reports drinking a couple glasses of wine every few days.    Overview/Hospital Course:    12/7: pt reported burning pain in feet that resolved with gabapentin.  No other pain.  No subjective fever or chills.    Review of Systems     All systems reviewed and are negative except as noted per above.    Objective:      /87   Pulse 90   Temp 99 °F (37.2 °C) (Oral)   Resp 18   Ht 6' 1" (1.854 m)   Wt 78.4 kg (172 lb 14.2 oz)   SpO2 99%   BMI 22.81 kg/m²     Physical Exam  Gen: alert, responsive  HEENT:  Eyes - no pallor  External ears with no lesions  Nares patent  Mouth - lips chapped  CV: RRR  Lungs: CTA B/L  Abd: +BS, soft, NT, ND  Ext: no edema; gangrenous changes to toes; distal pulses 2+  Skin: warm, dry; changes as noted above; ulcerations on plantar feet b/l  Neuro: grossly intact  Psych: pleasant    All labs, images, and other studies reviewed personally by " me.    Assessment/Plan:      * Diabetic foot ulcer  With dry gangrene on right big toe and dry gangrene on left little toe  IV Zosyn and Vancomycin - pt to be discharged on Augmentin per ID recs  ID, podiatry, vascular, and wound care following, thank you  Surgery Monday  Lower extremity dopplers: no significant stenosis   MRI negative for osteomyelitis     Thrombocytopenia 2/2 liver cirrhosis 2/2 alcohol abuse  Continue home medication  Monitoring  DVT ppx with lovenox held    Type 2 diabetes mellitus with hyperglycemia, with long-term current use of insulin  Poor control  Hemoglobin A1c 8.5 about 3 months ago  Continue basal insulin  Low dose sliding scale insulin    Cigarette nicotine dependence without complication  Encouraged smoking cessation    VTE Risk Mitigation (From admission, onward)         Ordered     IP VTE LOW RISK PATIENT  Once      12/06/19 0246     Place sequential compression device  Until discontinued      12/06/19 0246                Анна Bhatt MD  Department of Hospital Medicine   [facility]

## 2019-12-08 ENCOUNTER — ANESTHESIA EVENT (OUTPATIENT)
Dept: SURGERY | Facility: HOSPITAL | Age: 39
DRG: 623 | End: 2019-12-08
Payer: MEDICAID

## 2019-12-08 LAB
ANION GAP SERPL CALC-SCNC: 11 MMOL/L (ref 8–16)
BASOPHILS # BLD AUTO: 0.07 K/UL (ref 0–0.2)
BASOPHILS NFR BLD: 1.6 % (ref 0–1.9)
BUN SERPL-MCNC: 8 MG/DL (ref 6–20)
CALCIUM SERPL-MCNC: 8.9 MG/DL (ref 8.7–10.5)
CHLORIDE SERPL-SCNC: 103 MMOL/L (ref 95–110)
CO2 SERPL-SCNC: 21 MMOL/L (ref 23–29)
CREAT SERPL-MCNC: 0.7 MG/DL (ref 0.5–1.4)
DIFFERENTIAL METHOD: ABNORMAL
EOSINOPHIL # BLD AUTO: 0.1 K/UL (ref 0–0.5)
EOSINOPHIL NFR BLD: 2.2 % (ref 0–8)
ERYTHROCYTE [DISTWIDTH] IN BLOOD BY AUTOMATED COUNT: 13.6 % (ref 11.5–14.5)
EST. GFR  (AFRICAN AMERICAN): >60 ML/MIN/1.73 M^2
EST. GFR  (NON AFRICAN AMERICAN): >60 ML/MIN/1.73 M^2
GLUCOSE SERPL-MCNC: 145 MG/DL (ref 70–110)
GLUCOSE SERPL-MCNC: 176 MG/DL (ref 70–110)
GLUCOSE SERPL-MCNC: 200 MG/DL (ref 70–110)
GLUCOSE SERPL-MCNC: 266 MG/DL (ref 70–110)
GLUCOSE SERPL-MCNC: 77 MG/DL (ref 70–110)
HCT VFR BLD AUTO: 40.5 % (ref 40–54)
HGB BLD-MCNC: 14.2 G/DL (ref 14–18)
IMM GRANULOCYTES # BLD AUTO: 0.02 K/UL (ref 0–0.04)
IMM GRANULOCYTES NFR BLD AUTO: 0.4 % (ref 0–0.5)
LYMPHOCYTES # BLD AUTO: 1.4 K/UL (ref 1–4.8)
LYMPHOCYTES NFR BLD: 31.3 % (ref 18–48)
MAGNESIUM SERPL-MCNC: 1.6 MG/DL (ref 1.6–2.6)
MCH RBC QN AUTO: 32.9 PG (ref 27–31)
MCHC RBC AUTO-ENTMCNC: 35.1 G/DL (ref 32–36)
MCV RBC AUTO: 94 FL (ref 82–98)
MONOCYTES # BLD AUTO: 0.7 K/UL (ref 0.3–1)
MONOCYTES NFR BLD: 15.1 % (ref 4–15)
NEUTROPHILS # BLD AUTO: 2.2 K/UL (ref 1.8–7.7)
NEUTROPHILS NFR BLD: 49.4 % (ref 38–73)
NRBC BLD-RTO: 0 /100 WBC
PHOSPHATE SERPL-MCNC: 4.8 MG/DL (ref 2.7–4.5)
PLATELET # BLD AUTO: 61 K/UL (ref 150–350)
PMV BLD AUTO: 12.7 FL (ref 9.2–12.9)
POTASSIUM SERPL-SCNC: 3.7 MMOL/L (ref 3.5–5.1)
RBC # BLD AUTO: 4.31 M/UL (ref 4.6–6.2)
SODIUM SERPL-SCNC: 135 MMOL/L (ref 136–145)
WBC # BLD AUTO: 4.5 K/UL (ref 3.9–12.7)

## 2019-12-08 PROCEDURE — 25000003 PHARM REV CODE 250: Performed by: NURSE PRACTITIONER

## 2019-12-08 PROCEDURE — 84100 ASSAY OF PHOSPHORUS: CPT

## 2019-12-08 PROCEDURE — 83735 ASSAY OF MAGNESIUM: CPT

## 2019-12-08 PROCEDURE — C9399 UNCLASSIFIED DRUGS OR BIOLOG: HCPCS | Performed by: NURSE PRACTITIONER

## 2019-12-08 PROCEDURE — 36415 COLL VENOUS BLD VENIPUNCTURE: CPT

## 2019-12-08 PROCEDURE — 25000003 PHARM REV CODE 250: Performed by: FAMILY MEDICINE

## 2019-12-08 PROCEDURE — 94761 N-INVAS EAR/PLS OXIMETRY MLT: CPT

## 2019-12-08 PROCEDURE — 99232 SBSQ HOSP IP/OBS MODERATE 35: CPT | Mod: ,,, | Performed by: PODIATRIST

## 2019-12-08 PROCEDURE — 80048 BASIC METABOLIC PNL TOTAL CA: CPT

## 2019-12-08 PROCEDURE — 12000002 HC ACUTE/MED SURGE SEMI-PRIVATE ROOM

## 2019-12-08 PROCEDURE — 85025 COMPLETE CBC W/AUTO DIFF WBC: CPT

## 2019-12-08 PROCEDURE — 63600175 PHARM REV CODE 636 W HCPCS: Performed by: NURSE PRACTITIONER

## 2019-12-08 PROCEDURE — 99232 PR SUBSEQUENT HOSPITAL CARE,LEVL II: ICD-10-PCS | Mod: ,,, | Performed by: PODIATRIST

## 2019-12-08 RX ADMIN — COLLAGENASE SANTYL: 250 OINTMENT TOPICAL at 11:12

## 2019-12-08 RX ADMIN — PIPERACILLIN AND TAZOBACTAM 4.5 G: 4; .5 INJECTION, POWDER, LYOPHILIZED, FOR SOLUTION INTRAVENOUS; PARENTERAL at 05:12

## 2019-12-08 RX ADMIN — THIAMINE HCL TAB 100 MG 100 MG: 100 TAB at 11:12

## 2019-12-08 RX ADMIN — GABAPENTIN 600 MG: 300 CAPSULE ORAL at 08:12

## 2019-12-08 RX ADMIN — INSULIN ASPART 1 UNITS: 100 INJECTION, SOLUTION INTRAVENOUS; SUBCUTANEOUS at 08:12

## 2019-12-08 RX ADMIN — INSULIN DETEMIR 20 UNITS: 100 INJECTION, SOLUTION SUBCUTANEOUS at 08:12

## 2019-12-08 RX ADMIN — PIPERACILLIN AND TAZOBACTAM 4.5 G: 4; .5 INJECTION, POWDER, LYOPHILIZED, FOR SOLUTION INTRAVENOUS; PARENTERAL at 01:12

## 2019-12-08 RX ADMIN — FOLIC ACID 1 MG: 1 TABLET ORAL at 11:12

## 2019-12-08 RX ADMIN — PIPERACILLIN AND TAZOBACTAM 4.5 G: 4; .5 INJECTION, POWDER, LYOPHILIZED, FOR SOLUTION INTRAVENOUS; PARENTERAL at 08:12

## 2019-12-08 RX ADMIN — THERA TABS 1 TABLET: TAB at 11:12

## 2019-12-08 NOTE — PROGRESS NOTES
[facility]  Podiatry  Progress Note    Patient Name: Flako Quintana  MRN: [id number]  Admission Date: 12/5/2019  Hospital Length of Stay: 2 days  Attending Physician: Анна Bhatt MD  Primary Care Provider: Leopoldo Lim MD     Subjective:     Interval History:  Patient resting in bed.  Dressings intact on right foot. The patient states he is eager to go home following OR debridement of both feet tomorrow.         Follow-up For: Procedure(s) (LRB):  DEBRIDEMENT, FOOT (Bilateral)    Post-Operative Day:      Scheduled Meds:   collagenase   Topical (Top) Daily    folic acid  1 mg Oral Daily    gabapentin  600 mg Oral Nightly    insulin detemir U-100  20 Units Subcutaneous QHS    multivitamin  1 tablet Oral Daily    piperacillin-tazobactam (ZOSYN) IVPB  4.5 g Intravenous Q8H    thiamine  100 mg Oral Daily     Continuous Infusions:  PRN Meds:acetaminophen, acetaminophen, bisacodyl, cyclobenzaprine, dextrose 50%, dextrose 50%, gabapentin, glucagon (human recombinant), glucose, glucose, insulin aspart U-100, magnesium oxide, magnesium oxide, ondansetron, potassium chloride 10%, potassium chloride 10%, potassium chloride 10%, potassium, sodium phosphates, potassium, sodium phosphates, potassium, sodium phosphates, promethazine (PHENERGAN) IVPB, sodium chloride 0.9%, traMADol    Review of Systems  Objective:     Vital Signs (Most Recent):  Temp: 98.5 °F (36.9 °C) (12/08/19 0759)  Pulse: 88 (12/08/19 0759)  Resp: 16 (12/08/19 0759)  BP: 124/79 (12/08/19 0759)  SpO2: 98 % (12/08/19 0759) Vital Signs (24h Range):  Temp:  [97.4 °F (36.3 °C)-99.3 °F (37.4 °C)] 98.5 °F (36.9 °C)  Pulse:  [85-99] 88  Resp:  [16-18] 16  SpO2:  [96 %-99 %] 98 %  BP: (121-146)/(79-87) 124/79     Weight: 78.4 kg (172 lb 14.2 oz)  Body mass index is 22.81 kg/m².    Foot Exam  Right Foot/Ankle      Neurovascular  Dorsalis pedis: 2+  Posterior tibial: 1+  Saphenous nerve sensation: diminished  Tibial nerve sensation:  diminished  Superficial peroneal nerve sensation: diminished  Deep peroneal nerve sensation: diminished  Sural nerve sensation: diminished        Left Foot/Ankle       Neurovascular  Dorsalis pedis: 2+  Posterior tibial: 1+  Saphenous nerve sensation: diminished  Tibial nerve sensation: diminished  Superficial peroneal nerve sensation: diminished  Deep peroneal nerve sensation: diminished  Sural nerve sensation: diminished              Wound:   Sub 1st metatarsal head.  No current drainage. Surrounding hyperkeratotic skin. Probes to subcutaneous tissue. Moderate surrounding erythema and edema noted.     Gangrene changes with what appears to be underlying blood blisters on tips of right 1st digit and left 5th digit.     Blood blisters on left 3rd and 4th tips of digits.      Pre-ulcerative calluses located sub 3rd metatarsal heads bilaterally.     Laboratory:  All pertinent labs reviewed within the last 24 hours.    Diagnostic Results:  I have reviewed all pertinent imaging results/findings within the past 24 hours.      Assessment/Plan:   Plan is for surgical debridement of right foot ulceration in the OR on Monday    Patient will need to follow up with me in the outpatient [facility] once he is discharged.    No vascular intervention necessary.      Yesenia Buck DPM  Podiatry  [facility]

## 2019-12-08 NOTE — SUBJECTIVE & OBJECTIVE
Subjective:     Interval History:  Patient resting in bed.  Dressings intact on right foot. The patient states he is eager to go home following OR debridement of both feet tomorrow.         Follow-up For: Procedure(s) (LRB):  DEBRIDEMENT, FOOT (Bilateral)    Post-Operative Day:      Scheduled Meds:   collagenase   Topical (Top) Daily    folic acid  1 mg Oral Daily    gabapentin  600 mg Oral Nightly    insulin detemir U-100  20 Units Subcutaneous QHS    multivitamin  1 tablet Oral Daily    piperacillin-tazobactam (ZOSYN) IVPB  4.5 g Intravenous Q8H    thiamine  100 mg Oral Daily     Continuous Infusions:  PRN Meds:acetaminophen, acetaminophen, bisacodyl, cyclobenzaprine, dextrose 50%, dextrose 50%, gabapentin, glucagon (human recombinant), glucose, glucose, insulin aspart U-100, magnesium oxide, magnesium oxide, ondansetron, potassium chloride 10%, potassium chloride 10%, potassium chloride 10%, potassium, sodium phosphates, potassium, sodium phosphates, potassium, sodium phosphates, promethazine (PHENERGAN) IVPB, sodium chloride 0.9%, traMADol    Review of Systems  Objective:     Vital Signs (Most Recent):  Temp: 98.5 °F (36.9 °C) (12/08/19 0759)  Pulse: 88 (12/08/19 0759)  Resp: 16 (12/08/19 0759)  BP: 124/79 (12/08/19 0759)  SpO2: 98 % (12/08/19 0759) Vital Signs (24h Range):  Temp:  [97.4 °F (36.3 °C)-99.3 °F (37.4 °C)] 98.5 °F (36.9 °C)  Pulse:  [85-99] 88  Resp:  [16-18] 16  SpO2:  [96 %-99 %] 98 %  BP: (121-146)/(79-87) 124/79     Weight: 78.4 kg (172 lb 14.2 oz)  Body mass index is 22.81 kg/m².    Foot Exam  Right Foot/Ankle      Neurovascular  Dorsalis pedis: 2+  Posterior tibial: 1+  Saphenous nerve sensation: diminished  Tibial nerve sensation: diminished  Superficial peroneal nerve sensation: diminished  Deep peroneal nerve sensation: diminished  Sural nerve sensation: diminished        Left Foot/Ankle       Neurovascular  Dorsalis pedis: 2+  Posterior tibial: 1+  Saphenous nerve sensation:  diminished  Tibial nerve sensation: diminished  Superficial peroneal nerve sensation: diminished  Deep peroneal nerve sensation: diminished  Sural nerve sensation: diminished              Wound:   Sub 1st metatarsal head.  No current drainage. Surrounding hyperkeratotic skin. Probes to subcutaneous tissue. Moderate surrounding erythema and edema noted.     Gangrene changes with what appears to be underlying blood blisters on tips of right 1st digit and left 5th digit.     Blood blisters on left 3rd and 4th tips of digits.      Pre-ulcerative calluses located sub 3rd metatarsal heads bilaterally.     Laboratory:  All pertinent labs reviewed within the last 24 hours.    Diagnostic Results:  I have reviewed all pertinent imaging results/findings within the past 24 hours.

## 2019-12-08 NOTE — ASSESSMENT & PLAN NOTE
Plan is for surgical debridement of right foot ulceration in the OR on Monday.     Patient will need to follow up with me, once discharged, in the [facility] outpatient wound care center.     Vascular assessment still to be done.

## 2019-12-08 NOTE — ANESTHESIA PREPROCEDURE EVALUATION
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 No pertinent surgical history.     Tobacco Use:  The patient  reports that he has been smoking vaping w/o nicotine. He has been smoking about 0.50 packs per day. He has never used smokeless tobacco.     Results for orders placed or performed during the hospital encounter of 09/05/19   EKG 12-lead    Collection Time: 09/05/19 11:58 AM    Narrative    Test Reason : R40.4,    Vent. Rate : 069 BPM     Atrial Rate : 069 BPM     P-R Int : 132 ms          QRS Dur : 080 ms      QT Int : 416 ms       P-R-T Axes : 064 -07 027 degrees     QTc Int : 445 ms    Normal sinus rhythm  Normal ECG.  When compared with ECG of 27-AUG-2019 21:59,  No significant change was found  Confirmed by Raina LUIS, Lisa ([id number]) on 9/5/2019 6:57:00 PM    Referred By:             Confirmed By:Lisa Paris MD        Imaging Results          X-Ray Foot Complete Right (Final result)  Result time 12/06/19 06:18:40    Final result by Susi Donnelly MD (12/06/19 06:18:40)                 Narrative:    PROCEDURE:    XR FOOT COMPLETE 3 VIEW RIGHT  dated  12/5/2019 11:16 PM    CLINICAL HISTORY:   Male 38 years of age.   right diabetic foot ulcer  for 2 months    TECHNIQUE:  3 views right foot    PREVIOUS STUDIES:  None Available    FINDINGS:    Ulcer is well seen on the lateral view, in the plantar soft tissues at  the level of the metatarsal heads. The diameter is approximately 2 cm.  The adjacent soft tissue is thickened. The medial aspect of the  first  metatarsal head is decreased in attenuation without erosion or  cortical thickening. The joint space is maintained. There is no  fracture. Bones are diffusely mildly demineralized.    IMPRESSION:    Soft tissue thickening and ulceration adjacent to the first metatarsal  head. Focal osteopenia of the medial first metatarsal head, possibly  early osteomyelitis. No erosion.    Electronically Signed by Susi Donnelly on 12/6/2019 7:19 AM                               Lab Results   Component Value Date    WBC 4.50 12/08/2019    HGB 14.2 12/08/2019    HCT 40.5 12/08/2019    MCV 94 12/08/2019    PLT 61 (L) 12/08/2019     BMP  Lab Results   Component Value Date     (L) 12/08/2019    K 3.7 12/08/2019     12/08/2019    CO2 21 (L) 12/08/2019    BUN 8 12/08/2019    CREATININE 0.7 12/08/2019    CALCIUM 8.9 12/08/2019    ANIONGAP 11 12/08/2019    ESTGFRAFRICA >60.0 12/08/2019    EGFRNONAA >60.0 12/08/2019             Pre-op Assessment    I have reviewed the Patient Summary Reports.    I have reviewed the Nursing Notes.   I have reviewed the Medications.   Steroids Taken In Past Year:     Review of Systems  Anesthesia Hx:  No previous Anesthesia  Denies Family Hx of Anesthesia complications.    Social:  Smoker, Alcohol Use 28 drinks per week   Hematology/Oncology:        Hematology Comments: Thrombocytopenia, plts 61   Cardiovascular:  Cardiovascular Normal Exercise tolerance: good   Functional Capacity good / => 4 METS    Pulmonary:  Pulmonary Normal    Renal/:  Renal/ Normal     Hepatic/GI:   Liver Disease, (cirrhosis)    Musculoskeletal:   Neck pain with left radicular sx. Had cervical BARBARA 2 months ago Spine Disorders: cervical    Neurological:   Peripheral Neuropathy    Endocrine:   Diabetes, using insulin        Physical Exam  General:  Well nourished beard    Airway/Jaw/Neck:  Airway Findings: Mouth Opening: Normal Mallampati: II  TM Distance: Normal, at least 6 cm  Jaw/Neck Findings:  Neck ROM: Normal  ROM      Dental:  Dental Findings:    Chest/Lungs:  Chest/Lungs Findings: Clear to auscultation, Normal Respiratory Rate     Heart/Vascular:  Heart Findings: Rate: Normal  Rhythm: Regular Rhythm  Sounds: Normal        Mental Status:  Mental Status Findings:  Cooperative, Alert and Oriented         Anesthesia Plan  Type of Anesthesia, risks & benefits discussed:  Anesthesia Type:  general  Patient's Preference:   Intra-op Monitoring Plan: standard ASA monitors  Intra-op Monitoring Plan Comments:   Post Op Pain Control Plan: multimodal analgesia  Post Op Pain Control Plan Comments:   Induction:   IV  Beta Blocker:  Patient is not currently on a Beta-Blocker (No further documentation required).       Informed Consent: Patient understands risks and agrees with Anesthesia plan.  Questions answered. Anesthesia consent signed with patient.  ASA Score: 2     Day of Surgery Review of History & Physical:    H&P update referred to the surgeon.     Anesthesia Plan Notes: General, LMA ok.  Multimodal: gabapentin 300mg, no acetaminophen (cirrhosis)  PONV prophylaxis: Pepcid 20mg, zofran 4mg

## 2019-12-09 ENCOUNTER — ANESTHESIA (OUTPATIENT)
Dept: SURGERY | Facility: HOSPITAL | Age: 39
DRG: 623 | End: 2019-12-09
Payer: MEDICAID

## 2019-12-09 VITALS
SYSTOLIC BLOOD PRESSURE: 112 MMHG | RESPIRATION RATE: 16 BRPM | DIASTOLIC BLOOD PRESSURE: 70 MMHG | WEIGHT: 172.88 LBS | HEIGHT: 73 IN | BODY MASS INDEX: 22.91 KG/M2 | TEMPERATURE: 98 F | OXYGEN SATURATION: 99 % | HEART RATE: 84 BPM

## 2019-12-09 LAB
ANION GAP SERPL CALC-SCNC: 9 MMOL/L (ref 8–16)
BASOPHILS # BLD AUTO: 0.08 K/UL (ref 0–0.2)
BASOPHILS NFR BLD: 1.9 % (ref 0–1.9)
BUN SERPL-MCNC: 9 MG/DL (ref 6–20)
CALCIUM SERPL-MCNC: 8.9 MG/DL (ref 8.7–10.5)
CHLORIDE SERPL-SCNC: 106 MMOL/L (ref 95–110)
CO2 SERPL-SCNC: 22 MMOL/L (ref 23–29)
CREAT SERPL-MCNC: 0.7 MG/DL (ref 0.5–1.4)
DIFFERENTIAL METHOD: ABNORMAL
EOSINOPHIL # BLD AUTO: 0.2 K/UL (ref 0–0.5)
EOSINOPHIL NFR BLD: 3.8 % (ref 0–8)
ERYTHROCYTE [DISTWIDTH] IN BLOOD BY AUTOMATED COUNT: 13.9 % (ref 11.5–14.5)
EST. GFR  (AFRICAN AMERICAN): >60 ML/MIN/1.73 M^2
EST. GFR  (NON AFRICAN AMERICAN): >60 ML/MIN/1.73 M^2
GLUCOSE SERPL-MCNC: 100 MG/DL (ref 70–110)
GLUCOSE SERPL-MCNC: 101 MG/DL (ref 70–110)
GLUCOSE SERPL-MCNC: 116 MG/DL (ref 70–110)
HCT VFR BLD AUTO: 41.4 % (ref 40–54)
HGB BLD-MCNC: 14.2 G/DL (ref 14–18)
IMM GRANULOCYTES # BLD AUTO: 0.01 K/UL (ref 0–0.04)
IMM GRANULOCYTES NFR BLD AUTO: 0.2 % (ref 0–0.5)
LEFT ABI: 1.16
LEFT ARM BP: 141 MMHG
LEFT CALF BP: 170 MMHG
LEFT DORSALIS PEDIS: 163 MMHG
LEFT TBI: 0.9
LEFT TOE PRESSURE: 127 MMHG
LEFT UPPER LEG BP: 168 MMHG
LYMPHOCYTES # BLD AUTO: 1.5 K/UL (ref 1–4.8)
LYMPHOCYTES NFR BLD: 34.8 % (ref 18–48)
MAGNESIUM SERPL-MCNC: 1.6 MG/DL (ref 1.6–2.6)
MCH RBC QN AUTO: 32.7 PG (ref 27–31)
MCHC RBC AUTO-ENTMCNC: 34.3 G/DL (ref 32–36)
MCV RBC AUTO: 95 FL (ref 82–98)
MONOCYTES # BLD AUTO: 0.8 K/UL (ref 0.3–1)
MONOCYTES NFR BLD: 18.4 % (ref 4–15)
NEUTROPHILS # BLD AUTO: 1.7 K/UL (ref 1.8–7.7)
NEUTROPHILS NFR BLD: 40.9 % (ref 38–73)
NRBC BLD-RTO: 0 /100 WBC
PHOSPHATE SERPL-MCNC: 5 MG/DL (ref 2.7–4.5)
PLATELET # BLD AUTO: 64 K/UL (ref 150–350)
PMV BLD AUTO: 12.1 FL (ref 9.2–12.9)
POTASSIUM SERPL-SCNC: 3.6 MMOL/L (ref 3.5–5.1)
RBC # BLD AUTO: 4.34 M/UL (ref 4.6–6.2)
RIGHT ABI: 1.16
RIGHT ARM BP: 136 MMHG
RIGHT CALF BP: 157 MMHG
RIGHT DORSALIS PEDIS: 163 MMHG
RIGHT TBI: 0.9
RIGHT TOE PRESSURE: 127 MMHG
RIGHT UPPER LEG BP: 164 MMHG
SODIUM SERPL-SCNC: 137 MMOL/L (ref 136–145)
WBC # BLD AUTO: 4.25 K/UL (ref 3.9–12.7)

## 2019-12-09 PROCEDURE — 25000003 PHARM REV CODE 250: Performed by: PODIATRIST

## 2019-12-09 PROCEDURE — 37000009 HC ANESTHESIA EA ADD 15 MINS: Performed by: PODIATRIST

## 2019-12-09 PROCEDURE — 84100 ASSAY OF PHOSPHORUS: CPT

## 2019-12-09 PROCEDURE — 27000673 HC TUBING BLOOD Y: Performed by: ANESTHESIOLOGY

## 2019-12-09 PROCEDURE — 37000008 HC ANESTHESIA 1ST 15 MINUTES: Performed by: PODIATRIST

## 2019-12-09 PROCEDURE — 25000003 PHARM REV CODE 250: Performed by: ANESTHESIOLOGY

## 2019-12-09 PROCEDURE — 36000707: Performed by: PODIATRIST

## 2019-12-09 PROCEDURE — 71000033 HC RECOVERY, INTIAL HOUR: Performed by: PODIATRIST

## 2019-12-09 PROCEDURE — S0028 INJECTION, FAMOTIDINE, 20 MG: HCPCS | Performed by: NURSE ANESTHETIST, CERTIFIED REGISTERED

## 2019-12-09 PROCEDURE — 83735 ASSAY OF MAGNESIUM: CPT

## 2019-12-09 PROCEDURE — 27202107 HC XP QUATRO SENSOR: Performed by: ANESTHESIOLOGY

## 2019-12-09 PROCEDURE — 94761 N-INVAS EAR/PLS OXIMETRY MLT: CPT

## 2019-12-09 PROCEDURE — 10140 I&D HMTMA SEROMA/FLUID COLLJ: CPT | Mod: LT,,, | Performed by: PODIATRIST

## 2019-12-09 PROCEDURE — 63600175 PHARM REV CODE 636 W HCPCS: Performed by: NURSE ANESTHETIST, CERTIFIED REGISTERED

## 2019-12-09 PROCEDURE — 25000003 PHARM REV CODE 250: Performed by: NURSE ANESTHETIST, CERTIFIED REGISTERED

## 2019-12-09 PROCEDURE — 80048 BASIC METABOLIC PNL TOTAL CA: CPT

## 2019-12-09 PROCEDURE — 36000706: Performed by: PODIATRIST

## 2019-12-09 PROCEDURE — 63600175 PHARM REV CODE 636 W HCPCS: Performed by: ANESTHESIOLOGY

## 2019-12-09 PROCEDURE — 82962 GLUCOSE BLOOD TEST: CPT

## 2019-12-09 PROCEDURE — 27201107 HC STYLET, STANDARD: Performed by: ANESTHESIOLOGY

## 2019-12-09 PROCEDURE — 15275 SKIN SUB GRAFT FACE/NK/HF/G: CPT | Mod: 51,,, | Performed by: PODIATRIST

## 2019-12-09 PROCEDURE — 27000080 OPTIME MED/SURG SUP & DEVICES GENERAL CLASSIFICATION: Performed by: PODIATRIST

## 2019-12-09 PROCEDURE — 10140 PR DRAINAGE OF HEMATOMA/FLUID: ICD-10-PCS | Mod: LT,,, | Performed by: PODIATRIST

## 2019-12-09 PROCEDURE — 27000671 HC TUBING MICROBORE EXT: Performed by: ANESTHESIOLOGY

## 2019-12-09 PROCEDURE — 63600175 PHARM REV CODE 636 W HCPCS: Performed by: NURSE PRACTITIONER

## 2019-12-09 PROCEDURE — 85025 COMPLETE CBC W/AUTO DIFF WBC: CPT

## 2019-12-09 PROCEDURE — 36415 COLL VENOUS BLD VENIPUNCTURE: CPT

## 2019-12-09 PROCEDURE — 15275 PR SKIN SUB GRAFT FACE/NK/HF/G UP TO 100 SQCM: ICD-10-PCS | Mod: 51,,, | Performed by: PODIATRIST

## 2019-12-09 DEVICE — IMPLANTABLE DEVICE: Type: IMPLANTABLE DEVICE | Site: FOOT | Status: FUNCTIONAL

## 2019-12-09 RX ORDER — IBUPROFEN 800 MG/1
800 TABLET ORAL EVERY 6 HOURS PRN
Qty: 15 TABLET | Refills: 0 | Status: SHIPPED | OUTPATIENT
Start: 2019-12-09 | End: 2020-02-07 | Stop reason: CLARIF

## 2019-12-09 RX ORDER — NEOSTIGMINE METHYLSULFATE 1 MG/ML
INJECTION, SOLUTION INTRAVENOUS
Status: DISCONTINUED | OUTPATIENT
Start: 2019-12-09 | End: 2019-12-09

## 2019-12-09 RX ORDER — SUCCINYLCHOLINE CHLORIDE 20 MG/ML
INJECTION INTRAMUSCULAR; INTRAVENOUS
Status: DISCONTINUED | OUTPATIENT
Start: 2019-12-09 | End: 2019-12-09

## 2019-12-09 RX ORDER — ONDANSETRON 2 MG/ML
4 INJECTION INTRAMUSCULAR; INTRAVENOUS DAILY PRN
Status: DISCONTINUED | OUTPATIENT
Start: 2019-12-09 | End: 2019-12-09 | Stop reason: HOSPADM

## 2019-12-09 RX ORDER — HYDROCODONE BITARTRATE AND ACETAMINOPHEN 10; 325 MG/1; MG/1
1 TABLET ORAL EVERY 4 HOURS PRN
Status: DISCONTINUED | OUTPATIENT
Start: 2019-12-09 | End: 2019-12-09 | Stop reason: HOSPADM

## 2019-12-09 RX ORDER — ROCURONIUM BROMIDE 10 MG/ML
INJECTION, SOLUTION INTRAVENOUS
Status: DISCONTINUED | OUTPATIENT
Start: 2019-12-09 | End: 2019-12-09

## 2019-12-09 RX ORDER — SODIUM CHLORIDE 0.9 % (FLUSH) 0.9 %
10 SYRINGE (ML) INJECTION
Status: DISCONTINUED | OUTPATIENT
Start: 2019-12-09 | End: 2019-12-09 | Stop reason: HOSPADM

## 2019-12-09 RX ORDER — OXYCODONE AND ACETAMINOPHEN 5; 325 MG/1; MG/1
1 TABLET ORAL EVERY 4 HOURS PRN
Qty: 15 TABLET | Refills: 0 | Status: SHIPPED | OUTPATIENT
Start: 2019-12-09 | End: 2020-02-07 | Stop reason: CLARIF

## 2019-12-09 RX ORDER — TRAMADOL HYDROCHLORIDE 50 MG/1
50 TABLET ORAL EVERY 4 HOURS PRN
Status: DISCONTINUED | OUTPATIENT
Start: 2019-12-09 | End: 2019-12-09 | Stop reason: HOSPADM

## 2019-12-09 RX ORDER — ONDANSETRON 4 MG/1
8 TABLET, ORALLY DISINTEGRATING ORAL EVERY 8 HOURS PRN
Status: DISCONTINUED | OUTPATIENT
Start: 2019-12-09 | End: 2019-12-09 | Stop reason: HOSPADM

## 2019-12-09 RX ORDER — SODIUM CHLORIDE 0.9 G/100ML
IRRIGANT IRRIGATION
Status: DISCONTINUED | OUTPATIENT
Start: 2019-12-09 | End: 2019-12-09 | Stop reason: HOSPADM

## 2019-12-09 RX ORDER — FENTANYL CITRATE 50 UG/ML
INJECTION, SOLUTION INTRAMUSCULAR; INTRAVENOUS
Status: DISCONTINUED | OUTPATIENT
Start: 2019-12-09 | End: 2019-12-09

## 2019-12-09 RX ORDER — HYDROCODONE BITARTRATE AND ACETAMINOPHEN 5; 325 MG/1; MG/1
1 TABLET ORAL EVERY 4 HOURS PRN
Status: DISCONTINUED | OUTPATIENT
Start: 2019-12-09 | End: 2019-12-09 | Stop reason: HOSPADM

## 2019-12-09 RX ORDER — MIDAZOLAM HYDROCHLORIDE 1 MG/ML
INJECTION INTRAMUSCULAR; INTRAVENOUS
Status: DISCONTINUED | OUTPATIENT
Start: 2019-12-09 | End: 2019-12-09

## 2019-12-09 RX ORDER — DIPHENHYDRAMINE HYDROCHLORIDE 50 MG/ML
12.5 INJECTION INTRAMUSCULAR; INTRAVENOUS
Status: DISCONTINUED | OUTPATIENT
Start: 2019-12-09 | End: 2019-12-09 | Stop reason: HOSPADM

## 2019-12-09 RX ORDER — AMOXICILLIN AND CLAVULANATE POTASSIUM 875; 125 MG/1; MG/1
1 TABLET, FILM COATED ORAL EVERY 12 HOURS
Qty: 14 TABLET | Refills: 0 | Status: SHIPPED | OUTPATIENT
Start: 2019-12-09 | End: 2019-12-16

## 2019-12-09 RX ORDER — NICOTINE 7MG/24HR
1 PATCH, TRANSDERMAL 24 HOURS TRANSDERMAL DAILY
Qty: 14 PATCH | Refills: 3 | Status: SHIPPED | OUTPATIENT
Start: 2019-12-09 | End: 2020-02-07 | Stop reason: CLARIF

## 2019-12-09 RX ORDER — MEPERIDINE HYDROCHLORIDE 50 MG/ML
12.5 INJECTION INTRAMUSCULAR; INTRAVENOUS; SUBCUTANEOUS EVERY 10 MIN PRN
Status: DISCONTINUED | OUTPATIENT
Start: 2019-12-09 | End: 2019-12-09 | Stop reason: HOSPADM

## 2019-12-09 RX ORDER — PROPOFOL 10 MG/ML
VIAL (ML) INTRAVENOUS
Status: DISCONTINUED | OUTPATIENT
Start: 2019-12-09 | End: 2019-12-09

## 2019-12-09 RX ORDER — SODIUM CHLORIDE, SODIUM LACTATE, POTASSIUM CHLORIDE, CALCIUM CHLORIDE 600; 310; 30; 20 MG/100ML; MG/100ML; MG/100ML; MG/100ML
INJECTION, SOLUTION INTRAVENOUS CONTINUOUS PRN
Status: DISCONTINUED | OUTPATIENT
Start: 2019-12-09 | End: 2019-12-09

## 2019-12-09 RX ORDER — GLYCOPYRROLATE 0.2 MG/ML
INJECTION INTRAMUSCULAR; INTRAVENOUS
Status: DISCONTINUED | OUTPATIENT
Start: 2019-12-09 | End: 2019-12-09

## 2019-12-09 RX ORDER — PROMETHAZINE HYDROCHLORIDE 25 MG/1
25 TABLET ORAL EVERY 6 HOURS PRN
Status: DISCONTINUED | OUTPATIENT
Start: 2019-12-09 | End: 2019-12-09 | Stop reason: HOSPADM

## 2019-12-09 RX ORDER — ONDANSETRON HYDROCHLORIDE 2 MG/ML
INJECTION, SOLUTION INTRAMUSCULAR; INTRAVENOUS
Status: DISCONTINUED | OUTPATIENT
Start: 2019-12-09 | End: 2019-12-09

## 2019-12-09 RX ORDER — HYDROMORPHONE HYDROCHLORIDE 1 MG/ML
0.2 INJECTION, SOLUTION INTRAMUSCULAR; INTRAVENOUS; SUBCUTANEOUS
Status: DISCONTINUED | OUTPATIENT
Start: 2019-12-09 | End: 2019-12-09 | Stop reason: HOSPADM

## 2019-12-09 RX ORDER — OXYCODONE HYDROCHLORIDE 5 MG/1
5 TABLET ORAL
Status: DISCONTINUED | OUTPATIENT
Start: 2019-12-09 | End: 2019-12-09 | Stop reason: HOSPADM

## 2019-12-09 RX ORDER — FAMOTIDINE 10 MG/ML
INJECTION INTRAVENOUS
Status: DISCONTINUED | OUTPATIENT
Start: 2019-12-09 | End: 2019-12-09

## 2019-12-09 RX ORDER — ESMOLOL HYDROCHLORIDE 10 MG/ML
INJECTION INTRAVENOUS
Status: DISCONTINUED | OUTPATIENT
Start: 2019-12-09 | End: 2019-12-09

## 2019-12-09 RX ADMIN — SODIUM CHLORIDE, SODIUM LACTATE, POTASSIUM CHLORIDE, AND CALCIUM CHLORIDE: .6; .31; .03; .02 INJECTION, SOLUTION INTRAVENOUS at 11:12

## 2019-12-09 RX ADMIN — NEOSTIGMINE METHYLSULFATE 3 MG: 1 INJECTION INTRAVENOUS at 12:12

## 2019-12-09 RX ADMIN — ESMOLOL HYDROCHLORIDE 30 MG: 10 INJECTION, SOLUTION INTRAVENOUS at 11:12

## 2019-12-09 RX ADMIN — HYDROMORPHONE HYDROCHLORIDE 0.2 MG: 1 INJECTION, SOLUTION INTRAMUSCULAR; INTRAVENOUS; SUBCUTANEOUS at 12:12

## 2019-12-09 RX ADMIN — PROPOFOL 200 MG: 10 INJECTION, EMULSION INTRAVENOUS at 11:12

## 2019-12-09 RX ADMIN — PIPERACILLIN AND TAZOBACTAM 4.5 G: 4; .5 INJECTION, POWDER, LYOPHILIZED, FOR SOLUTION INTRAVENOUS; PARENTERAL at 05:12

## 2019-12-09 RX ADMIN — ESMOLOL HYDROCHLORIDE 40 MG: 10 INJECTION, SOLUTION INTRAVENOUS at 12:12

## 2019-12-09 RX ADMIN — ONDANSETRON 4 MG: 2 INJECTION, SOLUTION INTRAMUSCULAR; INTRAVENOUS at 11:12

## 2019-12-09 RX ADMIN — OXYCODONE HYDROCHLORIDE 5 MG: 5 TABLET ORAL at 12:12

## 2019-12-09 RX ADMIN — GLYCOPYRROLATE 0.6 MG: 0.2 INJECTION INTRAMUSCULAR; INTRAVENOUS at 12:12

## 2019-12-09 RX ADMIN — FENTANYL CITRATE 100 MCG: 50 INJECTION, SOLUTION INTRAMUSCULAR; INTRAVENOUS at 11:12

## 2019-12-09 RX ADMIN — ROCURONIUM BROMIDE 10 MG: 10 INJECTION, SOLUTION INTRAVENOUS at 11:12

## 2019-12-09 RX ADMIN — MIDAZOLAM 2 MG: 1 INJECTION INTRAMUSCULAR; INTRAVENOUS at 11:12

## 2019-12-09 RX ADMIN — FAMOTIDINE 20 MG: 10 INJECTION, SOLUTION INTRAVENOUS at 11:12

## 2019-12-09 RX ADMIN — SUCCINYLCHOLINE CHLORIDE 160 MG: 20 INJECTION, SOLUTION INTRAMUSCULAR; INTRAVENOUS at 11:12

## 2019-12-09 NOTE — PLAN OF CARE
Problem: Adult Inpatient Plan of Care  Goal: Absence of Hospital-Acquired Illness or Injury  Outcome: Ongoing, Progressing  Goal: Optimal Comfort and Wellbeing  Outcome: Ongoing, Progressing     Problem: Fall Injury Risk  Goal: Absence of Fall and Fall-Related Injury  Outcome: Ongoing, Progressing

## 2019-12-09 NOTE — PLAN OF CARE
12/09/19 1530   Final Note   Assessment Type Final Discharge Note   Anticipated Discharge Disposition Home     Pt is being d/c with referral to wound clinic. PAMELA called Oriana from [employer] ([phone]) to inform of order and make sure pt is on her radar. Oriana will call this SW if anything is needed.

## 2019-12-09 NOTE — TRANSFER OF CARE
"Anesthesia Transfer of Care Note    Patient: Flako Quintana    Procedure(s) Performed: Procedure(s) (LRB):  DEBRIDEMENT, FOOT (Bilateral)    Patient location: PACU    Anesthesia Type: general    Transport from OR: Transported from OR on room air with adequate spontaneous ventilation    Post pain: adequate analgesia    Post assessment: no apparent anesthetic complications    Post vital signs: stable    Level of consciousness: awake and alert    Nausea/Vomiting: no nausea/vomiting    Complications: none    Transfer of care protocol was followed      Last vitals:   Visit Vitals  /76   Pulse 79   Temp 37.1 °C (98.8 °F) (Oral)   Resp 18   Ht 6' 1" (1.854 m)   Wt 78.4 kg (172 lb 14.2 oz)   SpO2 100%   BMI 22.81 kg/m²     "

## 2019-12-09 NOTE — PLAN OF CARE
12/09/19 1013   Patient Assessment/Suction   Level of Consciousness (AVPU) alert   Respiratory Effort Normal;Unlabored   Expansion/Accessory Muscles/Retractions no use of accessory muscles   PRE-TX-O2   O2 Device (Oxygen Therapy) room air   SpO2 100 %   Pulse Oximetry Type Intermittent   $ Pulse Oximetry - Multiple Charge Pulse Oximetry - Multiple   Pulse 79   Resp 18

## 2019-12-09 NOTE — PROGRESS NOTES
"[facility] access hospital Medicine  Progress Note    Patient Name: Flako Quintana  MRN: [id number]  Patient Class: IP- Inpatient   Admission Date: 12/5/2019  Length of Stay: 2 days  Attending Physician: Анна Bhatt MD  Primary Care Provider: Leopoldo Lim MD    Subjective:     Principal Problem:Diabetic foot ulcer    HPI:  The patient is a 38 years old male with history of IDDM and cirrhosis.  He presented to the emergency department with nonhealing wounds on his right foot.  He states that he attempted to shave off calluses on the bottom of his right foot about 2 months ago.  He has been putting Neosporin ointment on the sites.  He reports that the wounds have not been healing.  He also noted some brown drainage and foul order recently.  He also reports intermittent moderate to severe pain to his right foot. He denies trauma/injury to the left little toe.     He denies fevers or chills.  He denies shortness of breath, chest pain, abdominal pain, urinary symptoms.  He reports cutting down smoking to 1/3 pack per day.  He reports drinking a couple glasses of wine every few days.    Overview/Hospital Course:    12/7: pt reported burning pain in feet that resolved with gabapentin.  No other pain.  No subjective fever or chills.    12/8:  No pain in feet or otherwise.  No SOB or CP.  No subjective fever or chills.      Review of Systems     All systems reviewed and are negative except as noted per above.    Objective:      /83   Pulse 81   Temp 98.8 °F (37.1 °C) (Oral)   Resp 18   Ht 6' 1" (1.854 m)   Wt 78.4 kg (172 lb 14.2 oz)   SpO2 100%   BMI 22.81 kg/m²     Physical Exam  Gen: alert, responsive  HEENT:  Eyes - no pallor  External ears with no lesions  Nares patent  Mouth - lips chapped  CV: RRR  Lungs: nl effort  Abd: ND  Ext: no edema; feet dressed, clean and dry  Skin: warm, dry; changes as noted above  Neuro: grossly intact  Psych: pleasant    All labs, images, and other studies " reviewed personally by me.    Assessment/Plan:      * Diabetic foot ulcer  With dry gangrene on right big toe and dry gangrene on left little toe  IV Zosyn and Vancomycin - pt to be discharged on Augmentin per ID recs  ID, podiatry, vascular, and wound care following, thank you  Surgery Monday  Lower extremity dopplers: no significant stenosis   MRI negative for osteomyelitis     Thrombocytopenia 2/2 liver cirrhosis 2/2 alcohol abuse  Continue home medication  Monitoring  DVT ppx with lovenox held    Type 2 diabetes mellitus with hyperglycemia, with long-term current use of insulin  Poor control  Hemoglobin A1c 8.5 about 3 months ago  Continue basal insulin  Low dose sliding scale insulin    Cigarette nicotine dependence without complication  Encouraged smoking cessation    VTE Risk Mitigation (From admission, onward)         Ordered     IP VTE LOW RISK PATIENT  Once      12/06/19 0246     Place sequential compression device  Until discontinued      12/06/19 0246                Анна Bhatt MD  Department of Hospital Medicine   [facility]

## 2019-12-09 NOTE — ANESTHESIA POSTPROCEDURE EVALUATION
Anesthesia Post Evaluation    Patient: Flako Quintana    Procedure(s) Performed: Procedure(s) (LRB):  DEBRIDEMENT, FOOT (Bilateral)    Final Anesthesia Type: general    Patient location during evaluation: PACU  Patient participation: Yes- Able to Participate  Level of consciousness: awake and alert, oriented and awake  Post-procedure vital signs: reviewed and stable  Pain management: adequate  Airway patency: patent    PONV status at discharge: No PONV  Anesthetic complications: no      Cardiovascular status: blood pressure returned to baseline, hemodynamically stable and stable  Respiratory status: unassisted, spontaneous ventilation and room air  Hydration status: euvolemic  Follow-up not needed.          Vitals Value Taken Time   /65 12/9/2019 12:45 PM   Temp 36.2 °C (97.2 °F) 12/9/2019 12:30 PM   Pulse 83 12/9/2019 12:46 PM   Resp 21 12/9/2019 12:46 PM   SpO2 100 % 12/9/2019 12:46 PM   Vitals shown include unvalidated device data.      No case tracking events are documented in the log.      Pain/Dakota Score: Pain Rating Prior to Med Admin: 8 (12/9/2019 12:38 PM)  Dakota Score: 10 (12/9/2019 12:30 PM)

## 2019-12-09 NOTE — DISCHARGE SUMMARY
[facility] Medicine  Discharge Summary       Patient Name: Flako Quintana  MRN: [id number]  Admission Date: 12/5/2019   Discharge Date and Time: 12/9/2019  4:06 PM  Attending Physician: Анна Bhatt MD   Discharging Provider: Анна Bhatt MD  Primary Care Provider: Leoplodo Lim MD      HPI:   The patient is a 38 years old male with history of IDDM and cirrhosis.  He presented to the emergency department with nonhealing wounds on his right foot.  He states that he attempted to shave off calluses on the bottom of his right foot about 2 months ago.  He has been putting Neosporin ointment on the sites.  He reports that the wounds have not been healing.  He also noted some brown drainage and foul order recently.  He also reports intermittent moderate to severe pain to his right foot. He denies trauma/injury to the left little toe.     He denies fevers or chills.  He denies shortness of breath, chest pain, abdominal pain, urinary symptoms.  He reports cutting down smoking to 1/3 pack per day.  He reports drinking a couple glasses of wine every few days.      Procedure(s) (LRB):  DEBRIDEMENT, FOOT (Bilateral)      Hospital Course:      Pt was admitted for diabetic foot ulcers with dry gangrene.  Pt was treated with vanc and zosyn.  ID, vascular, and podiatry/surgery were consulted.  Arterial US unremarkable; no vascular intervention was required.  Pt received surgical debridement, and was considered stable for discharge the same day.  He was discharged on Augmentin per ID recs.  MRI negative for osteomyelitis.  Pt also noted to have thrombocytopenia 2/2 liver cirrhosis 2/2 EtOH abuse.  Pt without withdrawal symptoms during stay.  Pt also with nicotine dependence and tobacco use; pt discharged with nicotine patches.  Pt to follow up with PCP and wound care / podiatry / surgery at discharge.      DISCHARGE PHYSICAL EXAM  /70 (BP Location: Left arm, Patient Position: Sitting)   Pulse  "84   Temp 97.8 °F (36.6 °C) (Oral)   Resp 16   Ht 6' 1" (1.854 m)   Wt 78.4 kg (172 lb 14.2 oz)   SpO2 99%   BMI 22.81 kg/m²   Gen: alert, responsive  HEENT:  Eyes - no pallor  External ears with no lesions  Nares patent  Mouth - lips chapped  CV: RRR  Lungs: CTA B/L  Abd: +BS, soft, NT, ND  Ext: feet dressed, clean and dry, with surgical shoes  Skin: warm, dry  Neuro: grossly intact  Psych: pleasant    Consults:   Consults (From admission, onward)        Status Ordering Provider     Inpatient consult to Infectious Diseases  Once     Provider:  Emma So MD    Completed KIESHA SALAZAR     Inpatient consult to Vascular Surgery  Once     Provider:  Job Pike MD    Completed YESENIA BUCK        Final Active Diagnoses:    Diagnosis Date Noted POA    PRINCIPAL PROBLEM:  Diabetic foot ulcer [E11.621, L97.509] 12/06/2019 Yes    Type 2 diabetes mellitus with hyperglycemia, with long-term current use of insulin [E11.65, Z79.4] 08/28/2019 Not Applicable    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    Specialty:  Internal Medicine  Why:  for hospital discharge follow-up  Contact information:  [address] [facility] [address] [phone]             Yesenia Buck DPM. Call today.    Specialties:  Podiatry, Surgery, Wound Care  Why:  to confirm your follow-up appointment.  Contact information:  [address] [address] [phone]                 Patient Instructions:      Ambulatory consult to Wound Clinic "   Referral Priority: Routine Referral Type: Consultation   Referral Reason: Specialty Services Required   Referred to Provider: NICOLE WESTBROOK Requested Specialty: Wound Care   Number of Visits Requested: 1       Pending Diagnostic Studies:     None         Medications:  Reconciled Home Medications:      Medication List      START taking these medications    amoxicillin-clavulanate 875-125mg 875-125 mg per tablet  Commonly known as:  AUGMENTIN  Take 1 tablet by mouth every 12 (twelve) hours. for 7 days     ibuprofen 800 MG tablet  Commonly known as:  ADVIL,MOTRIN  Take 1 tablet (800 mg total) by mouth every 6 (six) hours as needed for Pain.     nicotine 7 mg/24 hr  Commonly known as:  NICODERM CQ  Place 1 patch onto the skin once daily.     oxyCODONE-acetaminophen 5-325 mg per tablet  Commonly known as:  PERCOCET  Take 1 tablet by mouth every 4 (four) hours as needed for Pain.  Replaces:  oxyCODONE-acetaminophen  mg per tablet        CONTINUE taking these medications    Basaglar KwikPen U-100 Insulin glargine 100 units/mL (3mL) SubQ pen  Generic drug:  insulin  Inject 23 Units into the skin every evening.     cyclobenzaprine 7.5 MG Tab  Commonly known as:  FEXMID  Take 10 mg by mouth 3 (three) times daily as needed.     folic acid 1 MG tablet  Commonly known as:  FOLVITE  Take 1 mg by mouth once daily.     gabapentin 600 MG tablet  Commonly known as:  NEURONTIN  Take 600 mg by mouth nightly.     MEN'S MULTI-VITAMIN ORAL  Take 1 tablet by mouth once daily.     traMADol 50 mg tablet  Commonly known as:  ULTRAM  Take 50 mg by mouth every 12 (twelve) hours as needed for Pain.     Vitamin B-1 100 MG tablet  Generic drug:  thiamine  Take 100 mg by mouth once daily.        STOP taking these medications    oxyCODONE-acetaminophen  mg per tablet  Commonly known as:  PERCOCET  Replaced by:  oxyCODONE-acetaminophen 5-325 mg per tablet          Анна Bhatt MD  Department of Hospital Medicine  [facility]  [facility]

## 2019-12-09 NOTE — OP NOTE
Operative Report     Patient name: Flako Quintana   MRN: [id number]  Date of surgery: 12/9/2019    Surgeon: Yesenia Buck DPM   Assistant: none    Preoperative diagnosis:  1.  Grade 3 ulceration plantar 1st metatarsal head of right foot 2.  Gangrenous changes of left 5th digit.  Postoperative diagnosis:  Same as above  Procedure:  1.  Excisional debridement to bone of ulceration plantar 1st metatarsal head of right foot. 2.  Debridement of blood blister from left 5th digit.  Anesthesia:  General   Hemostasis: none  Estimated blood loss:  5 cc   Specimen:  None  Complications: None  Condition upon discharge: Stable    Procedure in detail:  The patient was brought to the operating room and placed on the operating table in the supine position. Following adequate IV sedation, the right and left foot were then prepped, scrubbed, and draped in the normal aseptic manner.  A time-out was then called.  At this time attention was directed to the plantar aspect of the right foot where there was noted to be a full-thickness ulceration with necrotic and fibrotic tissue in the base.  This ulceration probed to bone.  Utilizing a 15.  Blade, a large amount of necrotic and fibrotic tissue was debrided from the base of the wound margins.  No abscess or drainage was seen in the area.  Adequate bleeding was noted around the wound margins following debridement.  The wound measured approximately 3 cm by 4 cm x 1 cm deep.  Next, the wound was flushed with copious amounts of sterile saline.  At this time a 2 cm x 4 cm skin graft, puraply, was placed in the base of the wound.  Next, the wound was dressed with Adaptic, 4x4s, Jerome, and an Ace wrap.  Next, attention was directed to the left foot where the left 5th digit appeared gangrenous.  Utilizing a 15.  Blade and pickups, the tissue was debrided.  A blood blister was underneath the gangrenous appearing tissue.  Underlying, was a clean base of subcutaneous tissue with no signs of gangrene  tissue or infection.  The left foot was then wrapped with Adaptic, 4x4s, Jerome, and an Ace wrap.  The patient tolerated the procedure well. Anesthesia was reversed and he left the operating room with vital signs stable and vascular status intact to all digits of the right and left foot.      1. Keep dressings, clean, dry, and intact to surgical extremity.  2. Rest, ice, and elevate the surgical extremity.  3.  Patient to weight bear in surgical boot on right foot, and surgical shoe on left foot.   4. Take all medication as discussed at discharge.  5. Contact the clinic with any postoperative concerns or complications.  6.  I will follow up with patient until discharged.  Once discharged, patient to follow up with me in the outpatient [facility].

## 2019-12-11 LAB
BACTERIA BLD CULT: NORMAL
BACTERIA BLD CULT: NORMAL

## 2019-12-17 ENCOUNTER — OFFICE VISIT (OUTPATIENT)
Dept: WOUND CARE | Facility: HOSPITAL | Age: 39
End: 2019-12-17
Attending: PODIATRIST
Payer: MEDICAID

## 2019-12-17 VITALS
HEART RATE: 90 BPM | DIASTOLIC BLOOD PRESSURE: 85 MMHG | TEMPERATURE: 98 F | RESPIRATION RATE: 16 BRPM | SYSTOLIC BLOOD PRESSURE: 122 MMHG

## 2019-12-17 DIAGNOSIS — L97.512 NON-PRESSURE CHRONIC ULCER OF OTHER PART OF RIGHT FOOT WITH FAT LAYER EXPOSED: Primary | ICD-10-CM

## 2019-12-17 PROCEDURE — 99213 PR OFFICE/OUTPT VISIT, EST, LEVL III, 20-29 MIN: ICD-10-PCS | Mod: 25,,, | Performed by: PODIATRIST

## 2019-12-17 PROCEDURE — 99213 OFFICE O/P EST LOW 20 MIN: CPT | Mod: 25 | Performed by: PODIATRIST

## 2019-12-17 PROCEDURE — 99213 OFFICE O/P EST LOW 20 MIN: CPT | Mod: 25,,, | Performed by: PODIATRIST

## 2019-12-17 PROCEDURE — 11043 DBRDMT MUSC&/FSCA 1ST 20/<: CPT | Mod: ,,, | Performed by: PODIATRIST

## 2019-12-17 PROCEDURE — 11043 PR DEBRIDEMENT, SKIN, SUB-Q TISSUE,MUSCLE,=<20 SQ CM: ICD-10-PCS | Mod: ,,, | Performed by: PODIATRIST

## 2019-12-17 PROCEDURE — 11043 DBRDMT MUSC&/FSCA 1ST 20/<: CPT | Performed by: PODIATRIST

## 2019-12-17 RX ORDER — DOXYCYCLINE 100 MG/1
100 CAPSULE ORAL 2 TIMES DAILY
Qty: 28 CAPSULE | Refills: 0 | Status: SHIPPED | OUTPATIENT
Start: 2019-12-17 | End: 2020-01-07 | Stop reason: SDUPTHER

## 2019-12-17 NOTE — PROGRESS NOTES
[address] [facility] [address]  [place] [address]  Phone: [phone]   [phone]    Patient's PCP:Leopoldo Lim MD  Referring Provider: No ref. provider found    Subjective:      Chief Complaint:: Wound Care    HPI  Flako Quintana is a   38 y.o. male with diabetes, alcoholism, cirrhosis, and smoker who presented to the emergency room 12/6 for bilateral foot wounds.  He states he had the right full thickness ulceration for approximately 2 months. He stated the wound began from attempting to shave off a callus with a razor.  He stated that the wound began to get worse and drain a foul-smelling odor.  At which time he presented to the ER.  When he presented, the wound had surrounding erythema and edema.      Additionally, when he presented to the ER he also had gangrene at the tip of his right great toe and left 5th toe with blood blisters on the left 3rd and 4th tips, in addition to pre ulcerative calluses located sub 3rd metatarsal heads bilaterally.    Once in the hospital, I was consulted and performed an OR debridement consisting of    1.  Excisional debridement to bone of ulceration plantar 1st metatarsal head of right foot. 2.  Debridement of blood blister from left 5th digit.    Patient missed his 1st follow-up visit, and has been non compliant since discharge with wearing his surgical shoe as instructed.  Patient also has not been changing the dressing as instructed.  Additionally, patient has also been getting his foot wet with showering.     Past Surgical History:   Procedure Laterality Date    DEBRIDEMENT OF FOOT Bilateral 12/9/2019    Procedure: DEBRIDEMENT, FOOT;  Surgeon: Yesenia Buck DPM;  Location: [facility] OR;  Service: Podiatry;  Laterality: Bilateral;     Past Medical History:   Diagnosis Date    Diabetes mellitus      Family History   Problem Relation Age of Onset    Diabetes Mother     No Known Problems Father         Social History:   Marital Status: Single  Alcohol History:   reports that he drinks about 28.0 standard drinks of alcohol per week.  Tobacco History:  reports that he has been smoking vaping w/o nicotine. He has been smoking about 0.50 packs per day. He has never used smokeless tobacco.  Drug History:  reports that he has current or past drug history.    Review of patient's allergies indicates:  No Known Allergies    Current Outpatient Medications   Medication Sig Dispense Refill    cyclobenzaprine (FEXMID) 7.5 MG Tab Take 10 mg by mouth 3 (three) times daily as needed.      folic acid (FOLVITE) 1 MG tablet Take 1 mg by mouth once daily.      gabapentin (NEURONTIN) 600 MG tablet Take 600 mg by mouth nightly.      ibuprofen (ADVIL,MOTRIN) 800 MG tablet Take 1 tablet (800 mg total) by mouth every 6 (six) hours as needed for Pain. 15 tablet 0    insulin (BASAGLAR KWIKPEN U-100 INSULIN) glargine 100 units/mL (3mL) SubQ pen Inject 23 Units into the skin every evening.      MEN'S MULTI-VITAMIN ORAL Take 1 tablet by mouth once daily.      oxyCODONE-acetaminophen (PERCOCET) 5-325 mg per tablet Take 1 tablet by mouth every 4 (four) hours as needed for Pain. 15 tablet 0    thiamine (VITAMIN B-1) 100 MG tablet Take 100 mg by mouth once daily.      traMADol (ULTRAM) 50 mg tablet Take 50 mg by mouth every 12 (twelve) hours as needed for Pain.      doxycycline (MONODOX) 100 MG capsule Take 1 capsule (100 mg total) by mouth 2 (two) times daily. 28 capsule 0    nicotine (NICODERM CQ) 7 mg/24 hr Place 1 patch onto the skin once daily. (Patient not taking: Reported on 12/17/2019) 14 patch 3     No current facility-administered medications for this visit.        Review of Systems      Objective:        Physical Exam:   Foot Exam  Physical Exam      Right Foot/Ankle      Neurovascular  Dorsalis pedis: 2+  Posterior tibial: 1+  Saphenous nerve sensation: diminished  Tibial nerve sensation: diminished  Superficial peroneal nerve sensation: diminished  Deep peroneal nerve sensation:  diminished  Sural nerve sensation: diminished        Left Foot/Ankle       Neurovascular  Dorsalis pedis: 2+  Posterior tibial: 1+  Saphenous nerve sensation: diminished  Tibial nerve sensation: diminished  Superficial peroneal nerve sensation: diminished  Deep peroneal nerve sensation: diminished  Sural nerve sensation: diminished        Much of the documentation for this visit was completed in the Wound Docs system.  Please see the attached documentation for further details about the patient's care.    Yesenia Buck DPM   Imaging:            Assessment:       1. Non-pressure chronic ulcer of other part of right foot with fat layer exposed      Plan:   Non-pressure chronic ulcer of other part of right foot with fat layer exposed  -     doxycycline (MONODOX) 100 MG capsule; Take 1 capsule (100 mg total) by mouth 2 (two) times daily.  Dispense: 28 capsule; Refill: 0      Follow up in about 2 weeks (around 12/31/2019) for Wound Care.    Procedures - Much of the documentation for this visit was completed in the Wound Docs system.  Please see the attached documentation for further details about the patient's care.    Yesenia Buck DPM     Counseling:      I provided patient education verbally regarding:   Patient diagnosis, treatment options, as well as alternatives, risks, and benefits.     This note was created using Dragon voice recognition software that occasionally misinterpreted phrases or words.       Much of the documentation for this visit was completed in the Wound Docs system.  Please see the attached documentation for further details about the patient's care.    Yesenia Buck DPM     Patient to wear his surgery shoe at all times and not get his foot wet.  Patient to followup Friday and bring his cam walker boot at that time.   Patient to use Regranex and Promogran Ag.

## 2020-01-07 ENCOUNTER — OFFICE VISIT (OUTPATIENT)
Dept: WOUND CARE | Facility: HOSPITAL | Age: 40
End: 2020-01-07
Attending: PODIATRIST
Payer: MEDICAID

## 2020-01-07 VITALS
DIASTOLIC BLOOD PRESSURE: 72 MMHG | RESPIRATION RATE: 16 BRPM | TEMPERATURE: 98 F | HEART RATE: 68 BPM | SYSTOLIC BLOOD PRESSURE: 130 MMHG

## 2020-01-07 DIAGNOSIS — L97.512 NON-PRESSURE CHRONIC ULCER OF OTHER PART OF RIGHT FOOT WITH FAT LAYER EXPOSED: Primary | ICD-10-CM

## 2020-01-07 PROCEDURE — 11042 DBRDMT SUBQ TIS 1ST 20SQCM/<: CPT | Mod: ,,, | Performed by: PODIATRIST

## 2020-01-07 PROCEDURE — 11042 DBRDMT SUBQ TIS 1ST 20SQCM/<: CPT | Performed by: PODIATRIST

## 2020-01-07 PROCEDURE — 11042 PR DEBRIDEMENT, SKIN, SUB-Q TISSUE,=<20 SQ CM: ICD-10-PCS | Mod: ,,, | Performed by: PODIATRIST

## 2020-01-07 RX ORDER — DOXYCYCLINE 100 MG/1
100 CAPSULE ORAL 2 TIMES DAILY
Qty: 28 CAPSULE | Refills: 0 | Status: SHIPPED | OUTPATIENT
Start: 2020-01-07 | End: 2020-02-07 | Stop reason: CLARIF

## 2020-01-07 NOTE — PROGRESS NOTES
[address]  [place] [address]  Phone: [phone]   [phone]    Patient's PCP:Leopoldo Lim MD  Referring Provider: No ref. provider found    Subjective:      Chief Complaint:: Wound Care    HPI  Flako Quintana is a   38 y.o. male with diabetes, alcoholism, cirrhosis, and smoker.     Today, patient is here for his follow up visit of his right foot wound. He has been non compliant and has missed several of his appointments.     He also is not wearing his camwalker boot as instructed. He is in flip flops. Patient also has not been changing the dressing as instructed.  Additionally, patient has also been getting his foot wet with showering      Vitals:    01/07/20 1121   BP: 130/72   Pulse: 68   Resp: 16   Temp: 97.9 °F (36.6 °C)     Shoe Size:     Past Surgical History:   Procedure Laterality Date    DEBRIDEMENT OF FOOT Bilateral 12/9/2019    Procedure: DEBRIDEMENT, FOOT;  Surgeon: Yesenia Buck DPM;  Location: [facility];  Service: Podiatry;  Laterality: Bilateral;     Past Medical History:   Diagnosis Date    Diabetes mellitus      Family History   Problem Relation Age of Onset    Diabetes Mother     No Known Problems Father         Social History:   Marital Status: Single  Alcohol History:  reports that he drinks about 28.0 standard drinks of alcohol per week.  Tobacco History:  reports that he has been smoking vaping w/o nicotine. He has been smoking about 0.50 packs per day. He has never used smokeless tobacco.  Drug History:  reports that he has current or past drug history.    Review of patient's allergies indicates:  No Known Allergies    Current Outpatient Medications   Medication Sig Dispense Refill    cyclobenzaprine (FEXMID) 7.5 MG Tab Take 10 mg by mouth 3 (three) times daily as needed.      doxycycline (MONODOX) 100 MG capsule Take 1 capsule (100 mg total) by mouth 2 (two) times daily. 28 capsule 0    folic acid (FOLVITE) 1 MG tablet Take 1 mg by mouth once daily.       gabapentin (NEURONTIN) 600 MG tablet Take 600 mg by mouth nightly.      ibuprofen (ADVIL,MOTRIN) 800 MG tablet Take 1 tablet (800 mg total) by mouth every 6 (six) hours as needed for Pain. 15 tablet 0    insulin (BASAGLAR KWIKPEN U-100 INSULIN) glargine 100 units/mL (3mL) SubQ pen Inject 23 Units into the skin every evening.      MEN'S MULTI-VITAMIN ORAL Take 1 tablet by mouth once daily.      nicotine (NICODERM CQ) 7 mg/24 hr Place 1 patch onto the skin once daily. 14 patch 3    oxyCODONE-acetaminophen (PERCOCET) 5-325 mg per tablet Take 1 tablet by mouth every 4 (four) hours as needed for Pain. 15 tablet 0    thiamine (VITAMIN B-1) 100 MG tablet Take 100 mg by mouth once daily.      traMADol (ULTRAM) 50 mg tablet Take 50 mg by mouth every 12 (twelve) hours as needed for Pain.       No current facility-administered medications for this visit.        Review of Systems      Objective:        Physical Exam:   Foot Exam  Physical Exam   Musculoskeletal:        Feet:         Much of the documentation for this visit was completed in the Wound Docs system.  Please see the attached documentation for further details about the patient's care.    Yesenia Buck DPM   Imaging:            Assessment:       1. Non-pressure chronic ulcer of other part of right foot with fat layer exposed      Plan:   Non-pressure chronic ulcer of other part of right foot with fat layer exposed      Follow up in about 1 week (around 1/14/2020).    Procedures - Much of the documentation for this visit was completed in the Wound Docs system.  Please see the attached documentation for further details about the patient's care.    Yesenia Buck DPM     Counseling:     I provided patient education verbally regarding:   Patient diagnosis, treatment options, as well as alternatives, risks, and benefits.     This note was created using Dragon voice recognition software that occasionally misinterpreted phrases or words.     Much of the  documentation for this visit was completed in the Wound Docs system.  Please see the attached documentation for further details about the patient's care.    Yesenia Buck DPM

## 2020-02-07 PROBLEM — S62.613A CLOSED DISPLACED FRACTURE OF PROXIMAL PHALANX OF LEFT MIDDLE FINGER: Status: ACTIVE | Noted: 2020-02-07

## 2020-02-07 PROBLEM — R41.82 ALTERED MENTAL STATE: Status: ACTIVE | Noted: 2020-02-07

## 2020-02-07 PROBLEM — S62.643A CLOSED NONDISPLACED FRACTURE OF PROXIMAL PHALANX OF LEFT MIDDLE FINGER: Status: ACTIVE | Noted: 2020-02-07

## 2020-03-23 NOTE — CONSULTS
"[facility]  Neurology  Consult Note    Patient Name: Flako Quintana  MRN: [id number]  Admission Date: 9/5/2019  Hospital Length of Stay: 0 days  Code Status: Full Code   Attending Provider: Jermaine Viveros MD   Consulting Provider: Nicole Diego NP  Primary Care Physician: Leopoldo Lim MD  Principal Problem:Acute encephalopathy    Inpatient consult to neurology  Consult performed by: Nicole Diego NP  Consult ordered by: Katlyn Ward MD  Reason for consult: encephalopathy        Subjective:     Chief Complaint:  Dizziness with passing out episodes. Hypotension. Slurred speech    HPI:   Per Dr. Ward's HP:  38 year old AAM with PMH of IDDM, alcohol misuse disorder, liver cirrhosis who presented with altered mental status. His fiance called the ambulance because he kept "passing out". He takes percocet, librium, trazodone, ultram. He admits to drinking a 32 oz of "twisted tea" (malt liqour) yesterday in the afternoon after taking his medications in the morning. He cannot specifically say what he is on all the medications for, but says he has been taking them for 2 weeks.     Per Dr. Fallon (ED):  38-year-old male presented emergency department with syncopal episodes.  EMS at patient keeps passing out.  Patient on multiple medication for pain and to prevent alcohol withdrawals.  Patient was recently admitted to the hospital for alcohol-related problems.  Patient is awake but keeps falling asleep every few minutes.  Patient wakes up and answers all questions but keeps going back to sleep.  Patient has constricted pupils bilaterally.  Patient denies any pain.  Denies fever or chills or nausea or vomiting however he keeps falling asleep.    Pt seen sitting up on side of bed. He reports a single episode of passing out that occurred yesterday. No tongue biting or urinary incontinence. No h/o seizures. Denies HA, NV, CP, focal weakness except for RLE pain due to fracture 4 days PTA or " sensory changes.     Diagnostic Review:  Plt 134L  Na 132L  Cr 0.9  Bili 3.6H  AST 53H  NH3 59H  UDS + for benzos  UA suggestive of UTI    CTH 9/5:  There is no evidence of intracranial mass, hemorrhage, or midline shift.  Ventricles and sulci are within normal limits.  There are no pathologic extra-axial fluid collections.    There is no CT evidence of cortical ischemic change.  12 mm ill-defined hypoattenuating focus is noted centrally within the farnaz.  This is similar in appearance to a recent prior study from August 27, 2019, but not identified on a prior study from January 2018.  Etiology is undetermined.  Differential possibilities would include ischemic involvement and central pontine myelinolysis.  Follow-up MR should be considered.    EKG: NSR    Past Medical History:   Diagnosis Date    Diabetes mellitus        History reviewed. No pertinent surgical history.    Review of patient's allergies indicates:  No Known Allergies    Current Neurological Medications: reviewed    No current facility-administered medications on file prior to encounter.      Current Outpatient Medications on File Prior to Encounter   Medication Sig    gabapentin (NEURONTIN) 600 MG tablet Take 600 mg by mouth nightly.    insulin (BASAGLAR KWIKPEN U-100 INSULIN) glargine 100 units/mL (3mL) SubQ pen Inject 23 Units into the skin every evening.    oxyCODONE-acetaminophen (PERCOCET)  mg per tablet Take 1 tablet by mouth every 4 (four) hours as needed for Pain.    traMADol (ULTRAM) 50 mg tablet Take 50 mg by mouth every 12 (twelve) hours as needed for Pain.    cyclobenzaprine (FEXMID) 7.5 MG Tab Take 10 mg by mouth 3 (three) times daily as needed.    folic acid (FOLVITE) 1 MG tablet Take 1 mg by mouth once daily.    MEN'S MULTI-VITAMIN ORAL Take 1 tablet by mouth once daily.    thiamine (VITAMIN B-1) 100 MG tablet Take 100 mg by mouth once daily.      Family History     Problem Relation (Age of Onset)    Diabetes Mother    No  Known Problems Father        Tobacco Use    Smoking status: Current Every Day Smoker     Packs/day: 0.50     Types: Vaping w/o nicotine    Smokeless tobacco: Never Used   Substance and Sexual Activity    Alcohol use: Yes     Alcohol/week: 16.8 oz     Types: 28 Cans of beer per week    Drug use: Yes    Sexual activity: Not on file     Review of Systems   Constitutional: Negative for activity change, appetite change, chills and fever.   HENT: Negative for congestion, rhinorrhea, sneezing, sore throat and tinnitus.    Eyes: Negative for photophobia, discharge and visual disturbance.   Respiratory: Negative for cough, shortness of breath and wheezing.    Cardiovascular: Negative for chest pain, palpitations and leg swelling.   Gastrointestinal: Negative for abdominal distention, abdominal pain, constipation, diarrhea, nausea and vomiting.   Endocrine: Negative for cold intolerance and heat intolerance.   Genitourinary: Negative for dysuria, flank pain, frequency, hematuria and urgency.   Musculoskeletal: Positive for gait problem and joint swelling. Negative for arthralgias, neck pain and neck stiffness.   Skin: Negative for color change and rash.   Allergic/Immunologic: Negative for immunocompromised state.   Neurological: Positive for syncope. Negative for dizziness, tremors, seizures, facial asymmetry, speech difficulty, weakness, light-headedness, numbness and headaches.   Hematological: Does not bruise/bleed easily.   Psychiatric/Behavioral: Negative for behavioral problems and hallucinations.     Objective:     Vital Signs (Most Recent):  Temp: 97.9 °F (36.6 °C) (09/06/19 0809)  Pulse: 80 (09/06/19 0809)  Resp: 18 (09/06/19 0809)  BP: 111/73 (09/06/19 0809)  SpO2: 99 % (09/06/19 0809) Vital Signs (24h Range):  Temp:  [97.7 °F (36.5 °C)-98.7 °F (37.1 °C)] 97.9 °F (36.6 °C)  Pulse:  [54-85] 80  Resp:  [13-20] 18  SpO2:  [96 %-100 %] 99 %  BP: ()/(55-81) 111/73     Weight: 77.1 kg (169 lb 15.6 oz)  Body  mass index is 22.43 kg/m².    Physical Exam   Constitutional: He is oriented to person, place, and time. He appears well-developed and well-nourished.   HENT:   Head: Normocephalic and atraumatic.   Eyes: Pupils are equal, round, and reactive to light. Conjunctivae and EOM are normal.   Neck: Normal range of motion. Neck supple.   Pulmonary/Chest: Effort normal.   Abdominal: Soft. He exhibits no distension.   Musculoskeletal: He exhibits edema.   RLE   Neurological: He is alert and oriented to person, place, and time. He has a normal Finger-Nose-Finger Test.   Skin: Skin is warm and dry.   Psychiatric: His speech is normal and behavior is normal.       NEUROLOGICAL EXAMINATION:     MENTAL STATUS   Oriented to person, place, and time.   Attention: normal.   Speech: speech is normal   Level of consciousness: alert  Able to name object. Able to repeat.     CRANIAL NERVES   Cranial nerves II through XII intact.     CN III, IV, VI   Pupils are equal, round, and reactive to light.  Extraocular motions are normal.     MOTOR EXAM   Overall muscle tone: normal    Strength   Strength 5/5 except as noted.        Formal strength testing deferred in RLE due to recent injury     REFLEXES        Normal BUE DTRs     SENSORY EXAM   Light touch normal.     GAIT AND COORDINATION      Coordination   Finger to nose coordination: normal       Gait impaired due to RLE injury only, no ataxia       Significant Labs: All pertinent lab results from the past 24 hours have been reviewed.    Significant Imaging: I have reviewed and interpreted all pertinent imaging results/findings within the past 24 hours.    Assessment and Plan:   1.Encephalopathy  - Likely multifactorial -- polypharmacy, hyperammonemia, electrolyte imbalances & abnormal CTH as below  - Pt is actually much improved and seems to be back to baseline    2. Abnormal CTH w/12mm pontine hypodensity  - MRI brain today - f/u results  - Normal neuro exam    3. ETOH abuse  - Pt has been  placed on thiamine and folate supplements    4. Hyperammonemia  - In the setting of chronic liver disease secondary to excessive ETOH  - Mgmt per IM    5. DM2 with hyperglycemia  - Mgmt per IM    6. 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 I will follow-up with patient. Please contact us if you have any additional questions.    Nicole Diego NP  Neurology  [facility]   This is a 40.3 week infant born to a 36 YO  (SAB X2), Maternal blood type A+, Prenatal Labs: HBsAG negative, HIV negative, VDRL/RPR negative, Rubella immune, GBS negative, [id number]. Uneventful pregnancy, mother presented in labor with ROM 5 hours PTD, meconium stained fluid, Maternal temp PTD 39.1, received 1 dose of ampicillin/gentamycin <1 hour PTD.  Infant noted to have a category II tracing PTD, delivered , presented with a weak cry and decrease resp effort, tactile stim given and  Neopuff <1 min with a cry and an improvement in tone noted.  Apgars 7&9.  EOS = 3.01. Transferred to NICU for r/o sepsis.

## 2020-05-12 ENCOUNTER — HOSPITAL ENCOUNTER (EMERGENCY)
Facility: OTHER | Age: 40
Discharge: HOME | End: 2020-05-13
Attending: EMERGENCY MEDICINE
Payer: MEDICAID

## 2020-05-12 DIAGNOSIS — R73.9 HYPERGLYCEMIA: Primary | ICD-10-CM

## 2020-05-12 DIAGNOSIS — R55 SYNCOPE: ICD-10-CM

## 2020-05-12 DIAGNOSIS — F10.929 ALCOHOLIC INTOXICATION WITH COMPLICATION: ICD-10-CM

## 2020-05-12 LAB
ALBUMIN SERPL BCP-MCNC: 2.6 G/DL (ref 3.5–5.2)
ALP SERPL-CCNC: 276 U/L (ref 55–135)
ALT SERPL W/O P-5'-P-CCNC: 20 U/L (ref 10–44)
ANION GAP SERPL CALC-SCNC: 10 MMOL/L (ref 8–16)
AST SERPL-CCNC: 31 U/L (ref 10–40)
B-OH-BUTYR BLD STRIP-SCNC: 0.1 MMOL/L (ref 0–0.5)
BACTERIA #/AREA URNS HPF: NORMAL /HPF
BASOPHILS # BLD AUTO: 0.1 K/UL (ref 0–0.2)
BASOPHILS NFR BLD: 1.9 % (ref 0–1.9)
BILIRUB SERPL-MCNC: 1.2 MG/DL (ref 0.1–1)
BILIRUB UR QL STRIP: NEGATIVE
BUN SERPL-MCNC: 2 MG/DL (ref 6–20)
CALCIUM SERPL-MCNC: 7.6 MG/DL (ref 8.7–10.5)
CHLORIDE SERPL-SCNC: 111 MMOL/L (ref 95–110)
CLARITY UR: CLEAR
CO2 SERPL-SCNC: 24 MMOL/L (ref 23–29)
COLOR UR: YELLOW
CREAT SERPL-MCNC: 0.8 MG/DL (ref 0.5–1.4)
DIFFERENTIAL METHOD: ABNORMAL
EOSINOPHIL # BLD AUTO: 0.1 K/UL (ref 0–0.5)
EOSINOPHIL NFR BLD: 2.5 % (ref 0–8)
ERYTHROCYTE [DISTWIDTH] IN BLOOD BY AUTOMATED COUNT: 15.9 % (ref 11.5–14.5)
EST. GFR  (AFRICAN AMERICAN): >60 ML/MIN/1.73 M^2
EST. GFR  (NON AFRICAN AMERICAN): >60 ML/MIN/1.73 M^2
ETHANOL SERPL-MCNC: 220 MG/DL
GLUCOSE SERPL-MCNC: 182 MG/DL (ref 70–110)
GLUCOSE UR QL STRIP: ABNORMAL
HCT VFR BLD AUTO: 32.9 % (ref 40–54)
HGB BLD-MCNC: 10.5 G/DL (ref 14–18)
HGB UR QL STRIP: NEGATIVE
IMM GRANULOCYTES # BLD AUTO: 0.01 K/UL (ref 0–0.04)
IMM GRANULOCYTES NFR BLD AUTO: 0.2 % (ref 0–0.5)
KETONES UR QL STRIP: NEGATIVE
LEUKOCYTE ESTERASE UR QL STRIP: ABNORMAL
LYMPHOCYTES # BLD AUTO: 2.9 K/UL (ref 1–4.8)
LYMPHOCYTES NFR BLD: 55.6 % (ref 18–48)
MAGNESIUM SERPL-MCNC: 1.5 MG/DL (ref 1.6–2.6)
MCH RBC QN AUTO: 28.2 PG (ref 27–31)
MCHC RBC AUTO-ENTMCNC: 31.9 G/DL (ref 32–36)
MCV RBC AUTO: 88 FL (ref 82–98)
MICROSCOPIC COMMENT: NORMAL
MONOCYTES # BLD AUTO: 0.6 K/UL (ref 0.3–1)
MONOCYTES NFR BLD: 10.9 % (ref 4–15)
NEUTROPHILS # BLD AUTO: 1.5 K/UL (ref 1.8–7.7)
NEUTROPHILS NFR BLD: 28.9 % (ref 38–73)
NITRITE UR QL STRIP: NEGATIVE
NRBC BLD-RTO: 0 /100 WBC
PH UR STRIP: 7 [PH] (ref 5–8)
PLATELET # BLD AUTO: 165 K/UL (ref 150–350)
PMV BLD AUTO: 9.9 FL (ref 9.2–12.9)
POTASSIUM SERPL-SCNC: 3.6 MMOL/L (ref 3.5–5.1)
PROT SERPL-MCNC: 7 G/DL (ref 6–8.4)
PROT UR QL STRIP: NEGATIVE
RBC # BLD AUTO: 3.72 M/UL (ref 4.6–6.2)
RBC #/AREA URNS HPF: 1 /HPF (ref 0–4)
SODIUM SERPL-SCNC: 145 MMOL/L (ref 136–145)
SP GR UR STRIP: 1.01 (ref 1–1.03)
URN SPEC COLLECT METH UR: ABNORMAL
UROBILINOGEN UR STRIP-ACNC: NEGATIVE EU/DL
WBC # BLD AUTO: 5.23 K/UL (ref 3.9–12.7)
WBC #/AREA URNS HPF: 5 /HPF (ref 0–5)

## 2020-05-12 PROCEDURE — 80053 COMPREHEN METABOLIC PANEL: CPT

## 2020-05-12 PROCEDURE — 25000003 PHARM REV CODE 250: Performed by: EMERGENCY MEDICINE

## 2020-05-12 PROCEDURE — 80320 DRUG SCREEN QUANTALCOHOLS: CPT

## 2020-05-12 PROCEDURE — 93010 ELECTROCARDIOGRAM REPORT: CPT | Mod: ,,, | Performed by: INTERNAL MEDICINE

## 2020-05-12 PROCEDURE — 99284 EMERGENCY DEPT VISIT MOD MDM: CPT | Mod: 25

## 2020-05-12 PROCEDURE — 82010 KETONE BODYS QUAN: CPT

## 2020-05-12 PROCEDURE — 81000 URINALYSIS NONAUTO W/SCOPE: CPT | Mod: 59

## 2020-05-12 PROCEDURE — 96361 HYDRATE IV INFUSION ADD-ON: CPT

## 2020-05-12 PROCEDURE — 83735 ASSAY OF MAGNESIUM: CPT

## 2020-05-12 PROCEDURE — 93005 ELECTROCARDIOGRAM TRACING: CPT

## 2020-05-12 PROCEDURE — 93010 EKG 12-LEAD: ICD-10-PCS | Mod: ,,, | Performed by: INTERNAL MEDICINE

## 2020-05-12 PROCEDURE — 85025 COMPLETE CBC W/AUTO DIFF WBC: CPT

## 2020-05-12 RX ORDER — MAGNESIUM SULFATE 1 G/100ML
1 INJECTION INTRAVENOUS ONCE
Status: COMPLETED | OUTPATIENT
Start: 2020-05-13 | End: 2020-05-13

## 2020-05-12 RX ADMIN — MAGNESIUM SULFATE 1 G: 1 INJECTION INTRAVENOUS at 11:05

## 2020-05-12 RX ADMIN — SODIUM CHLORIDE 1000 ML: 0.9 INJECTION, SOLUTION INTRAVENOUS at 10:05

## 2020-05-13 VITALS
HEART RATE: 109 BPM | HEIGHT: 73 IN | BODY MASS INDEX: 23.59 KG/M2 | OXYGEN SATURATION: 100 % | SYSTOLIC BLOOD PRESSURE: 147 MMHG | DIASTOLIC BLOOD PRESSURE: 84 MMHG | WEIGHT: 178 LBS | RESPIRATION RATE: 18 BRPM | TEMPERATURE: 99 F

## 2020-05-13 PROCEDURE — 63600175 PHARM REV CODE 636 W HCPCS: Performed by: EMERGENCY MEDICINE

## 2020-05-13 PROCEDURE — 96360 HYDRATION IV INFUSION INIT: CPT

## 2020-05-13 NOTE — ED PROVIDER NOTES
"Encounter Date: 5/12/2020       History     Chief Complaint   Patient presents with    multiple complaints     Reports high blood sugar, frequent episodes of "just passing out", and pain/drainage to wound on L leg from recent surgery      Time seen by provider: 9:54 PM    This is a 39 y.o. male who presents with elevated blood sugar, lower extremity wounds, and syncopal episodes.  Patient states that for the past 6 weeks, every time he gets up, he nearly passes out.  He states that he frequently has passed out, but sometimes he is able to catch himself.  Patient states that he starts to feel dizzy, with blurry vision, prior to his syncopal episodes.  Patient denies any chest pain or shortness of breath.  Patient is also concerned that his blood sugars elevated.  Patient was seen at [facility] earlier today and was told that his sugar was in the 400s, which really concerned him and also brought him in for evaluation today.  Patient also is concerned for the open wounds on his right lower extremity.  Patient did recently have surgery at [facility].  The patient states that he does have home health which visits frequently and he has been compliant about going to [facility].  He denies any increased redness or drainage to the wounds.  He denies any fevers or chills.    The history is provided by the patient.     Review of patient's allergies indicates:  No Known Allergies  Past Medical History:   Diagnosis Date    Diabetes mellitus      Past Surgical History:   Procedure Laterality Date    DEBRIDEMENT OF FOOT Bilateral 12/9/2019    Procedure: DEBRIDEMENT, FOOT;  Surgeon: Yesenia Buck DPM;  [facility];  Service: Podiatry;  Laterality: Bilateral;     Family History   Problem Relation Age of Onset    Diabetes Mother     No Known Problems Father      Social History     Tobacco Use    Smoking status: Current Every Day Smoker     Packs/day: 0.50     Types: Cigarettes    Smokeless tobacco: Never Used   Substance " Use Topics    Alcohol use: Yes     Alcohol/week: 28.0 standard drinks     Types: 28 Cans of beer per week    Drug use: Yes     Review of Systems   Constitutional: Negative for chills and fever.   HENT: Negative for congestion and rhinorrhea.    Respiratory: Negative for chest tightness and shortness of breath.    Cardiovascular: Negative for chest pain and palpitations.   Gastrointestinal: Negative for abdominal pain, diarrhea, nausea and vomiting.   Genitourinary: Negative for dysuria and flank pain.   Skin: Positive for wound. Negative for color change.   Neurological: Positive for dizziness and syncope.       Physical Exam     Initial Vitals [05/12/20 2122]   BP Pulse Resp Temp SpO2   132/86 (!) 115 18 98.7 °F (37.1 °C) 97 %      MAP       --         Physical Exam    Nursing note and vitals reviewed.  Constitutional: He appears well-developed and well-nourished.   HENT:   Head: Normocephalic and atraumatic.   Eyes: Conjunctivae are normal.   Neck: Normal range of motion. Neck supple.   Cardiovascular: Regular rhythm and normal heart sounds.   Tachycardic.   Pulmonary/Chest: Breath sounds normal. No respiratory distress. He has no wheezes. He has no rales.   Abdominal: Soft. Bowel sounds are normal. He exhibits no distension. There is no tenderness. There is no rebound.   Musculoskeletal: Normal range of motion. He exhibits no edema.   Neurological: He is alert and oriented to person, place, and time.   Skin: Skin is warm and dry. Capillary refill takes less than 2 seconds.   Two open wounds on the right medial calf, 1 approximately 3 cm in size and the other approximately 1 cm in size.  There is a well-healed erythematous base.  There is no drainage.  There is no surrounding erythema.  There is also a wound to the base of the 1st metatarsal approximately 1 cm in size and a 0.5 cm wound at the lateral edge of the 5th metatarsal.         ED Course   Procedures  Labs Reviewed   URINALYSIS - Abnormal; Notable for  the following components:       Result Value    Glucose, UA Trace (*)     Leukocytes, UA Trace (*)     All other components within normal limits   COMPREHENSIVE METABOLIC PANEL - Abnormal; Notable for the following components:    Chloride 111 (*)     Glucose 182 (*)     BUN, Bld 2 (*)     Calcium 7.6 (*)     Albumin 2.6 (*)     Total Bilirubin 1.2 (*)     Alkaline Phosphatase 276 (*)     All other components within normal limits   CBC W/ AUTO DIFFERENTIAL - Abnormal; Notable for the following components:    RBC 3.72 (*)     Hemoglobin 10.5 (*)     Hematocrit 32.9 (*)     Mean Corpuscular Hemoglobin Conc 31.9 (*)     RDW 15.9 (*)     Gran # (ANC) 1.5 (*)     Gran% 28.9 (*)     Lymph% 55.6 (*)     All other components within normal limits   MAGNESIUM - Abnormal; Notable for the following components:    Magnesium 1.5 (*)     All other components within normal limits   ALCOHOL,MEDICAL (ETHANOL) - Abnormal; Notable for the following components:    Alcohol, Medical, Serum 220 (*)     All other components within normal limits   BETA - HYDROXYBUTYRATE, SERUM   URINALYSIS MICROSCOPIC     EKG Readings: (Independently Interpreted)   9:54PM: Rate of 104. Sinus tachycardia.  Normal axis. Normal intervals. No ST or ischemic changes.       ECG Results          EKG 12-lead (In process)  Result time 05/13/20 05:57:48    In process by Interface, Lab In [facility] (05/13/20 05:57:48)                 Narrative:    Test Reason : R55,    Vent. Rate : 104 BPM     Atrial Rate : 104 BPM     P-R Int : 126 ms          QRS Dur : 086 ms      QT Int : 344 ms       P-R-T Axes : 059 028 -10 degrees     QTc Int : 452 ms    Sinus tachycardia  Otherwise normal ECG      Referred By: [email or web address]   SELF           Confirmed By:                             Imaging Results    None          Medical Decision Making:   History:   Old Medical Records: I decided to obtain old medical records.  Old Records Summarized: other records and records from clinic  visits.  Initial Assessment:   9:54PM:  Patient is a 31-year-old male who presents to the emergency department with multiple concerns with multiple syncopal episodes, hyperglycemia, and open wounds the leg.  On exam, I do not appreciate any evidence of infection of the open wound and I do feel that they are well healing.  In reviewing his records, it does seem that he is well established with wound care and home health.  Patient also was seen earlier at [facility] for similar symptoms, was found to be intoxicated with an alcohol level in the 300s.  I did inquire the patient about this, and he states that he only had 1 drink today.  Will plan to check labs, IV fluids, EKG, will continue to follow and reassess.  Independently Interpreted Test(s):   I have ordered and independently interpreted EKG Reading(s) - see prior notes  Clinical Tests:   Lab Tests: Ordered and Reviewed  Medical Tests: Ordered and Reviewed    11:10 PM:  I discussed the pt with Dr. Lowe, who will f/u with the pt regarding results and disposition.                     ED Course as of May 13 0924   Wed May 13, 2020   0056 Assumed care of patient, patient resting comfortably. Will continue to monitor until sober    [MA]   0433 Patient clinically sober. We reviewed his findings, need to follow up. Patient has syncopal episodes, I believe that this is likely due to his alcohol abuse.  Patient without any evidence Wernicke's encephalopathy at this time. Patient has Home health and good follow up.  Educated the patient on warning signs and symptoms for which he must seek immediate medical attention    [MA]      ED Course User Index  [MA] Amrit Lowe MD                Clinical Impression:     1. Hyperglycemia    2. Syncope    3. Alcoholic intoxication with complication                ED Disposition Condition    Discharge Stable        ED Prescriptions     None        Follow-up Information     Follow up With Specialties Details Why Contact Info    Leopoldo WILSON  MD [facility] Schedule an appointment as soon as possible for a visit in 3 days For follow up and re-evaluation [address] [facility] [id number]  [phone]      [facility]  As needed, for any new or worsening symptoms [address] [facility] [id number] [phone]                                     Jessica Pablo MD  05/13/20 0932

## 2020-05-13 NOTE — ED TRIAGE NOTES
"Pt came to ED c/o dizziness. Pt states, " I  feeling like passing out" x 2 months. Pt has hx of diabetes. Pt reports having surgery on Right leg due to infection. Pt has open wounds to right calf, no redness, edema, and swelling to upper calf 3 cm and lower wound 1cm.   "

## 2020-05-13 NOTE — ED NOTES
Pt rounding complete. Pt sleeping with HOB elevated, NAD noted.  Restroom and comfort needs addressed.  Pt updated on plan of care.  Call light within reach.  Will continue to monitor.

## 2020-05-24 ENCOUNTER — HOSPITAL ENCOUNTER (EMERGENCY)
Facility: HOSPITAL | Age: 40
Discharge: HOME | End: 2020-05-24
Attending: EMERGENCY MEDICINE
Payer: MEDICAID

## 2020-05-24 VITALS
TEMPERATURE: 99 F | DIASTOLIC BLOOD PRESSURE: 84 MMHG | OXYGEN SATURATION: 100 % | SYSTOLIC BLOOD PRESSURE: 131 MMHG | WEIGHT: 178 LBS | HEART RATE: 116 BPM | RESPIRATION RATE: 16 BRPM | BODY MASS INDEX: 23.48 KG/M2
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
## 2020-05-24 DIAGNOSIS — S60.221A CONTUSION OF RIGHT HAND, INITIAL ENCOUNTER: ICD-10-CM

## 2020-05-24 DIAGNOSIS — H21.01 HYPHEMA OF RIGHT EYE: ICD-10-CM

## 2020-05-24 DIAGNOSIS — F10.920 ALCOHOLIC INTOXICATION WITHOUT COMPLICATION: Primary | ICD-10-CM

## 2020-05-24 DIAGNOSIS — R73.9 HYPERGLYCEMIA: ICD-10-CM

## 2020-05-24 DIAGNOSIS — S00.83XA CONTUSION OF FOREHEAD, INITIAL ENCOUNTER: ICD-10-CM

## 2020-05-24 LAB
ALBUMIN SERPL BCP-MCNC: 3 G/DL (ref 3.5–5.2)
ALLENS TEST: ABNORMAL
ALP SERPL-CCNC: 374 U/L (ref 55–135)
ALT SERPL W/O P-5'-P-CCNC: 26 U/L (ref 10–44)
AMPHET+METHAMPHET UR QL: NORMAL
ANION GAP SERPL CALC-SCNC: 11 MMOL/L (ref 8–16)
AST SERPL-CCNC: 46 U/L (ref 10–40)
B-OH-BUTYR BLD STRIP-SCNC: 0.1 MMOL/L (ref 0–0.5)
BARBITURATES UR QL SCN>200 NG/ML: NEGATIVE
BASOPHILS # BLD AUTO: 0.1 K/UL (ref 0–0.2)
BASOPHILS NFR BLD: 1.3 % (ref 0–1.9)
BENZODIAZ UR QL SCN>200 NG/ML: NEGATIVE
BILIRUB SERPL-MCNC: 1.6 MG/DL (ref 0.1–1)
BUN SERPL-MCNC: 2 MG/DL (ref 6–20)
BZE UR QL SCN: NEGATIVE
CALCIUM SERPL-MCNC: 8.1 MG/DL (ref 8.7–10.5)
CANNABINOIDS UR QL SCN: NEGATIVE
CHLORIDE SERPL-SCNC: 109 MMOL/L (ref 95–110)
CO2 SERPL-SCNC: 20 MMOL/L (ref 23–29)
CREAT SERPL-MCNC: 0.9 MG/DL (ref 0.5–1.4)
CREAT UR-MCNC: 49.9 MG/DL (ref 23–375)
DELSYS: ABNORMAL
DIFFERENTIAL METHOD: ABNORMAL
EOSINOPHIL # BLD AUTO: 0.1 K/UL (ref 0–0.5)
EOSINOPHIL NFR BLD: 1.2 % (ref 0–8)
ERYTHROCYTE [DISTWIDTH] IN BLOOD BY AUTOMATED COUNT: 16.6 % (ref 11.5–14.5)
EST. GFR  (AFRICAN AMERICAN): >60 ML/MIN/1.73 M^2
EST. GFR  (NON AFRICAN AMERICAN): >60 ML/MIN/1.73 M^2
ETHANOL SERPL-MCNC: 278 MG/DL
FIO2: 21
GLUCOSE SERPL-MCNC: 326 MG/DL (ref 70–110)
HCO3 UR-SCNC: 24.4 MMOL/L (ref 24–28)
HCT VFR BLD AUTO: 36.3 % (ref 40–54)
HGB BLD-MCNC: 11.3 G/DL (ref 14–18)
IMM GRANULOCYTES # BLD AUTO: 0.01 K/UL (ref 0–0.04)
IMM GRANULOCYTES NFR BLD AUTO: 0.1 % (ref 0–0.5)
LYMPHOCYTES # BLD AUTO: 3.5 K/UL (ref 1–4.8)
LYMPHOCYTES NFR BLD: 47.1 % (ref 18–48)
MCH RBC QN AUTO: 27.8 PG (ref 27–31)
MCHC RBC AUTO-ENTMCNC: 31.1 G/DL (ref 32–36)
MCV RBC AUTO: 89 FL (ref 82–98)
METHADONE UR QL SCN>300 NG/ML: NEGATIVE
MODE: ABNORMAL
MONOCYTES # BLD AUTO: 1 K/UL (ref 0.3–1)
MONOCYTES NFR BLD: 13.3 % (ref 4–15)
NEUTROPHILS # BLD AUTO: 2.8 K/UL (ref 1.8–7.7)
NEUTROPHILS NFR BLD: 37 % (ref 38–73)
NRBC BLD-RTO: 0 /100 WBC
OPIATES UR QL SCN: NEGATIVE
PCO2 BLDA: 42.4 MMHG (ref 35–45)
PCP UR QL SCN>25 NG/ML: NEGATIVE
PH SMN: 7.37 [PH] (ref 7.35–7.45)
PLATELET # BLD AUTO: 120 K/UL (ref 150–350)
PMV BLD AUTO: 10.2 FL (ref 9.2–12.9)
PO2 BLDA: 41 MMHG (ref 40–60)
POC BE: -1 MMOL/L
POC SATURATED O2: 75 % (ref 95–100)
POC TCO2: 26 MMOL/L (ref 24–29)
POTASSIUM SERPL-SCNC: 3.6 MMOL/L (ref 3.5–5.1)
PROT SERPL-MCNC: 7.8 G/DL (ref 6–8.4)
RBC # BLD AUTO: 4.07 M/UL (ref 4.6–6.2)
SAMPLE: ABNORMAL
SITE: ABNORMAL
SODIUM SERPL-SCNC: 140 MMOL/L (ref 136–145)
TOXICOLOGY INFORMATION: NORMAL
WBC # BLD AUTO: 7.46 K/UL (ref 3.9–12.7)

## 2020-05-24 PROCEDURE — 36415 COLL VENOUS BLD VENIPUNCTURE: CPT

## 2020-05-24 PROCEDURE — 80307 DRUG TEST PRSMV CHEM ANLYZR: CPT

## 2020-05-24 PROCEDURE — 80053 COMPREHEN METABOLIC PANEL: CPT

## 2020-05-24 PROCEDURE — 80320 DRUG SCREEN QUANTALCOHOLS: CPT

## 2020-05-24 PROCEDURE — 82803 BLOOD GASES ANY COMBINATION: CPT

## 2020-05-24 PROCEDURE — 82010 KETONE BODYS QUAN: CPT

## 2020-05-24 PROCEDURE — 99285 EMERGENCY DEPT VISIT HI MDM: CPT | Mod: 25

## 2020-05-24 PROCEDURE — 25000003 PHARM REV CODE 250: Performed by: EMERGENCY MEDICINE

## 2020-05-24 PROCEDURE — 96360 HYDRATION IV INFUSION INIT: CPT

## 2020-05-24 PROCEDURE — 36600 WITHDRAWAL OF ARTERIAL BLOOD: CPT

## 2020-05-24 PROCEDURE — 85025 COMPLETE CBC W/AUTO DIFF WBC: CPT

## 2020-05-24 RX ORDER — SODIUM CHLORIDE 9 MG/ML
1000 INJECTION, SOLUTION INTRAVENOUS
Status: COMPLETED | OUTPATIENT
Start: 2020-05-24 | End: 2020-05-24

## 2020-05-24 RX ADMIN — SODIUM CHLORIDE 1000 ML: 0.9 INJECTION, SOLUTION INTRAVENOUS at 03:05
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
## 2020-06-06 ENCOUNTER — HOSPITAL ENCOUNTER (INPATIENT)
Facility: HOSPITAL | Age: 40
LOS: 7 days | Discharge: NURSING, REHAB OR LONG-TERM CARE | DRG: 872 | End: 2020-06-13
Attending: EMERGENCY MEDICINE | Admitting: INTERNAL MEDICINE
Payer: MEDICAID

## 2020-06-06 DIAGNOSIS — L03.115 CELLULITIS OF RIGHT LOWER EXTREMITY: ICD-10-CM

## 2020-06-06 DIAGNOSIS — R78.81 MRSA BACTEREMIA: Primary | ICD-10-CM

## 2020-06-06 DIAGNOSIS — E11.621 DIABETIC ULCER OF RIGHT FOOT ASSOCIATED WITH TYPE 2 DIABETES MELLITUS, WITH OTHER ULCER SEVERITY, UNSPECIFIED PART OF FOOT: ICD-10-CM

## 2020-06-06 DIAGNOSIS — L97.518 DIABETIC ULCER OF RIGHT FOOT ASSOCIATED WITH TYPE 2 DIABETES MELLITUS, WITH OTHER ULCER SEVERITY, UNSPECIFIED PART OF FOOT: ICD-10-CM

## 2020-06-06 DIAGNOSIS — M79.669 PAIN AND SWELLING OF LOWER LEG, UNSPECIFIED LATERALITY: ICD-10-CM

## 2020-06-06 DIAGNOSIS — A41.9 SEPSIS, DUE TO UNSPECIFIED ORGANISM, UNSPECIFIED WHETHER ACUTE ORGAN DYSFUNCTION PRESENT: ICD-10-CM

## 2020-06-06 DIAGNOSIS — B95.62 MRSA BACTEREMIA: Primary | ICD-10-CM
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
## 2020-06-06 DIAGNOSIS — M79.89 PAIN AND SWELLING OF LOWER LEG, UNSPECIFIED LATERALITY: ICD-10-CM

## 2020-06-06 LAB
ALBUMIN SERPL BCP-MCNC: 2.8 G/DL (ref 3.5–5.2)
ALP SERPL-CCNC: 184 U/L (ref 55–135)
ALT SERPL W/O P-5'-P-CCNC: 20 U/L (ref 10–44)
AMPHET+METHAMPHET UR QL: NEGATIVE
ANION GAP SERPL CALC-SCNC: 13 MMOL/L (ref 8–16)
AST SERPL-CCNC: 33 U/L (ref 10–40)
BACTERIA #/AREA URNS HPF: NEGATIVE /HPF
BARBITURATES UR QL SCN>200 NG/ML: NEGATIVE
BASOPHILS # BLD AUTO: 0.06 K/UL (ref 0–0.2)
BASOPHILS NFR BLD: 0.7 % (ref 0–1.9)
BENZODIAZ UR QL SCN>200 NG/ML: NEGATIVE
BILIRUB SERPL-MCNC: 3 MG/DL (ref 0.1–1)
BILIRUB UR QL STRIP: NEGATIVE
BUN SERPL-MCNC: 6 MG/DL (ref 6–20)
BZE UR QL SCN: NEGATIVE
CALCIUM SERPL-MCNC: 8.2 MG/DL (ref 8.7–10.5)
CANNABINOIDS UR QL SCN: NEGATIVE
CHLORIDE SERPL-SCNC: 99 MMOL/L (ref 95–110)
CK SERPL-CCNC: 159 U/L (ref 20–200)
CLARITY UR: CLEAR
CO2 SERPL-SCNC: 19 MMOL/L (ref 23–29)
COLOR UR: YELLOW
CREAT SERPL-MCNC: 0.8 MG/DL (ref 0.5–1.4)
CREAT UR-MCNC: 137 MG/DL (ref 23–375)
CRP SERPL-MCNC: 11.07 MG/DL
DIFFERENTIAL METHOD: ABNORMAL
EOSINOPHIL # BLD AUTO: 0 K/UL (ref 0–0.5)
EOSINOPHIL NFR BLD: 0.1 % (ref 0–8)
ERYTHROCYTE [DISTWIDTH] IN BLOOD BY AUTOMATED COUNT: 16 % (ref 11.5–14.5)
ERYTHROCYTE [SEDIMENTATION RATE] IN BLOOD BY WESTERGREN METHOD: 46 MM/HR (ref 0–10)
EST. GFR  (AFRICAN AMERICAN): >60 ML/MIN/1.73 M^2
EST. GFR  (NON AFRICAN AMERICAN): >60 ML/MIN/1.73 M^2
GLUCOSE SERPL-MCNC: 231 MG/DL (ref 70–110)
GLUCOSE UR QL STRIP: ABNORMAL
HCT VFR BLD AUTO: 32.7 % (ref 40–54)
HGB BLD-MCNC: 10.4 G/DL (ref 14–18)
HGB UR QL STRIP: ABNORMAL
HYALINE CASTS #/AREA URNS LPF: 5 /LPF
IMM GRANULOCYTES # BLD AUTO: 0.03 K/UL (ref 0–0.04)
IMM GRANULOCYTES NFR BLD AUTO: 0.3 % (ref 0–0.5)
KETONES UR QL STRIP: ABNORMAL
LACTATE SERPL-SCNC: 4.6 MMOL/L (ref 0.5–1.9)
LEUKOCYTE ESTERASE UR QL STRIP: NEGATIVE
LYMPHOCYTES # BLD AUTO: 1.6 K/UL (ref 1–4.8)
LYMPHOCYTES NFR BLD: 17.3 % (ref 18–48)
MCH RBC QN AUTO: 27.2 PG (ref 27–31)
MCHC RBC AUTO-ENTMCNC: 31.8 G/DL (ref 32–36)
MCV RBC AUTO: 85 FL (ref 82–98)
MICROSCOPIC COMMENT: ABNORMAL
MONOCYTES # BLD AUTO: 1.1 K/UL (ref 0.3–1)
MONOCYTES NFR BLD: 11.9 % (ref 4–15)
NEUTROPHILS # BLD AUTO: 6.4 K/UL (ref 1.8–7.7)
NEUTROPHILS NFR BLD: 69.7 % (ref 38–73)
NITRITE UR QL STRIP: NEGATIVE
NRBC BLD-RTO: 0 /100 WBC
OPIATES UR QL SCN: NEGATIVE
PCP UR QL SCN>25 NG/ML: NEGATIVE
PH UR STRIP: 6 [PH] (ref 5–8)
PLATELET # BLD AUTO: 145 K/UL (ref 150–350)
PMV BLD AUTO: 11 FL (ref 9.2–12.9)
POTASSIUM SERPL-SCNC: 3.7 MMOL/L (ref 3.5–5.1)
PROT SERPL-MCNC: 7 G/DL (ref 6–8.4)
PROT UR QL STRIP: ABNORMAL
RBC # BLD AUTO: 3.83 M/UL (ref 4.6–6.2)
RBC #/AREA URNS HPF: 6 /HPF (ref 0–4)
SARS-COV-2 RDRP RESP QL NAA+PROBE: NEGATIVE
SODIUM SERPL-SCNC: 131 MMOL/L (ref 136–145)
SP GR UR STRIP: 1.02 (ref 1–1.03)
SQUAMOUS #/AREA URNS HPF: 1 /HPF
TOXICOLOGY INFORMATION: NORMAL
URN SPEC COLLECT METH UR: ABNORMAL
UROBILINOGEN UR STRIP-ACNC: ABNORMAL EU/DL
WBC # BLD AUTO: 9.11 K/UL (ref 3.9–12.7)
WBC #/AREA URNS HPF: 2 /HPF (ref 0–5)
YEAST URNS QL MICRO: ABNORMAL

## 2020-06-06 PROCEDURE — U0002 COVID-19 LAB TEST NON-CDC: HCPCS

## 2020-06-06 PROCEDURE — 96372 THER/PROPH/DIAG INJ SC/IM: CPT | Mod: 59

## 2020-06-06 PROCEDURE — 63600175 PHARM REV CODE 636 W HCPCS: Performed by: STUDENT IN AN ORGANIZED HEALTH CARE EDUCATION/TRAINING PROGRAM

## 2020-06-06 PROCEDURE — 85651 RBC SED RATE NONAUTOMATED: CPT

## 2020-06-06 PROCEDURE — 85025 COMPLETE CBC W/AUTO DIFF WBC: CPT

## 2020-06-06 PROCEDURE — 87077 CULTURE AEROBIC IDENTIFY: CPT

## 2020-06-06 PROCEDURE — 25000003 PHARM REV CODE 250: Performed by: EMERGENCY MEDICINE

## 2020-06-06 PROCEDURE — 87147 CULTURE TYPE IMMUNOLOGIC: CPT | Mod: 59

## 2020-06-06 PROCEDURE — 99285 EMERGENCY DEPT VISIT HI MDM: CPT | Mod: 25

## 2020-06-06 PROCEDURE — 80053 COMPREHEN METABOLIC PANEL: CPT

## 2020-06-06 PROCEDURE — 96365 THER/PROPH/DIAG IV INF INIT: CPT

## 2020-06-06 PROCEDURE — 86140 C-REACTIVE PROTEIN: CPT

## 2020-06-06 PROCEDURE — 80307 DRUG TEST PRSMV CHEM ANLYZR: CPT

## 2020-06-06 PROCEDURE — 12000002 HC ACUTE/MED SURGE SEMI-PRIVATE ROOM

## 2020-06-06 PROCEDURE — 87040 BLOOD CULTURE FOR BACTERIA: CPT

## 2020-06-06 PROCEDURE — 81001 URINALYSIS AUTO W/SCOPE: CPT

## 2020-06-06 PROCEDURE — 63600175 PHARM REV CODE 636 W HCPCS: Performed by: EMERGENCY MEDICINE

## 2020-06-06 PROCEDURE — 82550 ASSAY OF CK (CPK): CPT

## 2020-06-06 PROCEDURE — 83605 ASSAY OF LACTIC ACID: CPT

## 2020-06-06 PROCEDURE — 87186 SC STD MICRODIL/AGAR DIL: CPT

## 2020-06-06 RX ORDER — SODIUM,POTASSIUM PHOSPHATES 280-250MG
2 POWDER IN PACKET (EA) ORAL
Status: DISCONTINUED | OUTPATIENT
Start: 2020-06-07 | End: 2020-06-13 | Stop reason: HOSPADM

## 2020-06-06 RX ORDER — SODIUM CHLORIDE 0.9 % (FLUSH) 0.9 %
10 SYRINGE (ML) INJECTION
Status: DISCONTINUED | OUTPATIENT
Start: 2020-06-07 | End: 2020-06-13 | Stop reason: HOSPADM

## 2020-06-06 RX ORDER — HYDROMORPHONE HYDROCHLORIDE 1 MG/ML
1 INJECTION, SOLUTION INTRAMUSCULAR; INTRAVENOUS; SUBCUTANEOUS
Status: COMPLETED | OUTPATIENT
Start: 2020-06-06 | End: 2020-06-06

## 2020-06-06 RX ORDER — LANOLIN ALCOHOL/MO/W.PET/CERES
800 CREAM (GRAM) TOPICAL
Status: DISCONTINUED | OUTPATIENT
Start: 2020-06-07 | End: 2020-06-13 | Stop reason: HOSPADM

## 2020-06-06 RX ORDER — INSULIN ASPART 100 [IU]/ML
0-5 INJECTION, SOLUTION INTRAVENOUS; SUBCUTANEOUS
Status: DISCONTINUED | OUTPATIENT
Start: 2020-06-07 | End: 2020-06-13 | Stop reason: HOSPADM

## 2020-06-06 RX ORDER — VANCOMYCIN HCL IN 5 % DEXTROSE 1G/250ML
1000 PLASTIC BAG, INJECTION (ML) INTRAVENOUS ONCE
Status: COMPLETED | OUTPATIENT
Start: 2020-06-06 | End: 2020-06-07

## 2020-06-06 RX ORDER — IBUPROFEN 200 MG
24 TABLET ORAL
Status: DISCONTINUED | OUTPATIENT
Start: 2020-06-07 | End: 2020-06-13 | Stop reason: HOSPADM

## 2020-06-06 RX ORDER — ACETAMINOPHEN 325 MG/1
650 TABLET ORAL EVERY 4 HOURS PRN
Status: DISCONTINUED | OUTPATIENT
Start: 2020-06-07 | End: 2020-06-13 | Stop reason: HOSPADM

## 2020-06-06 RX ORDER — MORPHINE SULFATE 2 MG/ML
2 INJECTION, SOLUTION INTRAMUSCULAR; INTRAVENOUS EVERY 4 HOURS PRN
Status: DISCONTINUED | OUTPATIENT
Start: 2020-06-07 | End: 2020-06-13 | Stop reason: HOSPADM

## 2020-06-06 RX ORDER — ACETAMINOPHEN 500 MG
1000 TABLET ORAL
Status: COMPLETED | OUTPATIENT
Start: 2020-06-06 | End: 2020-06-06

## 2020-06-06 RX ORDER — ONDANSETRON 2 MG/ML
4 INJECTION INTRAMUSCULAR; INTRAVENOUS EVERY 8 HOURS PRN
Status: DISCONTINUED | OUTPATIENT
Start: 2020-06-07 | End: 2020-06-13 | Stop reason: HOSPADM

## 2020-06-06 RX ORDER — ENOXAPARIN SODIUM 100 MG/ML
40 INJECTION SUBCUTANEOUS EVERY 24 HOURS
Status: DISCONTINUED | OUTPATIENT
Start: 2020-06-07 | End: 2020-06-08

## 2020-06-06 RX ORDER — POTASSIUM CHLORIDE 20 MEQ/15ML
40 SOLUTION ORAL
Status: DISCONTINUED | OUTPATIENT
Start: 2020-06-07 | End: 2020-06-13 | Stop reason: HOSPADM

## 2020-06-06 RX ORDER — GLUCAGON 1 MG
1 KIT INJECTION
Status: DISCONTINUED | OUTPATIENT
Start: 2020-06-07 | End: 2020-06-13 | Stop reason: HOSPADM

## 2020-06-06 RX ORDER — INSULIN GLARGINE 100 [IU]/ML
25 INJECTION, SOLUTION SUBCUTANEOUS NIGHTLY
COMMUNITY

## 2020-06-06 RX ORDER — TALC
6 POWDER (GRAM) TOPICAL NIGHTLY PRN
Status: DISCONTINUED | OUTPATIENT
Start: 2020-06-07 | End: 2020-06-13 | Stop reason: HOSPADM

## 2020-06-06 RX ORDER — IBUPROFEN 200 MG
16 TABLET ORAL
Status: DISCONTINUED | OUTPATIENT
Start: 2020-06-07 | End: 2020-06-13 | Stop reason: HOSPADM

## 2020-06-06 RX ORDER — HYDROCODONE BITARTRATE AND ACETAMINOPHEN 5; 325 MG/1; MG/1
1 TABLET ORAL EVERY 6 HOURS PRN
Status: DISCONTINUED | OUTPATIENT
Start: 2020-06-07 | End: 2020-06-13 | Stop reason: HOSPADM

## 2020-06-06 RX ORDER — ACETAMINOPHEN 325 MG/1
650 TABLET ORAL EVERY 8 HOURS PRN
Status: DISCONTINUED | OUTPATIENT
Start: 2020-06-07 | End: 2020-06-13 | Stop reason: HOSPADM

## 2020-06-06 RX ORDER — HYDROMORPHONE HYDROCHLORIDE 1 MG/ML
1 INJECTION, SOLUTION INTRAMUSCULAR; INTRAVENOUS; SUBCUTANEOUS
Status: DISCONTINUED | OUTPATIENT
Start: 2020-06-06 | End: 2020-06-06

## 2020-06-06 RX ADMIN — ACETAMINOPHEN 1000 MG: 500 TABLET, FILM COATED ORAL at 09:06

## 2020-06-06 RX ADMIN — SODIUM CHLORIDE 1313 ML: 9 INJECTION, SOLUTION INTRAVENOUS at 09:06

## 2020-06-06 RX ADMIN — HYDROMORPHONE HYDROCHLORIDE 1 MG: 1 INJECTION, SOLUTION INTRAMUSCULAR; INTRAVENOUS; SUBCUTANEOUS at 09:06

## 2020-06-06 RX ADMIN — SODIUM CHLORIDE, SODIUM LACTATE, POTASSIUM CHLORIDE, AND CALCIUM CHLORIDE 1000 ML: .6; .31; .03; .02 INJECTION, SOLUTION INTRAVENOUS at 10:06

## 2020-06-06 RX ADMIN — HYDROMORPHONE HYDROCHLORIDE 1 MG: 1 INJECTION, SOLUTION INTRAMUSCULAR; INTRAVENOUS; SUBCUTANEOUS at 11:06

## 2020-06-06 RX ADMIN — VANCOMYCIN HYDROCHLORIDE 1000 MG: 1 INJECTION, POWDER, LYOPHILIZED, FOR SOLUTION INTRAVENOUS at 10:06

## 2020-06-06 RX ADMIN — VANCOMYCIN HYDROCHLORIDE 500 MG: 500 INJECTION, POWDER, LYOPHILIZED, FOR SOLUTION INTRAVENOUS at 10:06

## 2020-06-07 PROBLEM — L03.115 CELLULITIS OF RIGHT LOWER EXTREMITY: Status: ACTIVE | Noted: 2020-06-07

## 2020-06-07 PROBLEM — F19.10 POLYSUBSTANCE ABUSE: Status: ACTIVE | Noted: 2020-06-07

## 2020-06-07 LAB
ANION GAP SERPL CALC-SCNC: 9 MMOL/L (ref 8–16)
BASOPHILS # BLD AUTO: 0.05 K/UL (ref 0–0.2)
BASOPHILS NFR BLD: 0.5 % (ref 0–1.9)
BUN SERPL-MCNC: 6 MG/DL (ref 6–20)
CALCIUM SERPL-MCNC: 7.5 MG/DL (ref 8.7–10.5)
CHLORIDE SERPL-SCNC: 103 MMOL/L (ref 95–110)
CK SERPL-CCNC: 148 U/L (ref 20–200)
CK SERPL-CCNC: 163 U/L (ref 20–200)
CK SERPL-CCNC: 184 U/L (ref 20–200)
CO2 SERPL-SCNC: 22 MMOL/L (ref 23–29)
CREAT SERPL-MCNC: 0.6 MG/DL (ref 0.5–1.4)
DIFFERENTIAL METHOD: ABNORMAL
EOSINOPHIL # BLD AUTO: 0.1 K/UL (ref 0–0.5)
EOSINOPHIL NFR BLD: 0.6 % (ref 0–8)
ERYTHROCYTE [DISTWIDTH] IN BLOOD BY AUTOMATED COUNT: 15.7 % (ref 11.5–14.5)
EST. GFR  (AFRICAN AMERICAN): >60 ML/MIN/1.73 M^2
EST. GFR  (NON AFRICAN AMERICAN): >60 ML/MIN/1.73 M^2
GLUCOSE SERPL-MCNC: 111 MG/DL (ref 70–110)
GLUCOSE SERPL-MCNC: 117 MG/DL (ref 70–110)
GLUCOSE SERPL-MCNC: 125 MG/DL (ref 70–110)
GLUCOSE SERPL-MCNC: 128 MG/DL (ref 70–110)
GLUCOSE SERPL-MCNC: 136 MG/DL (ref 70–110)
GLUCOSE SERPL-MCNC: 146 MG/DL (ref 70–110)
HCT VFR BLD AUTO: 31.6 % (ref 40–54)
HGB BLD-MCNC: 10 G/DL (ref 14–18)
IMM GRANULOCYTES # BLD AUTO: 0.03 K/UL (ref 0–0.04)
IMM GRANULOCYTES NFR BLD AUTO: 0.3 % (ref 0–0.5)
LACTATE SERPL-SCNC: 1.5 MMOL/L (ref 0.5–1.9)
LACTATE SERPL-SCNC: 2.1 MMOL/L (ref 0.5–1.9)
LYMPHOCYTES # BLD AUTO: 2.4 K/UL (ref 1–4.8)
LYMPHOCYTES NFR BLD: 24.3 % (ref 18–48)
MAGNESIUM SERPL-MCNC: 1.1 MG/DL (ref 1.6–2.6)
MCH RBC QN AUTO: 27 PG (ref 27–31)
MCHC RBC AUTO-ENTMCNC: 31.6 G/DL (ref 32–36)
MCV RBC AUTO: 85 FL (ref 82–98)
MONOCYTES # BLD AUTO: 1.2 K/UL (ref 0.3–1)
MONOCYTES NFR BLD: 12.6 % (ref 4–15)
NEUTROPHILS # BLD AUTO: 6.1 K/UL (ref 1.8–7.7)
NEUTROPHILS NFR BLD: 61.7 % (ref 38–73)
NRBC BLD-RTO: 0 /100 WBC
PLATELET # BLD AUTO: 149 K/UL (ref 150–350)
PMV BLD AUTO: 10.9 FL (ref 9.2–12.9)
POTASSIUM SERPL-SCNC: 3.3 MMOL/L (ref 3.5–5.1)
RBC # BLD AUTO: 3.71 M/UL (ref 4.6–6.2)
SODIUM SERPL-SCNC: 134 MMOL/L (ref 136–145)
WBC # BLD AUTO: 9.85 K/UL (ref 3.9–12.7)

## 2020-06-07 PROCEDURE — 85025 COMPLETE CBC W/AUTO DIFF WBC: CPT

## 2020-06-07 PROCEDURE — C9399 UNCLASSIFIED DRUGS OR BIOLOG: HCPCS | Performed by: NURSE PRACTITIONER

## 2020-06-07 PROCEDURE — 82550 ASSAY OF CK (CPK): CPT | Mod: 91

## 2020-06-07 PROCEDURE — 80048 BASIC METABOLIC PNL TOTAL CA: CPT

## 2020-06-07 PROCEDURE — 63600175 PHARM REV CODE 636 W HCPCS: Performed by: FAMILY MEDICINE

## 2020-06-07 PROCEDURE — 63700000 PHARM REV CODE 250 ALT 637 W/O HCPCS: Performed by: NURSE PRACTITIONER

## 2020-06-07 PROCEDURE — 83605 ASSAY OF LACTIC ACID: CPT

## 2020-06-07 PROCEDURE — 25000003 PHARM REV CODE 250: Performed by: INTERNAL MEDICINE

## 2020-06-07 PROCEDURE — 63600175 PHARM REV CODE 636 W HCPCS: Performed by: NURSE PRACTITIONER

## 2020-06-07 PROCEDURE — 36415 COLL VENOUS BLD VENIPUNCTURE: CPT

## 2020-06-07 PROCEDURE — 83735 ASSAY OF MAGNESIUM: CPT

## 2020-06-07 PROCEDURE — 12000002 HC ACUTE/MED SURGE SEMI-PRIVATE ROOM

## 2020-06-07 PROCEDURE — 63600175 PHARM REV CODE 636 W HCPCS: Performed by: INTERNAL MEDICINE

## 2020-06-07 PROCEDURE — 25000003 PHARM REV CODE 250: Performed by: NURSE PRACTITIONER

## 2020-06-07 RX ORDER — CLINDAMYCIN PHOSPHATE 900 MG/50ML
900 INJECTION, SOLUTION INTRAVENOUS
Status: DISCONTINUED | OUTPATIENT
Start: 2020-06-07 | End: 2020-06-08

## 2020-06-07 RX ORDER — POTASSIUM CHLORIDE 20 MEQ/1
40 TABLET, EXTENDED RELEASE ORAL ONCE
Status: COMPLETED | OUTPATIENT
Start: 2020-06-07 | End: 2020-06-07

## 2020-06-07 RX ORDER — SODIUM CHLORIDE 9 MG/ML
INJECTION, SOLUTION INTRAVENOUS CONTINUOUS
Status: DISCONTINUED | OUTPATIENT
Start: 2020-06-07 | End: 2020-06-07

## 2020-06-07 RX ORDER — IBUPROFEN 400 MG/1
400 TABLET ORAL 3 TIMES DAILY
Status: DISCONTINUED | OUTPATIENT
Start: 2020-06-08 | End: 2020-06-13 | Stop reason: HOSPADM

## 2020-06-07 RX ORDER — HYDRALAZINE HYDROCHLORIDE 20 MG/ML
10 INJECTION INTRAMUSCULAR; INTRAVENOUS EVERY 4 HOURS PRN
Status: DISCONTINUED | OUTPATIENT
Start: 2020-06-07 | End: 2020-06-13 | Stop reason: HOSPADM

## 2020-06-07 RX ORDER — LEVOFLOXACIN 5 MG/ML
500 INJECTION, SOLUTION INTRAVENOUS
Status: DISCONTINUED | OUTPATIENT
Start: 2020-06-07 | End: 2020-06-09

## 2020-06-07 RX ORDER — KETOROLAC TROMETHAMINE 30 MG/ML
15 INJECTION, SOLUTION INTRAMUSCULAR; INTRAVENOUS ONCE
Status: COMPLETED | OUTPATIENT
Start: 2020-06-07 | End: 2020-06-07

## 2020-06-07 RX ORDER — LABETALOL HYDROCHLORIDE 5 MG/ML
10 INJECTION, SOLUTION INTRAVENOUS
Status: DISCONTINUED | OUTPATIENT
Start: 2020-06-07 | End: 2020-06-13 | Stop reason: HOSPADM

## 2020-06-07 RX ADMIN — MORPHINE SULFATE 2 MG: 2 INJECTION, SOLUTION INTRAMUSCULAR; INTRAVENOUS at 02:06

## 2020-06-07 RX ADMIN — CLINDAMYCIN IN 5 PERCENT DEXTROSE 900 MG: 18 INJECTION, SOLUTION INTRAVENOUS at 07:06

## 2020-06-07 RX ADMIN — MAGNESIUM OXIDE 800 MG: 400 TABLET ORAL at 09:06

## 2020-06-07 RX ADMIN — ENOXAPARIN SODIUM 40 MG: 100 INJECTION SUBCUTANEOUS at 05:06

## 2020-06-07 RX ADMIN — SODIUM CHLORIDE: 0.9 INJECTION, SOLUTION INTRAVENOUS at 12:06

## 2020-06-07 RX ADMIN — SODIUM CHLORIDE: 0.9 INJECTION, SOLUTION INTRAVENOUS at 08:06

## 2020-06-07 RX ADMIN — HYDROCODONE BITARTRATE AND ACETAMINOPHEN 1 TABLET: 5; 325 TABLET ORAL at 09:06

## 2020-06-07 RX ADMIN — KETOROLAC TROMETHAMINE 15 MG: 30 INJECTION, SOLUTION INTRAMUSCULAR at 03:06

## 2020-06-07 RX ADMIN — LEVOFLOXACIN 500 MG: 500 INJECTION, SOLUTION INTRAVENOUS at 02:06

## 2020-06-07 RX ADMIN — POTASSIUM CHLORIDE 40 MEQ: 20 TABLET, EXTENDED RELEASE ORAL at 11:06

## 2020-06-07 RX ADMIN — ACETAMINOPHEN 650 MG: 325 TABLET ORAL at 11:06

## 2020-06-07 RX ADMIN — CLINDAMYCIN IN 5 PERCENT DEXTROSE 900 MG: 18 INJECTION, SOLUTION INTRAVENOUS at 10:06

## 2020-06-07 RX ADMIN — ENOXAPARIN SODIUM 40 MG: 100 INJECTION SUBCUTANEOUS at 01:06

## 2020-06-07 RX ADMIN — POTASSIUM CHLORIDE 40 MEQ: 20 SOLUTION ORAL at 09:06

## 2020-06-07 RX ADMIN — HYDROCODONE BITARTRATE AND ACETAMINOPHEN 1 TABLET: 5; 325 TABLET ORAL at 08:06

## 2020-06-07 RX ADMIN — CLINDAMYCIN IN 5 PERCENT DEXTROSE 900 MG: 18 INJECTION, SOLUTION INTRAVENOUS at 01:06

## 2020-06-07 RX ADMIN — INSULIN DETEMIR 25 UNITS: 100 INJECTION, SOLUTION SUBCUTANEOUS at 09:06

## 2020-06-07 RX ADMIN — MAGNESIUM OXIDE 800 MG: 400 TABLET ORAL at 11:06

## 2020-06-07 NOTE — PROGRESS NOTES
[facility] Medicine  Progress Note    Patient name: Flako Quintana  MRN: [id number]  Admit Date: 6/6/2020   LOS: 1 day     SUBJECTIVE:     Principal problem: Cellulitis of right lower extremity     Interval History:   Afebrile since hospital admission.  Currently on IV antibiotics.  Daily CRP ordered.  Likely acidosis resolved.  Patient had MRI of the knee that found joint effusion likely inflammatory.  Significant amount of edema.  Discussed with Orthopedic surgery, less likelihood of septic arthritis.  Patient reports that pain has improved.  Continues to have some pain however.    Scheduled Meds:   clindamycin (CLEOCIN) IVPB  900 mg Intravenous Q8H    enoxparin  40 mg Subcutaneous Daily    insulin detemir U-100  25 Units Subcutaneous QHS    ketorolac  15 mg Intravenous Once    levoFLOXacin  500 mg Intravenous Q24H     Continuous Infusions:  PRN Meds:acetaminophen, acetaminophen, dextrose 50%, dextrose 50%, glucagon (human recombinant), glucose, glucose, hydrALAZINE, HYDROcodone-acetaminophen, insulin aspart U-100, labetalol, magnesium oxide, magnesium oxide, melatonin, morphine, ondansetron, potassium chloride 10%, potassium chloride 10%, potassium chloride 10%, potassium, sodium phosphates, potassium, sodium phosphates, potassium, sodium phosphates, promethazine (PHENERGAN) IVPB, sodium chloride 0.9%    Review of patient's allergies indicates:  No Known Allergies    Review of Systems  As per subjective    OBJECTIVE:     Vital Signs (Most Recent)  Temp: 98.8 °F (37.1 °C) (06/07/20 1204)  Pulse: 101 (06/07/20 1204)  Resp: 18 (06/07/20 1204)  BP: (!) 151/91 (06/07/20 1204)  SpO2: 100 % (06/07/20 1204)    Vital Signs Range (Last 24H):  Temp:  [98.6 °F (37 °C)-101.2 °F (38.4 °C)]   Pulse:  [100-125]   Resp:  [17-18]   BP: (140-173)/(77-94)   SpO2:  [97 %-100 %]     I & O (Last 24H):    Intake/Output Summary (Last 24 hours) at 6/7/2020 1448  Last data filed at 6/7/2020 0400  Gross per 24  hour   Intake 1100 ml   Output 825 ml   Net 275 ml       Physical Exam:  General: Patient resting comfortably in no acute distress. Appears as stated age. Calm  Eyes: EOM intact. No conjunctivae injection. No scleral icterus.  ENT: Hearing grossly intact. No discharge from ears. No nasal discharge.   CVS: RRR.   Lungs: CTA BL, no wheezing or crackles. Good breath sounds. No accessory muscle use. No acute respiratory distress  Neuro: AOx3. GCS 15. Cranial nerves grossly intact. Moves all extremities equally. Follows commands. Responds appropriately   Derm: +3 pitting edema up to right knee, warm to touch, mildly tender.  Exquisitely tender medial knee.    Laboratory:  CBC:   Recent Labs   Lab 06/07/20  0521   WBC 9.85   RBC 3.71*   HGB 10.0*   HCT 31.6*   *   MCV 85   MCH 27.0   MCHC 31.6*     CMP:   Recent Labs   Lab 06/06/20 2141 06/07/20  0521   * 128*   CALCIUM 8.2* 7.5*   ALBUMIN 2.8*  --    PROT 7.0  --    * 134*   K 3.7 3.3*   CO2 19* 22*   CL 99 103   BUN 6 6   CREATININE 0.8 0.6   ALKPHOS 184*  --    ALT 20  --    AST 33  --    BILITOT 3.0*  --      Microbiology Results (last 7 days)     Procedure Component Value Units Date/Time    Blood Culture #2 **CANNOT BE ORDERED STAT** [[id number]] Collected:  06/06/20 2232    Order Status:  Completed Specimen:  Blood from Peripheral, Upper Arm, Right Updated:  06/07/20 1412     Blood Culture, Routine Gram stain luis felipe bottle: Gram positive cocci      Results called to and read back by:CECE Aldana 1E  06/07/2020        14:12 JT    Blood Culture #1 **CANNOT BE ORDERED STAT** [[id number]] Collected:  06/06/20 2142    Order Status:  Completed Specimen:  Blood from Peripheral, Upper Arm, Right Updated:  06/07/20 0517     Blood Culture, Routine No Growth to date          Diagnostic Results:  MRI Knee Without Contrast Right [[id number]] Collected: 06/07/20 0942   Order Status: Completed Updated: 06/07/20 1034   Narrative:     REASON: severe  swelling    TECHNIQUE: Multiplanar and multi sequential MRI of the right knee,  without IV contrast.    COMPARISON: The radiograph June 6, 2020.    FINDINGS:    The menisci are intact. The anterior and posterior cruciate ligaments  are intact. There is fluid superficial and deep to the MCL and LCL  with intact ligament fibers. The posterior lateral corner structures  are intact. The patellar retinaculum and extensor mechanism are  intact.    There is bone marrow hyperintensity of the patella. Multiple  full-thickness cartilage fissures identified with multiple subchondral  degenerative cysts. The patellofemoral articular cartilage is  preserved. There is subtle bone marrow hyperintensity of the lateral  proximal tibial metaphysis no other abnormal bone marrow signal  identified. The medial lateral compartment articular cartilage is  preserved.    A large knee joint effusion is identified, with some thickening of the  synovium. There is marked subcutaneous and deep soft tissue edema  about the knee.    There is hyperintensity of the gastrocnemius muscles, medial head more  so than lateral head. There is fluid deep to the medial head  gastrocnemius muscle. A Baker's cyst is noted.    IMPRESSION:    1.  Large knee joint effusion identified with mild synovial  thickening. This finding is usually seen with inflammatory  arthropathies and not in the posttraumatic setting.  2.  Severe subcutaneous and deep soft tissue edema about the knee.  3.  Hyperintensity of the gastrocnemius muscles, medial head more  so than lateral head is felt to reflect muscle strain.  4.  There is large amount of fluid along the deep anterior margin  of the medial head gastrocnemius muscle, possibly reflecting  hemorrhagic products.  5.  Mild bone marrow edema/contusion of the lateral proximal  metaphysis of the tibia.  6.  Multiple focal full-thickness cartilage fissures of the  patellar cartilage with subchondral degenerative cyst.  7.  Baker's  cyst noted.  8.  Mild MCL and LCL sprains.         ASSESSMENT/PLAN:     Active Hospital Problems    Diagnosis    *Cellulitis of right lower extremity    Polysubstance abuse    Sepsis    Diabetic foot ulcer    Cigarette nicotine dependence without complication    Type 2 diabetes mellitus with hyperglycemia, with long-term current use of insulin    Thrombocytopenia         Plan:   Cellulitis of right lower extremity  Sepsis resolved  Negative for DVT  IV clindamycin and IV Levaquin  Daily CRP  Improving  Continue medications  ISS  Blood Cx G+ cocci 1/2 bottles  Pain control  Case discussed with orthopedic surgeon, Dr. Winkler.  Low suspicion for septic joint.   Unable to obtain MRI of the foot.  Will plan for tomorrow    Consult orthopedic surgery  Consult podiatry  Appreciate consult    VTE Risk Mitigation (From admission, onward)         Ordered     enoxaparin injection 40 mg  Daily      06/06/20 2319     IP VTE LOW RISK PATIENT  Once      06/06/20 2319     Place sequential compression device  Until discontinued      06/06/20 2319                  Department Hospital Medicine  [facility]  Remigio Stock MD  Date of service: 06/07/2020

## 2020-06-07 NOTE — PLAN OF CARE
Problem: Wound  Goal: Optimal Wound Healing  Outcome: Ongoing, Progressing     Problem: Adult Inpatient Plan of Care  Goal: Plan of Care Review  Outcome: Ongoing, Progressing  Goal: Patient-Specific Goal (Individualization)  Outcome: Ongoing, Progressing  Goal: Absence of Hospital-Acquired Illness or Injury  Outcome: Ongoing, Progressing  Goal: Optimal Comfort and Wellbeing  Outcome: Ongoing, Progressing  Goal: Readiness for Transition of Care  Outcome: Ongoing, Progressing  Goal: Rounds/Family Conference  Outcome: Ongoing, Progressing     Problem: Diabetes Comorbidity  Goal: Blood Glucose Level Within Desired Range  Outcome: Ongoing, Progressing     Problem: Adjustment to Illness (Sepsis/Septic Shock)  Goal: Optimal Coping  Outcome: Ongoing, Progressing     Problem: Bleeding (Sepsis/Septic Shock)  Goal: Absence of Bleeding  Outcome: Ongoing, Progressing     Problem: Glycemic Control Impaired (Sepsis/Septic Shock)  Goal: Blood Glucose Level Within Desired Range  Outcome: Ongoing, Progressing     Problem: Hemodynamic Instability (Sepsis/Septic Shock)  Goal: Effective Tissue Perfusion  Outcome: Ongoing, Progressing     Problem: Infection (Sepsis/Septic Shock)  Goal: Absence of Infection Signs/Symptoms  Outcome: Ongoing, Progressing     Problem: Nutrition Impaired (Sepsis/Septic Shock)  Goal: Optimal Nutrition Intake  Outcome: Ongoing, Progressing     Problem: Respiratory Compromise (Sepsis/Septic Shock)  Goal: Effective Oxygenation and Ventilation  Outcome: Ongoing, Progressing     Problem: Fall Injury Risk  Goal: Absence of Fall and Fall-Related Injury  Outcome: Ongoing, Progressing

## 2020-06-07 NOTE — PROGRESS NOTES
Right foot X-ray unremarkable. Awaiting MRI of right foot.     Local wound care consisting of santyl daily to right foot wound.     Orthopedic to evaluate patient for possible septic arthritis of right knee    Full consult note to follow
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
## 2020-06-07 NOTE — ASSESSMENT & PLAN NOTE
Hemoglobin A1c 6.5 about 2 months ago  Monitor blood glucose  Low-dose sliding scale insulin  Continue Lantus insulin

## 2020-06-07 NOTE — ASSESSMENT & PLAN NOTE
Due to cellulitis RLE  Lactic acid 4.6  BP stable  Blood cultures done  Received IV bolus in the ED  Vancomycin started in the ED  Follow blood cultures  Trend lactic acid

## 2020-06-07 NOTE — SUBJECTIVE & OBJECTIVE
Past Medical History:   Diagnosis Date    Diabetes mellitus        Past Surgical History:   Procedure Laterality Date    DEBRIDEMENT OF FOOT Bilateral 12/9/2019    Procedure: DEBRIDEMENT, FOOT;  Surgeon: Yesenia Buck DPM;  Location: [facility];  Service: Podiatry;  Laterality: Bilateral;       Review of patient's allergies indicates:  No Known Allergies    No current facility-administered medications on file prior to encounter.      Current Outpatient Medications on File Prior to Encounter   Medication Sig    insulin (BASAGLAR KWIKPEN U-100 INSULIN) glargine 100 units/mL (3mL) SubQ pen Inject 25 Units into the skin every evening.    [DISCONTINUED] propranolol (INDERAL) 20 MG tablet Take 20 mg by mouth 2 (two) times daily.    [DISCONTINUED] escitalopram oxalate (LEXAPRO) 10 MG tablet Take 10 mg by mouth once daily.    [DISCONTINUED] insulin  unit/mL injection Inject 15 Units into the skin 2 (two) times daily before meals.    [DISCONTINUED] lactulose (CHRONULAC) 10 gram/15 mL solution Take 20 g by mouth 2 (two) times daily.    [DISCONTINUED] MEN'S MULTI-VITAMIN ORAL Take 1 tablet by mouth once daily.     Family History     Problem Relation (Age of Onset)    Diabetes Mother    No Known Problems Father        Tobacco Use    Smoking status: Current Every Day Smoker     Packs/day: 0.50     Types: Cigarettes    Smokeless tobacco: Never Used   Substance and Sexual Activity    Alcohol use: Yes     Alcohol/week: 28.0 standard drinks     Types: 28 Cans of beer per week    Drug use: Yes    Sexual activity: Not on file     Review of Systems   All other systems reviewed and are negative.    Objective:     Vital Signs (Most Recent):  Temp: (!) 101.2 °F (38.4 °C) (06/06/20 2046)  Pulse: (!) 125 (06/06/20 2200)  Resp: 18 (06/06/20 2046)  BP: (!) 154/77 (06/06/20 2200)  SpO2: 100 % (06/06/20 2200) Vital Signs (24h Range):  Temp:  [101.2 °F (38.4 °C)] 101.2 °F (38.4 °C)  Pulse:  [121-125] 125  Resp:  [18] 18  SpO2:   [98 %-100 %] 100 %  BP: (154-160)/(77-87) 154/77     Weight: 77.1 kg (170 lb)  Body mass index is 22.43 kg/m².    Physical Exam   Constitutional: He is oriented to person, place, and time. He appears well-developed and well-nourished.   HENT:   Head: Normocephalic and atraumatic.   Eyes: Right eye exhibits no discharge. Left eye exhibits no discharge.   Neck: Neck supple.   Cardiovascular:   No murmur heard.  Tachycardic   Pulmonary/Chest: Effort normal and breath sounds normal. No stridor. No respiratory distress.   Abdominal: Soft. Bowel sounds are normal.   Genitourinary:   Genitourinary Comments: No CVA tenderness   Musculoskeletal:   Right lower leg edematous, tight, with warmth  Dry gangrene bottom right foot, see image   Neurological: He is alert and oriented to person, place, and time.   Skin: Skin is dry. Capillary refill takes less than 2 seconds. He is not diaphoretic.   Psychiatric: He has a normal mood and affect.   Vitals reviewed.          Significant Labs:   CBC:   Recent Labs   Lab 06/06/20  2141   WBC 9.11   HGB 10.4*   HCT 32.7*   *     CMP:   Recent Labs   Lab 06/06/20  2141   *   K 3.7   CL 99   CO2 19*   *   BUN 6   CREATININE 0.8   CALCIUM 8.2*   PROT 7.0   ALBUMIN 2.8*   BILITOT 3.0*   ALKPHOS 184*   AST 33   ALT 20   ANIONGAP 13   EGFRNONAA >60.0     Lactic Acid:   Recent Labs   Lab 06/06/20  2141   LACTATE 4.6*     Urine Studies:   Recent Labs   Lab 06/06/20 2142   COLORU Yellow   APPEARANCEUA Clear   PHUR 6.0   SPECGRAV 1.025   PROTEINUA Trace*   GLUCUA 4+*   KETONESU 1+*   BILIRUBINUA Negative   OCCULTUA 1+*   NITRITE Negative   UROBILINOGEN 2.0-3.0*   LEUKOCYTESUR Negative   RBCUA 6*   WBCUA 2   BACTERIA Negative   SQUAMEPITHEL 1   HYALINECASTS 5*       Significant Imaging: I have reviewed all pertinent imaging results/findings within the past 24 hours.
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
## 2020-06-07 NOTE — NURSING
Arrived in the unit per stretcher. Reports RLE discomfort; pt received pain medication in ED. Able to transfer from stretcher to bed per self, avoiding weight to RLE.

## 2020-06-07 NOTE — NURSING
"Pt called requesting accucheck. States, "I think my sugar dropped." Accucheck performed per Jessica Dyer RN resulted 146. Pt states, "I started to sweat so I thought my sugar was low." Denied any intolerable pain or discomfort.   "

## 2020-06-08 ENCOUNTER — CLINICAL SUPPORT (OUTPATIENT)
Dept: CARDIOLOGY | Facility: HOSPITAL | Age: 40
DRG: 872 | End: 2020-06-08
Attending: FAMILY MEDICINE
Payer: MEDICAID

## 2020-06-08 PROBLEM — B95.62 MRSA BACTEREMIA: Status: ACTIVE | Noted: 2020-06-08

## 2020-06-08 PROBLEM — E11.65 TYPE 2 DIABETES MELLITUS WITH HYPERGLYCEMIA, WITH LONG-TERM CURRENT USE OF INSULIN: Chronic | Status: ACTIVE | Noted: 2019-08-28

## 2020-06-08 PROBLEM — R78.81 MRSA BACTEREMIA: Status: ACTIVE | Noted: 2020-06-08

## 2020-06-08 PROBLEM — Z79.4 TYPE 2 DIABETES MELLITUS WITH HYPERGLYCEMIA, WITH LONG-TERM CURRENT USE OF INSULIN: Chronic | Status: ACTIVE | Noted: 2019-08-28

## 2020-06-08 LAB
ANION GAP SERPL CALC-SCNC: 8 MMOL/L (ref 8–16)
AORTIC ROOT ANNULUS: 2.58 CM
AORTIC VALVE CUSP SEPERATION: 1.99 CM
AV INDEX (PROSTH): 0.95
AV MEAN GRADIENT: 7 MMHG
AV PEAK GRADIENT: 12 MMHG
AV VALVE AREA: 3.01 CM2
AV VELOCITY RATIO: 86.56
BASOPHILS # BLD AUTO: 0.04 K/UL (ref 0–0.2)
BASOPHILS NFR BLD: 0.5 % (ref 0–1.9)
BSA FOR ECHO PROCEDURE: 2.03 M2
BUN SERPL-MCNC: 9 MG/DL (ref 6–20)
CALCIUM SERPL-MCNC: 8.2 MG/DL (ref 8.7–10.5)
CHLORIDE SERPL-SCNC: 105 MMOL/L (ref 95–110)
CO2 SERPL-SCNC: 21 MMOL/L (ref 23–29)
CREAT SERPL-MCNC: 0.8 MG/DL (ref 0.5–1.4)
CRP SERPL-MCNC: 9.41 MG/DL
CV ECHO LV RWT: 0.41 CM
DIFFERENTIAL METHOD: ABNORMAL
DOP CALC AO PEAK VEL: 1.7 M/S
DOP CALC AO VTI: 32.19 CM
DOP CALC LVOT AREA: 3.2 CM2
DOP CALC LVOT DIAMETER: 2.01 CM
DOP CALC LVOT PEAK VEL: 147.16 M/S
DOP CALC LVOT STROKE VOLUME: 97.05 CM3
DOP CALCLVOT PEAK VEL VTI: 30.6 CM
E WAVE DECELERATION TIME: 171.61 MSEC
E/A RATIO: 1.08
E/E' RATIO: 7.26 M/S
ECHO LV POSTERIOR WALL: 1 CM (ref 0.6–1.1)
EOSINOPHIL # BLD AUTO: 0.1 K/UL (ref 0–0.5)
EOSINOPHIL NFR BLD: 1.1 % (ref 0–8)
ERYTHROCYTE [DISTWIDTH] IN BLOOD BY AUTOMATED COUNT: 15.9 % (ref 11.5–14.5)
EST. GFR  (AFRICAN AMERICAN): >60 ML/MIN/1.73 M^2
EST. GFR  (NON AFRICAN AMERICAN): >60 ML/MIN/1.73 M^2
FRACTIONAL SHORTENING: 31 % (ref 28–44)
GLUCOSE SERPL-MCNC: 106 MG/DL (ref 70–110)
GLUCOSE SERPL-MCNC: 145 MG/DL (ref 70–110)
GLUCOSE SERPL-MCNC: 168 MG/DL (ref 70–110)
GLUCOSE SERPL-MCNC: 172 MG/DL (ref 70–110)
GLUCOSE SERPL-MCNC: 174 MG/DL (ref 70–110)
HCT VFR BLD AUTO: 30.2 % (ref 40–54)
HGB BLD-MCNC: 9.7 G/DL (ref 14–18)
IMM GRANULOCYTES # BLD AUTO: 0.03 K/UL (ref 0–0.04)
IMM GRANULOCYTES NFR BLD AUTO: 0.3 % (ref 0–0.5)
INTERVENTRICULAR SEPTUM: 1 CM (ref 0.6–1.1)
LEFT ATRIUM SIZE: 2.71 CM
LEFT INTERNAL DIMENSION IN SYSTOLE: 3.33 CM (ref 2.1–4)
LEFT VENTRICLE MASS INDEX: 84 G/M2
LEFT VENTRICULAR INTERNAL DIMENSION IN DIASTOLE: 4.82 CM (ref 3.5–6)
LEFT VENTRICULAR MASS: 171.36 G
LV LATERAL E/E' RATIO: 6.53 M/S
LV SEPTAL E/E' RATIO: 8.17 M/S
LYMPHOCYTES # BLD AUTO: 1.9 K/UL (ref 1–4.8)
LYMPHOCYTES NFR BLD: 21.4 % (ref 18–48)
MAGNESIUM SERPL-MCNC: 1.5 MG/DL (ref 1.6–2.6)
MCH RBC QN AUTO: 27.5 PG (ref 27–31)
MCHC RBC AUTO-ENTMCNC: 32.1 G/DL (ref 32–36)
MCV RBC AUTO: 86 FL (ref 82–98)
MONOCYTES # BLD AUTO: 1.1 K/UL (ref 0.3–1)
MONOCYTES NFR BLD: 11.9 % (ref 4–15)
MV PEAK A VEL: 0.91 M/S
MV PEAK E VEL: 0.98 M/S
NEUTROPHILS # BLD AUTO: 5.7 K/UL (ref 1.8–7.7)
NEUTROPHILS NFR BLD: 64.8 % (ref 38–73)
NRBC BLD-RTO: 0 /100 WBC
PISA TR MAX VEL: 2.46 M/S
PLATELET # BLD AUTO: 120 K/UL (ref 150–350)
PMV BLD AUTO: 11 FL (ref 9.2–12.9)
POTASSIUM SERPL-SCNC: 3.5 MMOL/L (ref 3.5–5.1)
PV PEAK VELOCITY: 105.6 CM/S
RA PRESSURE: 3 MMHG
RBC # BLD AUTO: 3.53 M/UL (ref 4.6–6.2)
SODIUM SERPL-SCNC: 134 MMOL/L (ref 136–145)
TDI LATERAL: 0.15 M/S
TDI SEPTAL: 0.12 M/S
TDI: 0.14 M/S
TR MAX PG: 24 MMHG
TV REST PULMONARY ARTERY PRESSURE: 27 MMHG
WBC # BLD AUTO: 8.84 K/UL (ref 3.9–12.7)

## 2020-06-08 PROCEDURE — 36415 COLL VENOUS BLD VENIPUNCTURE: CPT

## 2020-06-08 PROCEDURE — 25000003 PHARM REV CODE 250: Performed by: NURSE PRACTITIONER

## 2020-06-08 PROCEDURE — 93306 TTE W/DOPPLER COMPLETE: CPT

## 2020-06-08 PROCEDURE — 63600175 PHARM REV CODE 636 W HCPCS: Performed by: NURSE PRACTITIONER

## 2020-06-08 PROCEDURE — 25000003 PHARM REV CODE 250: Performed by: INTERNAL MEDICINE

## 2020-06-08 PROCEDURE — 25000003 PHARM REV CODE 250: Performed by: FAMILY MEDICINE

## 2020-06-08 PROCEDURE — 99232 PR SUBSEQUENT HOSPITAL CARE,LEVL II: ICD-10-PCS | Mod: ,,, | Performed by: PODIATRIST

## 2020-06-08 PROCEDURE — 63600175 PHARM REV CODE 636 W HCPCS: Performed by: FAMILY MEDICINE

## 2020-06-08 PROCEDURE — 85025 COMPLETE CBC W/AUTO DIFF WBC: CPT

## 2020-06-08 PROCEDURE — 87040 BLOOD CULTURE FOR BACTERIA: CPT

## 2020-06-08 PROCEDURE — 99232 SBSQ HOSP IP/OBS MODERATE 35: CPT | Mod: ,,, | Performed by: PODIATRIST

## 2020-06-08 PROCEDURE — 80048 BASIC METABOLIC PNL TOTAL CA: CPT

## 2020-06-08 PROCEDURE — 86140 C-REACTIVE PROTEIN: CPT

## 2020-06-08 PROCEDURE — 63600175 PHARM REV CODE 636 W HCPCS: Performed by: INTERNAL MEDICINE

## 2020-06-08 PROCEDURE — 83735 ASSAY OF MAGNESIUM: CPT

## 2020-06-08 PROCEDURE — 12000002 HC ACUTE/MED SURGE SEMI-PRIVATE ROOM

## 2020-06-08 RX ADMIN — IBUPROFEN 400 MG: 400 TABLET ORAL at 06:06

## 2020-06-08 RX ADMIN — VANCOMYCIN HYDROCHLORIDE 1500 MG: 1 INJECTION, POWDER, LYOPHILIZED, FOR SOLUTION INTRAVENOUS at 03:06

## 2020-06-08 RX ADMIN — MORPHINE SULFATE 2 MG: 2 INJECTION, SOLUTION INTRAMUSCULAR; INTRAVENOUS at 03:06

## 2020-06-08 RX ADMIN — HYDROCODONE BITARTRATE AND ACETAMINOPHEN 1 TABLET: 5; 325 TABLET ORAL at 01:06

## 2020-06-08 RX ADMIN — HYDROCODONE BITARTRATE AND ACETAMINOPHEN 1 TABLET: 5; 325 TABLET ORAL at 06:06

## 2020-06-08 RX ADMIN — HYDROCODONE BITARTRATE AND ACETAMINOPHEN 1 TABLET: 5; 325 TABLET ORAL at 11:06

## 2020-06-08 RX ADMIN — MAGNESIUM OXIDE 800 MG: 400 TABLET ORAL at 03:06

## 2020-06-08 RX ADMIN — INSULIN DETEMIR 25 UNITS: 100 INJECTION, SOLUTION SUBCUTANEOUS at 08:06

## 2020-06-08 RX ADMIN — MAGNESIUM OXIDE 800 MG: 400 TABLET ORAL at 08:06

## 2020-06-08 RX ADMIN — MORPHINE SULFATE 2 MG: 2 INJECTION, SOLUTION INTRAMUSCULAR; INTRAVENOUS at 08:06

## 2020-06-08 RX ADMIN — IBUPROFEN 400 MG: 400 TABLET ORAL at 08:06

## 2020-06-08 RX ADMIN — CLINDAMYCIN IN 5 PERCENT DEXTROSE 900 MG: 18 INJECTION, SOLUTION INTRAVENOUS at 01:06

## 2020-06-08 RX ADMIN — CLINDAMYCIN IN 5 PERCENT DEXTROSE 900 MG: 18 INJECTION, SOLUTION INTRAVENOUS at 03:06

## 2020-06-08 RX ADMIN — ACETAMINOPHEN 650 MG: 325 TABLET ORAL at 03:06

## 2020-06-08 RX ADMIN — LEVOFLOXACIN 500 MG: 500 INJECTION, SOLUTION INTRAVENOUS at 02:06

## 2020-06-08 NOTE — PLAN OF CARE
06/08/20 1546   Discharge Assessment   Assessment Type Discharge Planning Assessment   Confirmed/corrected address and phone number on facesheet? No   Assessment information obtained from? Patient   Communicated expected length of stay with patient/caregiver no   Prior to hospitilization cognitive status: Alert/Oriented   Prior to hospitalization functional status: Independent   Current cognitive status: Alert/Oriented   Current Functional Status: Independent   Facility Arrived From: home   Lives With significant other   Able to Return to Prior Arrangements yes   Is patient able to care for self after discharge? Yes   Patient's perception of discharge disposition home or selfcare   Readmission Within the Last 30 Days no previous admission in last 30 days   Patient currently being followed by outpatient case management? No   Patient currently receives any other outside agency services? No   Equipment Currently Used at Home none   Do you have any problems affording any of your prescribed medications? No   Is the patient taking medications as prescribed? yes   Does the patient have transportation home? Yes   Transportation Anticipated family or friend will provide   Does the patient receive services at the [facility]? No   Discharge Plan A Home   Discharge Plan B Home with family   DME Needed Upon Discharge  none   Patient/Family in Agreement with Plan yes   Introduced self to patient asked if ok to take a few min of their time for short interview for D/C planning and ok with patient

## 2020-06-08 NOTE — PROGRESS NOTES
Pharmacokinetic Initial Assessment: IV Vancomycin    Assessment/Plan:    Initiate intravenous vancomycin with loading dose of 1500 mg once followed by a maintenance dose of vancomycin 1250 mg IV every 12 hours  Desired empiric serum trough concentration is 15 to 20 mcg/mL  Draw vancomycin trough level 30 min prior to fourth dose on 06/10/20 at approximately 01:00   Pharmacy will continue to follow and monitor vancomycin.      Please contact pharmacy at extension [phone] with any questions regarding this assessment.     Thank you for the consult,   Cecille Villa       Patient brief summary:  Flako Quintana is a 39 y.o. male initiated on antimicrobial therapy with IV Vancomycin for treatment of suspected bacteremia    Drug Allergies:   Review of patient's allergies indicates:  No Known Allergies    Actual Body Weight:   79.7 kg    Renal Function:   Estimated Creatinine Clearance: 141.1 mL/min (based on SCr of 0.8 mg/dL).,       CBC (last 72 hours):  Recent Labs   Lab Result Units 06/06/20 2141 06/07/20 0521 06/08/20  0453   WBC K/uL 9.11 9.85 8.84   Hemoglobin g/dL 10.4* 10.0* 9.7*   Hematocrit % 32.7* 31.6* 30.2*   Platelets K/uL 145* 149* 120*   Gran% % 69.7 61.7 64.8   Lymph% % 17.3* 24.3 21.4   Mono% % 11.9 12.6 11.9   Eosinophil% % 0.1 0.6 1.1   Basophil% % 0.7 0.5 0.5   Differential Method  Automated Automated Automated       Metabolic Panel (last 72 hours):  Recent Labs   Lab Result Units 06/06/20 2141 06/06/20 2142 06/07/20 0521 06/08/20  0453   Sodium mmol/L 131*  --  134* 134*   Potassium mmol/L 3.7  --  3.3* 3.5   Chloride mmol/L 99  --  103 105   CO2 mmol/L 19*  --  22* 21*   Glucose mg/dL 231*  --  128* 106   Glucose, UA   --  4+*  --   --    BUN, Bld mg/dL 6  --  6 9   Creatinine mg/dL 0.8  --  0.6 0.8   Creatinine, Random Ur mg/dL  --  137.0  --   --    Albumin g/dL 2.8*  --   --   --    Total Bilirubin mg/dL 3.0*  --   --   --    Alkaline Phosphatase U/L 184*  --   --   --    AST U/L 33  --   --    --    ALT U/L 20  --   --   --    Magnesium mg/dL  --   --  1.1* 1.5*       Drug levels (last 3 results):  No results for input(s): VANCOMYCINRA, VANCOMYCINPE, VANCOMYCINTR in the last 72 hours.    Microbiologic Results:  Microbiology Results (last 7 days)       Procedure Component Value Units Date/Time    Blood culture [[id number]]     Order Status:  Sent Specimen:  Blood     Blood Culture #2 **CANNOT BE ORDERED STAT** [[id number]]  (Abnormal) Collected:  06/06/20 2232    Order Status:  Completed Specimen:  Blood from Peripheral, Upper Arm, Right Updated:  06/08/20 0907     Blood Culture, Routine Gram stain luis felipe bottle: Gram positive cocci      Results called to and read back by:CECE Aldana 1E  06/07/2020        14:12 JT      METHICILLIN RESISTANT STAPHYLOCOCCUS AUREUS  Susceptibility pending  Results called to and read back by:Leigh Powell LPN [id number]    06/08/2020  09:06 JBM      Blood Culture #1 **CANNOT BE ORDERED STAT** [[id number]] Collected:  06/06/20 2142    Order Status:  Completed Specimen:  Blood from Peripheral, Upper Arm, Right Updated:  06/07/20 2232     Blood Culture, Routine No Growth to date      No Growth to date

## 2020-06-08 NOTE — ASSESSMENT & PLAN NOTE
MRI of right foot ordered, xray of right foot was unremarkable.  If MRI is unremarkable, then patient can follow up outpatient for wound care.    Wound care consisting of daily dressing changes with Santyl to right foot wound.    Orthopedic has been consulted for concerns of possible septic arthritis of the knee joint/compartment syndrome/infected hematoma of the leg.
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
## 2020-06-08 NOTE — HOSPITAL COURSE
Patient was admitted for right lower extremity cellulitis with sepsis without shock.  Patient started on IV antibiotics.  There was concern for possible osteomyelitis however was negative on MRI.  Podiatry evaluated patient did not recommend further intervention.   Patient found to have MRSA bacteremia, echo ruled out vegetations, patient will be discharged to [facility] to continue the antibiotic  for total of 14 days.  Today is day 8 of Vanco.  ID consultanted,  prescription for outpatient abx sent  Pt feels better, r/leg almost resolved,  MRI lumbar spine and right foot negative osteomyelitis,  consult podiatrist appreciated  no drainable abscess, recommends local wound care, CRP improving, on day 8 Vanco, repeat blood culture negative to date.    Instructions provided to follow up with primary care physician as outpatient. Patient verbalized understanding and is aware to contact primary care physician or return to ED if new or worsening symptoms.    Physical exam on the day of discharge:  General: Patient resting comfortably in no acute distress.  Lungs: CTA. Good air entry.  Cor: Regular rate and rhythm. No murmurs. No pedal edema.  Abd: Soft. Nontender. Non-distended.  Neuro: A&O x3. Moving all 4 extremities equally  Ext: No clubbing. No cyanosis.   generalized edema of the RLE. No evidence of septic arthritis. He has tenderness right L4-5 musculature on right.   Plantar area wound covered with dressing.

## 2020-06-08 NOTE — PROGRESS NOTES
[facility]  Progress Note    Patient name: Flako Quintana  MRN: [id number]  Admit Date: 6/6/2020   LOS: 2 days     SUBJECTIVE:     Principal problem: Cellulitis of right lower extremity     Interval History:   Suspected fever on IV antibiotics.  Changed IV antibiotics, now vancomycin.  Blood cultures grew MRSA.  Repeat blood cultures ordered.  Echo ordered.  ID consult.  Orthopedic surgery will see patient today.  Discussed case with Podiatry.  Foot appears stable from Podiatry standpoint.  Will likely sign off unless MRI ft abnormal.  Reports feeling okay.  Pain improving today.  Continues to be on some pain medications.  CRP trending down.  Afebrile since hospital admission.  Currently on IV antibiotics.  Daily CRP ordered.  Likely acidosis resolved.  Patient had MRI of the knee that found joint effusion likely inflammatory.  Significant amount of edema.  Discussed with Orthopedic surgery, less likelihood of septic arthritis.  Patient reports that pain has improved.  Continues to have some pain however.    Scheduled Meds:   ibuprofen  400 mg Oral TID    insulin detemir U-100  25 Units Subcutaneous QHS    levoFLOXacin  500 mg Intravenous Q24H    vancomycin (VANCOCIN) IVPB  1,500 mg Intravenous Once     Continuous Infusions:  PRN Meds:acetaminophen, acetaminophen, dextrose 50%, dextrose 50%, glucagon (human recombinant), glucose, glucose, hydrALAZINE, HYDROcodone-acetaminophen, insulin aspart U-100, labetalol, magnesium oxide, magnesium oxide, melatonin, morphine, ondansetron, potassium chloride 10%, potassium chloride 10%, potassium chloride 10%, potassium, sodium phosphates, potassium, sodium phosphates, potassium, sodium phosphates, promethazine (PHENERGAN) IVPB, sodium chloride 0.9%, Pharmacy to dose Vancomycin consult **AND** vancomycin - pharmacy to dose    Review of patient's allergies indicates:  No Known Allergies    Review of Systems  As per subjective    OBJECTIVE:      Vital Signs (Most Recent)  Temp: (!) 100.5 °F (38.1 °C) (06/08/20 1100)  Pulse: (!) 111 (06/08/20 1100)  Resp: 17 (06/08/20 1100)  BP: (!) 144/81 (06/08/20 1100)  SpO2: 97 % (06/08/20 1100)    Vital Signs Range (Last 24H):  Temp:  [98.8 °F (37.1 °C)-100.7 °F (38.2 °C)]   Pulse:  [104-116]   Resp:  [16-19]   BP: (137-157)/(80-93)   SpO2:  [97 %-100 %]     I & O (Last 24H):    Intake/Output Summary (Last 24 hours) at 6/8/2020 1311  Last data filed at 6/8/2020 0349  Gross per 24 hour   Intake 720 ml   Output 2000 ml   Net -1280 ml       Physical Exam:  General: Patient resting comfortably in no acute distress. Appears as stated age. Calm  Eyes: EOM intact. No conjunctivae injection. No scleral icterus.  ENT: Hearing grossly intact. No discharge from ears. No nasal discharge.   CVS: RRR.   Lungs: CTA BL, no wheezing or crackles. Good breath sounds. No accessory muscle use. No acute respiratory distress  Neuro: AOx3. GCS 15. Cranial nerves grossly intact. Moves all extremities equally. Follows commands. Responds appropriately   Derm: +2 pitting edema up to right knee, 2+ up to ankle, warm to touch, mildly tender.     Laboratory:  CBC:   Recent Labs   Lab 06/08/20  0453   WBC 8.84   RBC 3.53*   HGB 9.7*   HCT 30.2*   *   MCV 86   MCH 27.5   MCHC 32.1     CMP:   Recent Labs   Lab 06/06/20  2141  06/08/20  0453   *   < > 106   CALCIUM 8.2*   < > 8.2*   ALBUMIN 2.8*  --   --    PROT 7.0  --   --    *   < > 134*   K 3.7   < > 3.5   CO2 19*   < > 21*   CL 99   < > 105   BUN 6   < > 9   CREATININE 0.8   < > 0.8   ALKPHOS 184*  --   --    ALT 20  --   --    AST 33  --   --    BILITOT 3.0*  --   --     < > = values in this interval not displayed.     Microbiology Results (last 7 days)     Procedure Component Value Units Date/Time    Blood culture [[id number]]     Order Status:  Sent Specimen:  Blood     Blood Culture #2 **CANNOT BE ORDERED STAT** [[id number]]  (Abnormal) Collected:  06/06/20 5012    Order  Status:  Completed Specimen:  Blood from Peripheral, Upper Arm, Right Updated:  06/08/20 0907     Blood Culture, Routine Gram stain luis felipe bottle: Gram positive cocci      Results called to and read back by:CECE Aldana 1E  06/07/2020        14:12 JT      METHICILLIN RESISTANT STAPHYLOCOCCUS AUREUS  Susceptibility pending  Results called to and read back by:Leigh Powell LPN [id number]    06/08/2020  09:06 JBM      Blood Culture #1 **CANNOT BE ORDERED STAT** [[id number]] Collected:  06/06/20 2142    Order Status:  Completed Specimen:  Blood from Peripheral, Upper Arm, Right Updated:  06/07/20 2232     Blood Culture, Routine No Growth to date      No Growth to date          Diagnostic Results:                      ASSESSMENT/PLAN:     Active Hospital Problems    Diagnosis    *Cellulitis of right lower extremity    MRSA bacteremia    Polysubstance abuse    Sepsis    Diabetic foot ulcer    Cigarette nicotine dependence without complication    Type 2 diabetes mellitus with hyperglycemia, with long-term current use of insulin    Thrombocytopenia         Plan:   Cellulitis of right lower extremity with MRSA bacteremia  Sepsis resolved  Negative for DVT  Prev IV clindamycin. Now IV vanco and IV Levaquin  Daily CRP  Continue medications. ISS  Repeat BCx ordered  Pain control  Case discussed with orthopedic surgeon, Dr. Winkler.  Low suspicion for septic joint.   MRI foot planned today  Echo pending    Consult infectious disease  Consult orthopedic surgery  Consult podiatry  Appreciate consult    Hold Lovenox in the setting of possible hematoma on MRI    VTE Risk Mitigation (From admission, onward)         Ordered     IP VTE LOW RISK PATIENT  Once      06/06/20 2319     Place sequential compression device  Until discontinued      06/06/20 2319                  Department Hospital Medicine  [facility]  Remigio Stock MD  Date of service: 06/08/2020

## 2020-06-08 NOTE — SUBJECTIVE & OBJECTIVE
Scheduled Meds:   clindamycin (CLEOCIN) IVPB  900 mg Intravenous Q8H    enoxparin  40 mg Subcutaneous Daily    ibuprofen  400 mg Oral TID    insulin detemir U-100  25 Units Subcutaneous QHS    levoFLOXacin  500 mg Intravenous Q24H     Continuous Infusions:  PRN Meds:acetaminophen, acetaminophen, dextrose 50%, dextrose 50%, glucagon (human recombinant), glucose, glucose, hydrALAZINE, HYDROcodone-acetaminophen, insulin aspart U-100, labetalol, magnesium oxide, magnesium oxide, melatonin, morphine, ondansetron, potassium chloride 10%, potassium chloride 10%, potassium chloride 10%, potassium, sodium phosphates, potassium, sodium phosphates, potassium, sodium phosphates, promethazine (PHENERGAN) IVPB, sodium chloride 0.9%    Review of patient's allergies indicates:  No Known Allergies     Past Medical History:   Diagnosis Date    Diabetes mellitus     Hypertension      Past Surgical History:   Procedure Laterality Date    DEBRIDEMENT OF FOOT Bilateral 12/9/2019    Procedure: DEBRIDEMENT, FOOT;  Surgeon: Yesenia Buck DPM;  Location: [facility];  Service: Podiatry;  Laterality: Bilateral;       Family History     Problem Relation (Age of Onset)    Alcohol abuse Maternal Uncle    COPD Mother    Cancer Sister    Diabetes Mother, Brother    No Known Problems Father        Tobacco Use    Smoking status: Current Every Day Smoker     Packs/day: 0.30     Types: Cigarettes    Smokeless tobacco: Never Used   Substance and Sexual Activity    Alcohol use: Yes     Alcohol/week: 6.0 standard drinks     Types: 6 Cans of beer per week    Drug use: Yes    Sexual activity: Yes     Review of Systems  Objective:     Vital Signs (Most Recent):  Temp: (!) 100.5 °F (38.1 °C) (06/08/20 1100)  Pulse: (!) 111 (06/08/20 1100)  Resp: 17 (06/08/20 1100)  BP: (!) 144/81 (06/08/20 1100)  SpO2: 97 % (06/08/20 1100) Vital Signs (24h Range):  Temp:  [98.8 °F (37.1 °C)-100.7 °F (38.2 °C)] 100.5 °F (38.1 °C)  Pulse:  [101-116] 111  Resp:   [16-19] 17  SpO2:  [97 %-100 %] 97 %  BP: (137-157)/(80-93) 144/81     Weight: 79.7 kg (175 lb 12 oz)  Body mass index is 23.19 kg/m².    Foot Exam   Wound probes to fascia tissue.  No drainage.  No palpable areas of fluctuance or crepitus.  Hyperkeratotic border.  Minimal amount of surrounding erythema.    Erythema and edema from right knee all the way to ft present.  Severe tenderness and pain located in right knee, lower leg, and surrounding area.  I      Laboratory:  All pertinent labs reviewed within the last 24 hours.    Diagnostic Results:  I have reviewed all pertinent imaging results/findings within the past 24 hours.
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
## 2020-06-08 NOTE — NURSING
"T=100.7 PRN Tylenol given. Pt states, "I do feel hot." Electrolyte replacement administration continued.  "

## 2020-06-08 NOTE — NURSING
Noted pt K=3.3 Mg= 1.1. Educated pt on importance of maintaining normal level of K & Mg. Voiced back understanding. Appropriate electrolyte replacement initiated as previousy ordered. Pt requested non-liquid PO for of potassium replacement. MD Castillo notified and new order received.

## 2020-06-09 LAB
ANION GAP SERPL CALC-SCNC: 8 MMOL/L (ref 8–16)
BACTERIA BLD CULT: ABNORMAL
BASOPHILS # BLD AUTO: 0.02 K/UL (ref 0–0.2)
BASOPHILS NFR BLD: 0.4 % (ref 0–1.9)
BUN SERPL-MCNC: 7 MG/DL (ref 6–20)
CALCIUM SERPL-MCNC: 8 MG/DL (ref 8.7–10.5)
CHLORIDE SERPL-SCNC: 107 MMOL/L (ref 95–110)
CO2 SERPL-SCNC: 22 MMOL/L (ref 23–29)
CREAT SERPL-MCNC: 0.6 MG/DL (ref 0.5–1.4)
CRP SERPL-MCNC: 7.18 MG/DL
DIFFERENTIAL METHOD: ABNORMAL
EOSINOPHIL # BLD AUTO: 0.1 K/UL (ref 0–0.5)
EOSINOPHIL NFR BLD: 2.2 % (ref 0–8)
ERYTHROCYTE [DISTWIDTH] IN BLOOD BY AUTOMATED COUNT: 16.1 % (ref 11.5–14.5)
EST. GFR  (AFRICAN AMERICAN): >60 ML/MIN/1.73 M^2
EST. GFR  (NON AFRICAN AMERICAN): >60 ML/MIN/1.73 M^2
GLUCOSE SERPL-MCNC: 159 MG/DL (ref 70–110)
GLUCOSE SERPL-MCNC: 161 MG/DL (ref 70–110)
GLUCOSE SERPL-MCNC: 170 MG/DL (ref 70–110)
GLUCOSE SERPL-MCNC: 171 MG/DL (ref 70–110)
GLUCOSE SERPL-MCNC: 178 MG/DL (ref 70–110)
HCT VFR BLD AUTO: 32.4 % (ref 40–54)
HGB BLD-MCNC: 10.2 G/DL (ref 14–18)
IMM GRANULOCYTES # BLD AUTO: 0.02 K/UL (ref 0–0.04)
IMM GRANULOCYTES NFR BLD AUTO: 0.4 % (ref 0–0.5)
LYMPHOCYTES # BLD AUTO: 0.9 K/UL (ref 1–4.8)
LYMPHOCYTES NFR BLD: 17.6 % (ref 18–48)
MAGNESIUM SERPL-MCNC: 1.6 MG/DL (ref 1.6–2.6)
MCH RBC QN AUTO: 26.8 PG (ref 27–31)
MCHC RBC AUTO-ENTMCNC: 31.5 G/DL (ref 32–36)
MCV RBC AUTO: 85 FL (ref 82–98)
MONOCYTES # BLD AUTO: 0.6 K/UL (ref 0.3–1)
MONOCYTES NFR BLD: 12.1 % (ref 4–15)
NEUTROPHILS # BLD AUTO: 3.3 K/UL (ref 1.8–7.7)
NEUTROPHILS NFR BLD: 67.3 % (ref 38–73)
NRBC BLD-RTO: 0 /100 WBC
PLATELET # BLD AUTO: 106 K/UL (ref 150–350)
PMV BLD AUTO: 12.1 FL (ref 9.2–12.9)
POTASSIUM SERPL-SCNC: 3.4 MMOL/L (ref 3.5–5.1)
RBC # BLD AUTO: 3.81 M/UL (ref 4.6–6.2)
SODIUM SERPL-SCNC: 137 MMOL/L (ref 136–145)
WBC # BLD AUTO: 4.94 K/UL (ref 3.9–12.7)

## 2020-06-09 PROCEDURE — 25500020 PHARM REV CODE 255: Performed by: INTERNAL MEDICINE

## 2020-06-09 PROCEDURE — 12000002 HC ACUTE/MED SURGE SEMI-PRIVATE ROOM

## 2020-06-09 PROCEDURE — 63600175 PHARM REV CODE 636 W HCPCS: Performed by: FAMILY MEDICINE

## 2020-06-09 PROCEDURE — 80048 BASIC METABOLIC PNL TOTAL CA: CPT

## 2020-06-09 PROCEDURE — 25000003 PHARM REV CODE 250: Performed by: INTERNAL MEDICINE

## 2020-06-09 PROCEDURE — A9585 GADOBUTROL INJECTION: HCPCS | Performed by: INTERNAL MEDICINE

## 2020-06-09 PROCEDURE — 25000003 PHARM REV CODE 250: Performed by: FAMILY MEDICINE

## 2020-06-09 PROCEDURE — 63600175 PHARM REV CODE 636 W HCPCS: Performed by: NURSE PRACTITIONER

## 2020-06-09 PROCEDURE — 85025 COMPLETE CBC W/AUTO DIFF WBC: CPT

## 2020-06-09 PROCEDURE — 83735 ASSAY OF MAGNESIUM: CPT

## 2020-06-09 PROCEDURE — 25000003 PHARM REV CODE 250: Performed by: NURSE PRACTITIONER

## 2020-06-09 PROCEDURE — 86140 C-REACTIVE PROTEIN: CPT

## 2020-06-09 PROCEDURE — 36415 COLL VENOUS BLD VENIPUNCTURE: CPT

## 2020-06-09 PROCEDURE — 83036 HEMOGLOBIN GLYCOSYLATED A1C: CPT

## 2020-06-09 PROCEDURE — 82962 GLUCOSE BLOOD TEST: CPT

## 2020-06-09 PROCEDURE — 63600175 PHARM REV CODE 636 W HCPCS: Performed by: INTERNAL MEDICINE

## 2020-06-09 RX ORDER — GADOBUTROL 604.72 MG/ML
7.5 INJECTION INTRAVENOUS
Status: COMPLETED | OUTPATIENT
Start: 2020-06-09 | End: 2020-06-09

## 2020-06-09 RX ORDER — FAMOTIDINE 20 MG/1
20 TABLET, FILM COATED ORAL 2 TIMES DAILY
Status: DISCONTINUED | OUTPATIENT
Start: 2020-06-09 | End: 2020-06-13 | Stop reason: HOSPADM

## 2020-06-09 RX ADMIN — VANCOMYCIN HYDROCHLORIDE 1250 MG: 1 INJECTION, POWDER, LYOPHILIZED, FOR SOLUTION INTRAVENOUS at 03:06

## 2020-06-09 RX ADMIN — HYDROCODONE BITARTRATE AND ACETAMINOPHEN 1 TABLET: 5; 325 TABLET ORAL at 07:06

## 2020-06-09 RX ADMIN — IBUPROFEN 400 MG: 400 TABLET ORAL at 09:06

## 2020-06-09 RX ADMIN — MORPHINE SULFATE 2 MG: 2 INJECTION, SOLUTION INTRAMUSCULAR; INTRAVENOUS at 08:06

## 2020-06-09 RX ADMIN — FAMOTIDINE 20 MG: 20 TABLET, FILM COATED ORAL at 09:06

## 2020-06-09 RX ADMIN — MORPHINE SULFATE 2 MG: 2 INJECTION, SOLUTION INTRAMUSCULAR; INTRAVENOUS at 12:06

## 2020-06-09 RX ADMIN — HYDROCODONE BITARTRATE AND ACETAMINOPHEN 1 TABLET: 5; 325 TABLET ORAL at 11:06

## 2020-06-09 RX ADMIN — INSULIN DETEMIR 25 UNITS: 100 INJECTION, SOLUTION SUBCUTANEOUS at 09:06

## 2020-06-09 RX ADMIN — HYDROCODONE BITARTRATE AND ACETAMINOPHEN 1 TABLET: 5; 325 TABLET ORAL at 06:06

## 2020-06-09 RX ADMIN — MORPHINE SULFATE 2 MG: 2 INJECTION, SOLUTION INTRAMUSCULAR; INTRAVENOUS at 09:06

## 2020-06-09 RX ADMIN — MORPHINE SULFATE 2 MG: 2 INJECTION, SOLUTION INTRAMUSCULAR; INTRAVENOUS at 04:06

## 2020-06-09 RX ADMIN — VANCOMYCIN HYDROCHLORIDE 1250 MG: 1 INJECTION, POWDER, LYOPHILIZED, FOR SOLUTION INTRAVENOUS at 02:06

## 2020-06-09 RX ADMIN — GADOBUTROL 7.5 ML: 604.72 INJECTION INTRAVENOUS at 05:06

## 2020-06-09 RX ADMIN — IBUPROFEN 400 MG: 400 TABLET ORAL at 03:06

## 2020-06-09 RX ADMIN — LEVOFLOXACIN 500 MG: 500 INJECTION, SOLUTION INTRAVENOUS at 01:06
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
## 2020-06-09 NOTE — PROGRESS NOTES
06/09/20 1140 06/09/20 1149        Wound 12/06/19 0447 Diabetic Ulcer Plantar #2   Date First Assessed/Time First Assessed: 12/06/19 0447   Primary Wound Type: Diabetic Ulcer  Side: Right  Location: Plantar  Wound/PI Number (optional): #2   Wound Image  --      Dressing Appearance  --  Intact   Drainage Amount  --  Small  (dry on dressing, no drainage expressed)   Appearance  --  Pink   Tissue loss description  --  Full thickness   Periwound Area  --  Swelling   Wound Edges  --  Callused   Wound Length (cm)  --  1 cm   Wound Width (cm)  --  1 cm   Wound Depth (cm)  --  1 cm   Wound Volume (cm^3)  --  1 cm^3   Wound Surface Area (cm^2)  --  1 cm^2   Care  --  Cleansed with:;Wound cleanser   Dressing  --  Gauze;Rolled gauze   Packing  --  Incision packed with  (aquacel ag)   Packing Inserted   --  1  (cm)        Wound 06/09/20 1140  Right medial Leg   Date First Assessed/Time First Assessed: 06/09/20 1140   Pre-existing: Yes  Primary Wound Type: (c)   Side: Right  Orientation: medial  Location: Leg   Wound Image   --    Dressing Appearance Open to air  --    Drainage Amount None  --    Appearance Dry  (scab)  --    Periwound Area Swelling  --    Wound Length (cm)   (no open area, pink scar distal to scabbed area)  --    Care Cleansed with:;Wound cleanser  --    Dressing   (bandaid to cover)  --    spoke with Dr Buck regarding packing.

## 2020-06-09 NOTE — CONSULTS
Orthopaedic Surgery History and Physical     History of present illness:   Flako Quintana is a 39 y.o. male who presents with lower extremity cellulitis that has been improving with IV abx.  Consult placed for evaluation for compartment syndrome.      Allergies:   Review of patient's allergies indicates:  No Known Allergies    Past medical history:   Past Medical History:   Diagnosis Date    Diabetes mellitus     Hypertension        Past surgical history:  Past Surgical History:   Procedure Laterality Date    DEBRIDEMENT OF FOOT Bilateral 12/9/2019    Procedure: DEBRIDEMENT, FOOT;  Surgeon: Yesenia Buck DPM;  [facility];  Service: Podiatry;  Laterality: Bilateral;       Social history:   Reviewed per EPIC history for tobacco or alcohol use     Medications:    Current Facility-Administered Medications:     acetaminophen tablet 650 mg, 650 mg, Oral, Q8H PRN, [place] P. Tavon, APRN, 650 mg at 06/07/20 2343    acetaminophen tablet 650 mg, 650 mg, Oral, Q4H PRN, [place] P. Tavon, APRN, 650 mg at 06/08/20 1542    dextrose 50% injection 12.5 g, 12.5 g, Intravenous, PRN, Nant[place]adee P. Tavon, APRN    dextrose 50% injection 25 g, 25 g, Intravenous, PRN, N[place]adee P. Tavon, APRN    glucagon (human recombinant) injection 1 mg, 1 mg, Intramuscular, PRN, Nant[place]adee P. Tavon, APRN    glucose chewable tablet 16 g, 16 g, Oral, PRN, Nantawadee P. Tavon, APRN    glucose chewable tablet 24 g, 24 g, Oral, PRN, Nantawadee P. Tavon, APRN    hydrALAZINE injection 10 mg, 10 mg, Intravenous, Q4H PRN, Desmond Castillo MD    HYDROcodone-acetaminophen 5-325 mg per tablet 1 tablet, 1 tablet, Oral, Q6H PRN, Nant[place]adee P. Tavon, APRN, 1 tablet at 06/09/20 0612    ibuprofen tablet 400 mg, 400 mg, Oral, TID, Remigio Stock MD, 400 mg at 06/09/20 0900    insulin aspart U-100 pen 0-5 Units, 0-5 Units, Subcutaneous, QID (AC + HS) PRN, Nant[place]adee P. Tavon, APRN    insulin detemir U-100 pen 25 Units, 25 Units, Subcutaneous, QHS, Carmella LITTLE  Tavon, APRN, 25 Units at 06/08/20 2047    labetaloL injection 10 mg, 10 mg, Intravenous, Q2H PRN, Desmond Castillo MD    levoFLOXacin 500 mg/100 mL IVPB 500 mg, 500 mg, Intravenous, Q24H, Desmond Castillo MD, Last Rate: 100 mL/hr at 06/09/20 0144, 500 mg at 06/09/20 0144    magnesium oxide tablet 800 mg, 800 mg, Oral, PRN, Nant[place]adee P. Tavon, APRN, 800 mg at 06/08/20 0856    magnesium oxide tablet 800 mg, 800 mg, Oral, PRN, Nantawadee P. Tavon, APRN, 800 mg at 06/08/20 0352    melatonin tablet 6 mg, 6 mg, Oral, Nightly PRN, Nantawadee P. Tavon, APRN    morphine injection 2 mg, 2 mg, Intravenous, Q4H PRN, Nantawadee P. Tavon, APRN, 2 mg at 06/09/20 0834    ondansetron injection 4 mg, 4 mg, Intravenous, Q8H PRN, Nantawadee P. Tavon, APRN    potassium chloride 10% oral solution 40 mEq, 40 mEq, Oral, PRN, Nantawadee P. Tavon, APRN    potassium chloride 10% oral solution 40 mEq, 40 mEq, Oral, PRN, Nantawadee P. Tavon, APRN, 40 mEq at 06/07/20 2139    potassium chloride 10% oral solution 40 mEq, 40 mEq, Oral, PRN, Nantawadee P. Tavon, APRN    potassium, sodium phosphates 280-160-250 mg packet 2 packet, 2 packet, Oral, PRN, Nantawadee P. Tavon, APRN    potassium, sodium phosphates 280-160-250 mg packet 2 packet, 2 packet, Oral, PRN, Nantawadee P. Tavon, APRN    potassium, sodium phosphates 280-160-250 mg packet 2 packet, 2 packet, Oral, PRN, Nantawadee P. Tavon, APRN    promethazine (PHENERGAN) 6.25 mg in dextrose 5 % 50 mL IVPB, 6.25 mg, Intravenous, Q6H PRN, Nantawadee P. Tavon, APRN    sodium chloride 0.9% flush 10 mL, 10 mL, Intravenous, PRN, Carmella Montes De Oca, APRN    vancomycin (VANCOCIN) 1,250 mg in dextrose 5 % 250 mL IVPB, 1,250 mg, Intravenous, Q12H, Remigio Stock MD, Last Rate: 166.7 mL/hr at 06/09/20 0255, 1,250 mg at 06/09/20 0255    Pharmacy to dose Vancomycin consult, , , Once **AND** vancomycin - pharmacy to dose, , Intravenous, pharmacy to manage frequency, Remigio Stock MD        Physical Exam:   Vitals:     06/08/20 1945 06/08/20 2306 06/09/20 0328 06/09/20 0745   BP: 131/77 137/83 124/79 135/88   BP Location: Right arm Right arm Right arm Right arm   Patient Position: Lying Lying Lying Lying   Pulse: 97 87 84 88   Resp: 16 16 16 16   Temp: 98.8 °F (37.1 °C) 98 °F (36.7 °C) 98.3 °F (36.8 °C) 98.3 °F (36.8 °C)   TempSrc: Oral Oral Oral Oral   SpO2: 99% 99% 99% 98%   Weight:       Height:         Recent Labs   Lab 06/06/20  2141  06/09/20  0456   CALCIUM 8.2*   < > 8.0*   PROT 7.0  --   --    *   < > 137   K 3.7   < > 3.4*   CO2 19*   < > 22*   CL 99   < > 107   BUN 6   < > 7   CREATININE 0.8   < > 0.6    < > = values in this interval not displayed.     Recent Labs   Lab 06/09/20 0456   WBC 4.94   RBC 3.81*   HGB 10.2*   HCT 32.4*   *     No results for input(s): PT, INR, APTT in the last 72 hours.    Awake/alert/oriented x3, No acute distress, Afebrile, Vital signs stable  Normocephalic, Atraumatic  Heart beating at normal rate  Good inspiratory effort with unlaboured breathing  Abdomen soft/nondistended/nontender      Assessment:   39 y.o. male with lower extremity cellulitis    Plan:   Low suspicion for compartment syndrome  Activity as tolerated    Dominguez Winkler MD  [facility]

## 2020-06-09 NOTE — PROGRESS NOTES
[facility] Medicine  Progress Note    Patient name: Flako Quintana  MRN: [id number]  Admit Date: 6/6/2020   LOS: 3 days     SUBJECTIVE:     Principal problem: Cellulitis of right lower extremity    Interval History:  Pt feels better, r/leg swelling improving, awaiting for MRI foot                                No fever for the last 24hrs, mild hypokalemia will treat                               CRP improving, on day 4 Vanco, repeat blood culture negative to date                               ID consult pending for MRSA bacteremia, echo no vegetations documented      Scheduled Meds:   ibuprofen  400 mg Oral TID    insulin detemir U-100  25 Units Subcutaneous QHS    levoFLOXacin  500 mg Intravenous Q24H    vancomycin (VANCOCIN) IVPB  1,250 mg Intravenous Q12H     Continuous Infusions:  PRN Meds:acetaminophen, acetaminophen, dextrose 50%, dextrose 50%, glucagon (human recombinant), glucose, glucose, hydrALAZINE, HYDROcodone-acetaminophen, insulin aspart U-100, labetalol, magnesium oxide, magnesium oxide, melatonin, morphine, ondansetron, potassium chloride 10%, potassium chloride 10%, potassium chloride 10%, potassium, sodium phosphates, potassium, sodium phosphates, potassium, sodium phosphates, promethazine (PHENERGAN) IVPB, sodium chloride 0.9%, Pharmacy to dose Vancomycin consult **AND** vancomycin - pharmacy to dose    Review of patient's allergies indicates:  No Known Allergies    Review of Systems: As per interval history    OBJECTIVE:     Vital Signs (Most Recent)  Temp: 98.3 °F (36.8 °C) (06/09/20 0745)  Pulse: 88 (06/09/20 0745)  Resp: 16 (06/09/20 0745)  BP: 135/88 (06/09/20 0745)  SpO2: 98 % (06/09/20 0745)    Vital Signs Range (Last 24H):  Temp:  [98 °F (36.7 °C)-100.5 °F (38.1 °C)]   Pulse:  []   Resp:  [16-17]   BP: (124-147)/(77-90)   SpO2:  [97 %-99 %]     I & O (Last 24H):    Intake/Output Summary (Last 24 hours) at 6/9/2020 0917  Last data filed at 6/9/2020  0820  Gross per 24 hour   Intake 220 ml   Output 1950 ml   Net -1730 ml       Physical Exam:  General: Patient resting comfortably in no acute distress. Appears as stated age. Calm  Eyes: No conjunctival injection. No scleral icterus.  ENT: Hearing grossly intact. No discharge from ears. No nasal discharge.   Neck: Supple, trachea midline. No JVD  CVS: RRR. No LE edema BL  Lungs: CTA BL, no wheezing or crackles. Good breath sounds. No accessory muscle use. No acute respiratory distress  Abdomen:  Soft, nontender and nondistended.  No organomegaly  Neuro: AOx3. Moves all extremities. Follows commands. Responds appropriately   Skin:  No rash or erythema noted    Laboratory:  CBC:   Recent Labs   Lab 06/09/20  0456   WBC 4.94   RBC 3.81*   HGB 10.2*   HCT 32.4*   *   MCV 85   MCH 26.8*   MCHC 31.5*     CMP:   Recent Labs   Lab 06/06/20  2141  06/09/20  0456   *   < > 170*   CALCIUM 8.2*   < > 8.0*   ALBUMIN 2.8*  --   --    PROT 7.0  --   --    *   < > 137   K 3.7   < > 3.4*   CO2 19*   < > 22*   CL 99   < > 107   BUN 6   < > 7   CREATININE 0.8   < > 0.6   ALKPHOS 184*  --   --    ALT 20  --   --    AST 33  --   --    BILITOT 3.0*  --   --     < > = values in this interval not displayed.         ASSESSMENT/PLAN:       Active Hospital Problems    Diagnosis  POA    *Cellulitis of right lower extremity [L03.115]  Yes    MRSA bacteremia [R78.81]  Yes    Polysubstance abuse [F19.10]  Yes    Sepsis [A41.9]  Yes    Diabetic foot ulcer [E11.621, L97.509]  Yes    Cigarette nicotine dependence without complication [F17.210]  Yes    Type 2 diabetes mellitus with hyperglycemia, with long-term current use of insulin [E11.65, Z79.4]  Not Applicable     Chronic    Thrombocytopenia [D69.6]  Yes      Resolved Hospital Problems   No resolved problems to display.         Plan:   Cellulitis of right lower extremity with MRSA bacteremia  Sepsis resolved  Negative for DVT  Prev IV clindamycin. Now IV vanco and IV  Levaquin  Daily CRP  Continue medications. ISS  Repeat BCx ordered  Pain control  Case discussed with orthopedic surgeon, Dr. Winkler.  Low suspicion for septic joint.   MRI foot planned today  Echo no vegetations documented     Consult infectious disease  Consult orthopedic surgery  Consult podiatry  Appreciate consult     Hold Lovenox in the setting of possible hematoma on MRI      VTE Risk Mitigation (From admission, onward)         Ordered     IP VTE LOW RISK PATIENT  Once      06/06/20 1669     Place sequential compression device  Until discontinued      06/06/20 2630                  Department Hospital Medicine  [facility]  Franny Lee MD  Date of service: 06/09/2020

## 2020-06-10 LAB
ANION GAP SERPL CALC-SCNC: 6 MMOL/L (ref 8–16)
BASOPHILS # BLD AUTO: 0.03 K/UL (ref 0–0.2)
BASOPHILS NFR BLD: 0.5 % (ref 0–1.9)
BUN SERPL-MCNC: 7 MG/DL (ref 6–20)
CALCIUM SERPL-MCNC: 8.3 MG/DL (ref 8.7–10.5)
CHLORIDE SERPL-SCNC: 109 MMOL/L (ref 95–110)
CO2 SERPL-SCNC: 24 MMOL/L (ref 23–29)
CREAT SERPL-MCNC: 0.6 MG/DL (ref 0.5–1.4)
CRP SERPL-MCNC: 6.35 MG/DL
DIFFERENTIAL METHOD: ABNORMAL
EOSINOPHIL # BLD AUTO: 0.1 K/UL (ref 0–0.5)
EOSINOPHIL NFR BLD: 2.2 % (ref 0–8)
ERYTHROCYTE [DISTWIDTH] IN BLOOD BY AUTOMATED COUNT: 16.3 % (ref 11.5–14.5)
EST. GFR  (AFRICAN AMERICAN): >60 ML/MIN/1.73 M^2
EST. GFR  (NON AFRICAN AMERICAN): >60 ML/MIN/1.73 M^2
ESTIMATED AVG GLUCOSE: 140 MG/DL (ref 68–131)
GLUCOSE SERPL-MCNC: 167 MG/DL (ref 70–110)
GLUCOSE SERPL-MCNC: 170 MG/DL (ref 70–110)
GLUCOSE SERPL-MCNC: 235 MG/DL (ref 70–110)
GLUCOSE SERPL-MCNC: 66 MG/DL (ref 70–110)
GLUCOSE SERPL-MCNC: 66 MG/DL (ref 70–110)
HBA1C MFR BLD HPLC: 6.5 % (ref 4.5–6.2)
HCT VFR BLD AUTO: 30.4 % (ref 40–54)
HGB BLD-MCNC: 9.7 G/DL (ref 14–18)
IMM GRANULOCYTES # BLD AUTO: 0.02 K/UL (ref 0–0.04)
IMM GRANULOCYTES NFR BLD AUTO: 0.4 % (ref 0–0.5)
LYMPHOCYTES # BLD AUTO: 1.5 K/UL (ref 1–4.8)
LYMPHOCYTES NFR BLD: 27.4 % (ref 18–48)
MAGNESIUM SERPL-MCNC: 1.6 MG/DL (ref 1.6–2.6)
MCH RBC QN AUTO: 27.1 PG (ref 27–31)
MCHC RBC AUTO-ENTMCNC: 31.9 G/DL (ref 32–36)
MCV RBC AUTO: 85 FL (ref 82–98)
MONOCYTES # BLD AUTO: 0.8 K/UL (ref 0.3–1)
MONOCYTES NFR BLD: 13.9 % (ref 4–15)
NEUTROPHILS # BLD AUTO: 3.1 K/UL (ref 1.8–7.7)
NEUTROPHILS NFR BLD: 55.6 % (ref 38–73)
NRBC BLD-RTO: 0 /100 WBC
PLATELET # BLD AUTO: 118 K/UL (ref 150–350)
PMV BLD AUTO: 11.8 FL (ref 9.2–12.9)
POTASSIUM SERPL-SCNC: 3.3 MMOL/L (ref 3.5–5.1)
RBC # BLD AUTO: 3.58 M/UL (ref 4.6–6.2)
SODIUM SERPL-SCNC: 139 MMOL/L (ref 136–145)
VANCOMYCIN TROUGH SERPL-MCNC: 12.4 UG/ML (ref 10–22)
VANCOMYCIN TROUGH SERPL-MCNC: 7 UG/ML (ref 10–22)
WBC # BLD AUTO: 5.54 K/UL (ref 3.9–12.7)

## 2020-06-10 PROCEDURE — 36415 COLL VENOUS BLD VENIPUNCTURE: CPT

## 2020-06-10 PROCEDURE — 25000003 PHARM REV CODE 250: Performed by: INTERNAL MEDICINE

## 2020-06-10 PROCEDURE — 80202 ASSAY OF VANCOMYCIN: CPT

## 2020-06-10 PROCEDURE — 80202 ASSAY OF VANCOMYCIN: CPT | Mod: 91

## 2020-06-10 PROCEDURE — 63600175 PHARM REV CODE 636 W HCPCS: Performed by: INTERNAL MEDICINE

## 2020-06-10 PROCEDURE — 82962 GLUCOSE BLOOD TEST: CPT

## 2020-06-10 PROCEDURE — 12000002 HC ACUTE/MED SURGE SEMI-PRIVATE ROOM

## 2020-06-10 PROCEDURE — 85025 COMPLETE CBC W/AUTO DIFF WBC: CPT

## 2020-06-10 PROCEDURE — 63600175 PHARM REV CODE 636 W HCPCS: Performed by: NURSE PRACTITIONER

## 2020-06-10 PROCEDURE — 86140 C-REACTIVE PROTEIN: CPT

## 2020-06-10 PROCEDURE — 87040 BLOOD CULTURE FOR BACTERIA: CPT

## 2020-06-10 PROCEDURE — 83735 ASSAY OF MAGNESIUM: CPT

## 2020-06-10 PROCEDURE — 63600175 PHARM REV CODE 636 W HCPCS: Performed by: FAMILY MEDICINE

## 2020-06-10 PROCEDURE — 80048 BASIC METABOLIC PNL TOTAL CA: CPT

## 2020-06-10 PROCEDURE — 25000003 PHARM REV CODE 250: Performed by: FAMILY MEDICINE

## 2020-06-10 PROCEDURE — 25000003 PHARM REV CODE 250: Performed by: NURSE PRACTITIONER

## 2020-06-10 RX ORDER — POTASSIUM CHLORIDE 20 MEQ/1
40 TABLET, EXTENDED RELEASE ORAL ONCE
Status: COMPLETED | OUTPATIENT
Start: 2020-06-10 | End: 2020-06-10

## 2020-06-10 RX ADMIN — INSULIN DETEMIR 25 UNITS: 100 INJECTION, SOLUTION SUBCUTANEOUS at 08:06

## 2020-06-10 RX ADMIN — VANCOMYCIN HYDROCHLORIDE 1250 MG: 1 INJECTION, POWDER, LYOPHILIZED, FOR SOLUTION INTRAVENOUS at 08:06

## 2020-06-10 RX ADMIN — IBUPROFEN 400 MG: 400 TABLET ORAL at 08:06

## 2020-06-10 RX ADMIN — MAGNESIUM OXIDE 800 MG: 400 TABLET ORAL at 08:06

## 2020-06-10 RX ADMIN — VANCOMYCIN HYDROCHLORIDE 1250 MG: 1 INJECTION, POWDER, LYOPHILIZED, FOR SOLUTION INTRAVENOUS at 01:06

## 2020-06-10 RX ADMIN — MORPHINE SULFATE 2 MG: 2 INJECTION, SOLUTION INTRAMUSCULAR; INTRAVENOUS at 08:06

## 2020-06-10 RX ADMIN — FAMOTIDINE 20 MG: 20 TABLET, FILM COATED ORAL at 08:06

## 2020-06-10 RX ADMIN — Medication 16 G: at 08:06

## 2020-06-10 RX ADMIN — IBUPROFEN 400 MG: 400 TABLET ORAL at 02:06

## 2020-06-10 RX ADMIN — POTASSIUM CHLORIDE 40 MEQ: 20 TABLET, EXTENDED RELEASE ORAL at 08:06

## 2020-06-10 RX ADMIN — HYDROCODONE BITARTRATE AND ACETAMINOPHEN 1 TABLET: 5; 325 TABLET ORAL at 09:06

## 2020-06-10 RX ADMIN — HYDROCODONE BITARTRATE AND ACETAMINOPHEN 1 TABLET: 5; 325 TABLET ORAL at 12:06

## 2020-06-10 RX ADMIN — VANCOMYCIN HYDROCHLORIDE 1250 MG: 1 INJECTION, POWDER, LYOPHILIZED, FOR SOLUTION INTRAVENOUS at 09:06

## 2020-06-10 NOTE — PLAN OF CARE
Problem: Wound  Goal: Optimal Wound Healing  Outcome: Ongoing, Progressing  Intervention: Promote Effective Wound Healing  Flowsheets (Taken 6/10/2020 1211)  Oral Nutrition Promotion: calorie dense liquids provided   Stew added BID to aid in wound healing

## 2020-06-10 NOTE — PROGRESS NOTES
[facility] Medicine  Progress Note    Patient name: Flako Quintana  MRN: [id number]  Admit Date: 6/6/2020   LOS: 4 days     SUBJECTIVE:     Principal problem: Cellulitis of right lower extremity    Interval History:  Pt feels better, r/leg swelling improving, awaiting for MRI lumbar spine negative osteomyelitis, MRI foot reviewed by Podiatrist no drainable abscess, recommends local wound care  No fever for the last 48hrs, mild hypokalemia will treat                               CRP improving, on day 5 Vanco, repeat blood culture negative to date                               ID consult appreciated for MRSA bacteremia, echo no vegetations documented                               consult for LTAC placement for completion of IV antibiotic      Scheduled Meds:   famotidine  20 mg Oral BID    ibuprofen  400 mg Oral TID    insulin detemir U-100  25 Units Subcutaneous QHS    vancomycin (VANCOCIN) IVPB  1,250 mg Intravenous Q8H     Continuous Infusions:  PRN Meds:acetaminophen, acetaminophen, dextrose 50%, dextrose 50%, glucagon (human recombinant), glucose, glucose, hydrALAZINE, HYDROcodone-acetaminophen, insulin aspart U-100, labetalol, magnesium oxide, magnesium oxide, melatonin, morphine, ondansetron, potassium chloride 10%, potassium chloride 10%, potassium chloride 10%, potassium, sodium phosphates, potassium, sodium phosphates, potassium, sodium phosphates, promethazine (PHENERGAN) IVPB, sodium chloride 0.9%, Pharmacy to dose Vancomycin consult **AND** vancomycin - pharmacy to dose    Review of patient's allergies indicates:  No Known Allergies    Review of Systems: As per interval history    OBJECTIVE:     Vital Signs (Most Recent)  Temp: 99.1 °F (37.3 °C) (06/10/20 1157)  Pulse: 96 (06/10/20 1157)  Resp: 18 (06/10/20 1157)  BP: (!) 149/92 (06/10/20 1157)  SpO2: 99 % (06/10/20 1157)    Vital Signs Range (Last 24H):  Temp:  [97.6 °F (36.4 °C)-99.1 °F (37.3 °C)]   Pulse:   []   Resp:  [16-18]   BP: (137-149)/(69-92)   SpO2:  [98 %-100 %]     I & O (Last 24H):    Intake/Output Summary (Last 24 hours) at 6/10/2020 1542  Last data filed at 6/10/2020 0930  Gross per 24 hour   Intake 1250 ml   Output 2975 ml   Net -1725 ml       Physical Exam:  General: Patient resting comfortably in no acute distress. Appears as stated age. Calm  Eyes: No conjunctival injection. No scleral icterus.  ENT: Hearing grossly intact. No discharge from ears. No nasal discharge.   Neck: Supple, trachea midline. No JVD  CVS: RRR. No LE edema BL  Lungs: CTA BL, no wheezing or crackles. Good breath sounds. No accessory muscle use. No acute respiratory distress  Abdomen:  Soft, nontender and nondistended.  No organomegaly  Neuro: AOx3. Moves all extremities. Follows commands. Responds appropriately   Skin:  No rash or erythema noted    Laboratory:  CBC:   Recent Labs   Lab 06/10/20  0544   WBC 5.54   RBC 3.58*   HGB 9.7*   HCT 30.4*   *   MCV 85   MCH 27.1   MCHC 31.9*     CMP:   Recent Labs   Lab 06/06/20  2141  06/10/20  0544   *   < > 66*   CALCIUM 8.2*   < > 8.3*   ALBUMIN 2.8*  --   --    PROT 7.0  --   --    *   < > 139   K 3.7   < > 3.3*   CO2 19*   < > 24   CL 99   < > 109   BUN 6   < > 7   CREATININE 0.8   < > 0.6   ALKPHOS 184*  --   --    ALT 20  --   --    AST 33  --   --    BILITOT 3.0*  --   --     < > = values in this interval not displayed.         ASSESSMENT/PLAN:       Active Hospital Problems    Diagnosis  POA    *Cellulitis of right lower extremity [L03.115]  Yes    MRSA bacteremia [R78.81]  Yes    Polysubstance abuse [F19.10]  Yes    Sepsis [A41.9]  Yes    Diabetic foot ulcer [E11.621, L97.509]  Yes    Cigarette nicotine dependence without complication [F17.210]  Yes    Type 2 diabetes mellitus with hyperglycemia, with long-term current use of insulin [E11.65, Z79.4]  Not Applicable     Chronic    Thrombocytopenia [D69.6]  Yes      Resolved Hospital Problems   No  resolved problems to display.         Plan:   Cellulitis of right lower extremity with MRSA bacteremia  Sepsis resolved  Negative for DVT  Prev IV clindamycin. Now IV vanco Daily CRP  Continue medications. ISS  Repeat BCx ordered  Pain control  Case discussed with orthopedic surgeon, Dr. Winkler.  Low suspicion for septic joint.   MRI foot planned today  Echo no vegetations documented     Consult infectious disease  Consult orthopedic surgery  Consult podiatry  Appreciate consult     Hold Lovenox in the setting of possible hematoma on MRI      VTE Risk Mitigation (From admission, onward)         Ordered     IP VTE LOW RISK PATIENT  Once      06/06/20 2319     Place sequential compression device  Until discontinued      06/06/20 2319                  Department Hospital Medicine  Formerly [facility]  Franny Lee MD  Date of service: 06/10/2020

## 2020-06-10 NOTE — PROGRESS NOTES
Pharmacokinetic Assessment Follow Up: IV Vancomycin    Vancomycin serum concentration assessment(s):    The trough level was drawn correctly and can be used to guide therapy at this time. The measurement is below the desired definitive target range of 15 to 20 mcg/mL.    Vancomycin Regimen Plan:    Change regimen to Vancomycin 1250 mg IV every 8 hours with next serum trough concentration measured at 17:00 prior to 3rd dose on 6/10     Drug levels (last 3 results):  Recent Labs   Lab Result Units 06/10/20  0058   Vancomycin-Trough ug/mL 7.0*       Pharmacy will continue to follow and monitor vancomycin.    Please contact pharmacy at extension [phone] for questions regarding this assessment.    Thank you for the consult,   Connor Hdez       Patient brief summary:  Flako Quintana is a 39 y.o. male initiated on antimicrobial therapy with IV Vancomycin for treatment of bacteremia    The patient's current regimen is 1250 q 12 h      Actual Body Weight:   79.7 kg    Renal Function:   Estimated Creatinine Clearance: 188.2 mL/min (based on SCr of 0.6 mg/dL).

## 2020-06-10 NOTE — CARE UPDATE
MRI of foot has not been formally read by Radiology, but upon viewing images it does not appear to show any drainable abscess.  Therefore, recommend continue local wound care to right foot wound consisting of home Aquacel Ag daily dressing changes.  I will sign off on patient.  Patient to follow up outpatient at [facility].
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
## 2020-06-10 NOTE — PROGRESS NOTES
06/10/20 0945        Wound 12/06/19 0447 Diabetic Ulcer Plantar #2   Date First Assessed/Time First Assessed: 12/06/19 0447   Primary Wound Type: Diabetic Ulcer  Side: Right  Location: Plantar  Wound/PI Number (optional): #2   Dressing Appearance Dry   Drainage Amount None   Appearance Taylor   Wound Edges Callused   Care Cleansed with:;Wound cleanser   Dressing Gauze;Rolled gauze   Packing Incision packing removed;Incision packed with  (aquacel ag)   Packing Inserted  1  (cm)   dry healed callused area right lateral, still has swelling foot, ankle, leg.  Dry shallow callus plantar x 2 left foot. Encourage to see podiatry again after discharge.

## 2020-06-10 NOTE — PLAN OF CARE
Problem: Wound  Goal: Optimal Wound Healing  Outcome: Ongoing, Progressing  Intervention: Promote Effective Wound Healing  Flowsheets (Taken 6/10/2020 1738)  Oral Nutrition Promotion: calorie dense foods provided  Pain Management Interventions: care clustered; pain management plan reviewed with patient/caregiver; quiet environment facilitated     Problem: Adult Inpatient Plan of Care  Goal: Plan of Care Review  Outcome: Ongoing, Progressing  Goal: Patient-Specific Goal (Individualization)  Outcome: Ongoing, Progressing  Goal: Absence of Hospital-Acquired Illness or Injury  Outcome: Ongoing, Progressing  Intervention: Identify and Manage Fall Risk  Flowsheets (Taken 6/10/2020 1738)  Safety Promotion/Fall Prevention: assistive device/personal item within reach; bed alarm set; Fall Risk reviewed with patient/family; nonskid shoes/socks when out of bed; side rails raised x 2; instructed to call staff for mobility  Intervention: Prevent VTE (venous thromboembolism)  Flowsheets (Taken 6/10/2020 1738)  VTE Prevention/Management: ambulation promoted  Goal: Optimal Comfort and Wellbeing  Outcome: Ongoing, Progressing  Goal: Readiness for Transition of Care  Outcome: Ongoing, Progressing  Goal: Rounds/Family Conference  Outcome: Ongoing, Progressing     Problem: Diabetes Comorbidity  Goal: Blood Glucose Level Within Desired Range  Outcome: Ongoing, Progressing  Intervention: Maintain Glycemic Control  Flowsheets (Taken 6/10/2020 1738)  Glycemic Management: blood glucose monitoring     Problem: Adjustment to Illness (Sepsis/Septic Shock)  Goal: Optimal Coping  Outcome: Ongoing, Progressing     Problem: Bleeding (Sepsis/Septic Shock)  Goal: Absence of Bleeding  Outcome: Ongoing, Progressing     Problem: Glycemic Control Impaired (Sepsis/Septic Shock)  Goal: Blood Glucose Level Within Desired Range  Outcome: Ongoing, Progressing  Intervention: Optimize Glycemic Control  Flowsheets (Taken 6/10/2020 1738)  Glycemic Management:  blood glucose monitoring     Problem: Hemodynamic Instability (Sepsis/Septic Shock)  Goal: Effective Tissue Perfusion  Outcome: Ongoing, Progressing     Problem: Infection (Sepsis/Septic Shock)  Goal: Absence of Infection Signs/Symptoms  Outcome: Ongoing, Progressing  Intervention: Prevent or Manage Infection Progression  Flowsheets (Taken 6/10/2020 1738)  Infection Management: aseptic technique maintained  Isolation Precautions: contact precautions maintained     Problem: Nutrition Impaired (Sepsis/Septic Shock)  Goal: Optimal Nutrition Intake  Outcome: Ongoing, Progressing     Problem: Respiratory Compromise (Sepsis/Septic Shock)  Goal: Effective Oxygenation and Ventilation  Outcome: Ongoing, Progressing     Problem: Fall Injury Risk  Goal: Absence of Fall and Fall-Related Injury  Outcome: Ongoing, Progressing  Intervention: Promote Injury-Free Environment  Flowsheets (Taken 6/10/2020 1738)  Safety Promotion/Fall Prevention: assistive device/personal item within reach; bed alarm set; Fall Risk reviewed with patient/family; nonskid shoes/socks when out of bed; side rails raised x 2; instructed to call staff for mobility  Environmental Safety Modification: assistive device/personal items within reach; clutter free environment maintained; room organization consistent; lighting adjusted     Problem: Infection  Goal: Infection Symptom Resolution  Outcome: Ongoing, Progressing  Intervention: Prevent or Manage Infection  Flowsheets (Taken 6/10/2020 1738)  Infection Management: aseptic technique maintained  Isolation Precautions: contact precautions maintained

## 2020-06-10 NOTE — PROGRESS NOTES
"[facility]  Adult Nutrition  Progress Note    SUMMARY       Recommendations/Interventions    1. Continue current diet as tolerated. 2. Added Stew daily to aid in wound healing 3. Recommend initiating vitamins thiamin, folic acid, and multivitamin 2' hx of alcoholism  Goals: 1. Pt to meet >75% of estimated needs via oral intake  Nutrition Goal Status: new    Dietitian Rounds Brief  Pt assessed 2' LOS x 4 days. Pt reports good appetite and has been eating >75% of meals. Pt reports he has had a diabetic diet education from his home nurse ~1 month ago. He is aware of things he should not eat (white bread, soft drinks) and reports he portions his food. Noted heavy drinking and polysubstance abuse in the note- recommend to initiate thiamin, folic acid and a multivitamin. Also recommended 2 packets of Stew per day to aid in wound healing (mix packet into cup of water), pt accepted.     Reason for Assessment    Reason For Assessment: length of stay  Diagnosis: other (see comments)(cellulitis of right lower extremity)  Relevant Medical History: DM, HTN, polysubstance abuse  Interdisciplinary Rounds: attended    Nutrition Risk Screen    Nutrition Risk Screen: large or nonhealing wound, burn or pressure injury    Nutrition/Diet History    Patient Reported Diet/Restrictions/Preferences: diabetic diet  Food Allergies: NKFA    Anthropometrics    Temp: 98.5 °F (36.9 °C)  Height Method: Stated  Height: 6' 1" (185.4 cm)  Height (inches): 73 in  Weight Method: Bed Scale  Weight: 79.7 kg (175 lb 11.3 oz)  Weight (lb): 175.71 lb  Ideal Body Weight (IBW), Male: 184 lb  % Ideal Body Weight, Male (lb): 95.49 %  BMI (Calculated): 23.2  BMI Grade: 18.5-24.9 - normal       Lab/Procedures/Meds    Pertinent Labs Reviewed: reviewed  Clinical Chemistry:  Recent Labs   Lab 06/06/20  2141  06/10/20  0544   *   < > 139   K 3.7   < > 3.3*   CL 99   < > 109   CO2 19*   < > 24   *   < > 66*   BUN 6   < > 7   CREATININE " 0.8   < > 0.6   CALCIUM 8.2*   < > 8.3*   PROT 7.0  --   --    ALBUMIN 2.8*  --   --    BILITOT 3.0*  --   --    ALKPHOS 184*  --   --    AST 33  --   --    ALT 20  --   --    ANIONGAP 13   < > 6*   ESTGFRAFRICA >60.0   < > >60.0   EGFRNONAA >60.0   < > >60.0   MG  --    < > 1.6    < > = values in this interval not displayed.     CBC:   Recent Labs   Lab 06/10/20  0544   WBC 5.54   RBC 3.58*   HGB 9.7*   HCT 30.4*   *   MCV 85   MCH 27.1   MCHC 31.9*     Lipid Panel:  No results for input(s): CHOL, HDL, LDLCALC, TRIG, CHOLHDL in the last 168 hours.  Cardiac Profile:  Recent Labs   Lab 06/07/20  0521 06/07/20  1036 06/07/20  1429    163 184     Inflammatory Labs:  Recent Labs   Lab 06/08/20  0453 06/09/20  0456 06/10/20  0544   CRP 9.41* 7.18* 6.35*     Diabetes:  Recent Labs   Lab 06/09/20  0456   HGBA1C 6.5*     Pertinent Medications Reviewed: reviewed  Scheduled Meds:   famotidine  20 mg Oral BID    ibuprofen  400 mg Oral TID    insulin detemir U-100  25 Units Subcutaneous QHS    vancomycin (VANCOCIN) IVPB  1,250 mg Intravenous Q8H     Estimated/Assessed Needs    Weight Used For Calorie Calculations: 79.7 kg (175 lb 11.3 oz)  Energy Calorie Requirements (kcal): 1993-2391 kcal (25-30 kcal/kg)  Energy Need Method: Kcal/kg  Protein Requirements:  g (1.2-1.5 g/kg)  Weight Used For Protein Calculations: 79.7 kg (175 lb 11.3 oz)     Estimated Fluid Requirement Method: RDA Method  RDA Method (mL): 1993     Nutrition Prescription Ordered    Current Diet Order: 2000 calorie diabetic    Evaluation of Received Nutrient/Fluid Intake    Energy Calories Required: meeting needs  Protein Required: meeting needs  Fluid Required: meeting needs  Tolerance: tolerating  % Intake of Estimated Energy Needs: 75 - 100 %  % Meal Intake: 75 - 100 %    Nutrition Risk    Level of Risk/Frequency of Follow-up: moderate     Monitor and Evaluation    Food and Nutrient Intake: energy intake, food and beverage intake  Food  and Nutrient Adminstration: diet order  Physical Activity and Function: nutrition-related ADLs and IADLs  Anthropometric Measurements: weight, weight change  Biochemical Data, Medical Tests and Procedures: electrolyte and renal panel, gastrointestinal profile, glucose/endocrine profile, inflammatory profile, lipid profile  Nutrition-Focused Physical Findings: overall appearance     Nutrition Follow-Up    RD Follow-up?: Yes   Bertha Ashley

## 2020-06-10 NOTE — PLAN OF CARE
Problem: Wound  Goal: Optimal Wound Healing  Outcome: Ongoing, Progressing     Problem: Adult Inpatient Plan of Care  Goal: Plan of Care Review  Outcome: Ongoing, Progressing  Goal: Patient-Specific Goal (Individualization)  Outcome: Ongoing, Progressing  Goal: Absence of Hospital-Acquired Illness or Injury  Outcome: Ongoing, Progressing  Goal: Optimal Comfort and Wellbeing  Outcome: Ongoing, Progressing  Goal: Readiness for Transition of Care  Outcome: Ongoing, Progressing  Goal: Rounds/Family Conference  Outcome: Ongoing, Progressing     Problem: Diabetes Comorbidity  Goal: Blood Glucose Level Within Desired Range  Outcome: Ongoing, Progressing     Problem: Adjustment to Illness (Sepsis/Septic Shock)  Goal: Optimal Coping  Outcome: Ongoing, Progressing     Problem: Bleeding (Sepsis/Septic Shock)  Goal: Absence of Bleeding  Outcome: Ongoing, Progressing     Problem: Glycemic Control Impaired (Sepsis/Septic Shock)  Goal: Blood Glucose Level Within Desired Range  Outcome: Ongoing, Progressing     Problem: Hemodynamic Instability (Sepsis/Septic Shock)  Goal: Effective Tissue Perfusion  Outcome: Ongoing, Progressing     Problem: Infection (Sepsis/Septic Shock)  Goal: Absence of Infection Signs/Symptoms  Outcome: Ongoing, Progressing     Problem: Nutrition Impaired (Sepsis/Septic Shock)  Goal: Optimal Nutrition Intake  Outcome: Ongoing, Progressing     Problem: Respiratory Compromise (Sepsis/Septic Shock)  Goal: Effective Oxygenation and Ventilation  Outcome: Ongoing, Progressing     Problem: Fall Injury Risk  Goal: Absence of Fall and Fall-Related Injury  Outcome: Ongoing, Progressing     Problem: Infection  Goal: Infection Symptom Resolution  Outcome: Ongoing, Progressing

## 2020-06-11 LAB
ANION GAP SERPL CALC-SCNC: 9 MMOL/L (ref 8–16)
BACTERIA BLD CULT: NORMAL
BASOPHILS # BLD AUTO: 0.03 K/UL (ref 0–0.2)
BASOPHILS NFR BLD: 0.6 % (ref 0–1.9)
BUN SERPL-MCNC: 10 MG/DL (ref 6–20)
CALCIUM SERPL-MCNC: 8.4 MG/DL (ref 8.7–10.5)
CHLORIDE SERPL-SCNC: 106 MMOL/L (ref 95–110)
CO2 SERPL-SCNC: 20 MMOL/L (ref 23–29)
CREAT SERPL-MCNC: 0.7 MG/DL (ref 0.5–1.4)
CRP SERPL-MCNC: 4.65 MG/DL
DIFFERENTIAL METHOD: ABNORMAL
EOSINOPHIL # BLD AUTO: 0.1 K/UL (ref 0–0.5)
EOSINOPHIL NFR BLD: 2.7 % (ref 0–8)
ERYTHROCYTE [DISTWIDTH] IN BLOOD BY AUTOMATED COUNT: 16.2 % (ref 11.5–14.5)
EST. GFR  (AFRICAN AMERICAN): >60 ML/MIN/1.73 M^2
EST. GFR  (NON AFRICAN AMERICAN): >60 ML/MIN/1.73 M^2
GLUCOSE SERPL-MCNC: 104 MG/DL (ref 70–110)
GLUCOSE SERPL-MCNC: 109 MG/DL (ref 70–110)
GLUCOSE SERPL-MCNC: 148 MG/DL (ref 70–110)
GLUCOSE SERPL-MCNC: 162 MG/DL (ref 70–110)
GLUCOSE SERPL-MCNC: 245 MG/DL (ref 70–110)
HCT VFR BLD AUTO: 29 % (ref 40–54)
HGB BLD-MCNC: 9.2 G/DL (ref 14–18)
IMM GRANULOCYTES # BLD AUTO: 0.02 K/UL (ref 0–0.04)
IMM GRANULOCYTES NFR BLD AUTO: 0.4 % (ref 0–0.5)
LYMPHOCYTES # BLD AUTO: 1.3 K/UL (ref 1–4.8)
LYMPHOCYTES NFR BLD: 25.3 % (ref 18–48)
MAGNESIUM SERPL-MCNC: 1.6 MG/DL (ref 1.6–2.6)
MCH RBC QN AUTO: 26.8 PG (ref 27–31)
MCHC RBC AUTO-ENTMCNC: 31.7 G/DL (ref 32–36)
MCV RBC AUTO: 85 FL (ref 82–98)
MONOCYTES # BLD AUTO: 0.8 K/UL (ref 0.3–1)
MONOCYTES NFR BLD: 15.9 % (ref 4–15)
NEUTROPHILS # BLD AUTO: 2.9 K/UL (ref 1.8–7.7)
NEUTROPHILS NFR BLD: 55.1 % (ref 38–73)
NRBC BLD-RTO: 0 /100 WBC
PLATELET # BLD AUTO: 133 K/UL (ref 150–350)
PMV BLD AUTO: 11.6 FL (ref 9.2–12.9)
POTASSIUM SERPL-SCNC: 3.7 MMOL/L (ref 3.5–5.1)
RBC # BLD AUTO: 3.43 M/UL (ref 4.6–6.2)
SODIUM SERPL-SCNC: 135 MMOL/L (ref 136–145)
VANCOMYCIN TROUGH SERPL-MCNC: 16.4 UG/ML (ref 10–22)
WBC # BLD AUTO: 5.17 K/UL (ref 3.9–12.7)

## 2020-06-11 PROCEDURE — 63600175 PHARM REV CODE 636 W HCPCS: Performed by: INTERNAL MEDICINE

## 2020-06-11 PROCEDURE — 25000003 PHARM REV CODE 250: Performed by: INTERNAL MEDICINE

## 2020-06-11 PROCEDURE — 36415 COLL VENOUS BLD VENIPUNCTURE: CPT

## 2020-06-11 PROCEDURE — 25000003 PHARM REV CODE 250: Performed by: FAMILY MEDICINE

## 2020-06-11 PROCEDURE — 86140 C-REACTIVE PROTEIN: CPT

## 2020-06-11 PROCEDURE — 12000002 HC ACUTE/MED SURGE SEMI-PRIVATE ROOM

## 2020-06-11 PROCEDURE — 85025 COMPLETE CBC W/AUTO DIFF WBC: CPT

## 2020-06-11 PROCEDURE — 63600175 PHARM REV CODE 636 W HCPCS: Performed by: NURSE PRACTITIONER

## 2020-06-11 PROCEDURE — 80202 ASSAY OF VANCOMYCIN: CPT

## 2020-06-11 PROCEDURE — 80048 BASIC METABOLIC PNL TOTAL CA: CPT

## 2020-06-11 PROCEDURE — 25000003 PHARM REV CODE 250: Performed by: NURSE PRACTITIONER

## 2020-06-11 PROCEDURE — 83735 ASSAY OF MAGNESIUM: CPT

## 2020-06-11 RX ADMIN — INSULIN DETEMIR 25 UNITS: 100 INJECTION, SOLUTION SUBCUTANEOUS at 08:06

## 2020-06-11 RX ADMIN — FAMOTIDINE 20 MG: 20 TABLET, FILM COATED ORAL at 08:06

## 2020-06-11 RX ADMIN — HYDROCODONE BITARTRATE AND ACETAMINOPHEN 1 TABLET: 5; 325 TABLET ORAL at 04:06

## 2020-06-11 RX ADMIN — IBUPROFEN 400 MG: 400 TABLET ORAL at 03:06

## 2020-06-11 RX ADMIN — IBUPROFEN 400 MG: 400 TABLET ORAL at 10:06

## 2020-06-11 RX ADMIN — HYDROCODONE BITARTRATE AND ACETAMINOPHEN 1 TABLET: 5; 325 TABLET ORAL at 08:06

## 2020-06-11 RX ADMIN — FAMOTIDINE 20 MG: 20 TABLET, FILM COATED ORAL at 10:06

## 2020-06-11 RX ADMIN — VANCOMYCIN HYDROCHLORIDE 1250 MG: 1 INJECTION, POWDER, LYOPHILIZED, FOR SOLUTION INTRAVENOUS at 08:06

## 2020-06-11 RX ADMIN — VANCOMYCIN HYDROCHLORIDE 1250 MG: 1 INJECTION, POWDER, LYOPHILIZED, FOR SOLUTION INTRAVENOUS at 12:06

## 2020-06-11 RX ADMIN — IBUPROFEN 400 MG: 400 TABLET ORAL at 08:06

## 2020-06-11 RX ADMIN — VANCOMYCIN HYDROCHLORIDE 1250 MG: 1 INJECTION, POWDER, LYOPHILIZED, FOR SOLUTION INTRAVENOUS at 04:06

## 2020-06-11 RX ADMIN — HYDROCODONE BITARTRATE AND ACETAMINOPHEN 1 TABLET: 5; 325 TABLET ORAL at 03:06

## 2020-06-11 RX ADMIN — MORPHINE SULFATE 2 MG: 2 INJECTION, SOLUTION INTRAMUSCULAR; INTRAVENOUS at 10:06

## 2020-06-11 RX ADMIN — INSULIN ASPART 1 UNITS: 100 INJECTION, SOLUTION INTRAVENOUS; SUBCUTANEOUS at 08:06

## 2020-06-11 NOTE — NURSING
"Pt called c/o discomfort. Upon checking pt explained, "My sugar might be going down. Can I have some peanut butter?" Shay crackers and peanut butter provided. Pt verbalized improved comfort after few minutes. Extra snack provided. Instructed to call again if if feels discomfort. Verbalized understanding.  "

## 2020-06-11 NOTE — PLAN OF CARE
06/11/20 0927   Post-Acute Status   Post-Acute Authorization Placement   Post-Acute Placement Status Referrals Sent   Patient choice form signed by patient/caregiver List with quality metrics by geographic area provided   Discharge Delays None known at this time   Discharge Plan   Discharge Plan A Long-term acute care facility (LTAC)   Discharge Plan B Long-term acute care facility (LTAC)     Patient choice form signed and scanned into media, referral sent to [facility] , Eddie working the patient up.  Cm to [facility] has accepted patient pending auth and bed availability eddie informed me possible tomorrow transfer.

## 2020-06-11 NOTE — PLAN OF CARE
Problem: Wound  Goal: Optimal Wound Healing  Outcome: Ongoing, Not Progressing     Problem: Adult Inpatient Plan of Care  Goal: Plan of Care Review  Outcome: Ongoing, Not Progressing  Goal: Patient-Specific Goal (Individualization)  Outcome: Ongoing, Not Progressing  Goal: Absence of Hospital-Acquired Illness or Injury  Outcome: Ongoing, Not Progressing  Goal: Optimal Comfort and Wellbeing  Outcome: Ongoing, Not Progressing  Goal: Readiness for Transition of Care  Outcome: Ongoing, Not Progressing  Goal: Rounds/Family Conference  Outcome: Ongoing, Not Progressing     Problem: Diabetes Comorbidity  Goal: Blood Glucose Level Within Desired Range  Outcome: Ongoing, Not Progressing     Problem: Adjustment to Illness (Sepsis/Septic Shock)  Goal: Optimal Coping  Outcome: Ongoing, Not Progressing     Problem: Bleeding (Sepsis/Septic Shock)  Goal: Absence of Bleeding  Outcome: Ongoing, Not Progressing     Problem: Glycemic Control Impaired (Sepsis/Septic Shock)  Goal: Blood Glucose Level Within Desired Range  Outcome: Ongoing, Not Progressing     Problem: Hemodynamic Instability (Sepsis/Septic Shock)  Goal: Effective Tissue Perfusion  Outcome: Ongoing, Not Progressing     Problem: Infection (Sepsis/Septic Shock)  Goal: Absence of Infection Signs/Symptoms  Outcome: Ongoing, Not Progressing     Problem: Nutrition Impaired (Sepsis/Septic Shock)  Goal: Optimal Nutrition Intake  Outcome: Ongoing, Not Progressing     Problem: Respiratory Compromise (Sepsis/Septic Shock)  Goal: Effective Oxygenation and Ventilation  Outcome: Ongoing, Not Progressing     Problem: Fall Injury Risk  Goal: Absence of Fall and Fall-Related Injury  Outcome: Ongoing, Not Progressing     Problem: Infection  Goal: Infection Symptom Resolution  Outcome: Ongoing, Not Progressing

## 2020-06-12 LAB
CRP SERPL-MCNC: 3.36 MG/DL
GLUCOSE SERPL-MCNC: 167 MG/DL (ref 70–110)
GLUCOSE SERPL-MCNC: 177 MG/DL (ref 70–110)
GLUCOSE SERPL-MCNC: 193 MG/DL (ref 70–110)
GLUCOSE SERPL-MCNC: 204 MG/DL (ref 70–110)
VANCOMYCIN TROUGH SERPL-MCNC: 39.2 UG/ML (ref 10–22)

## 2020-06-12 PROCEDURE — 25000003 PHARM REV CODE 250: Performed by: FAMILY MEDICINE

## 2020-06-12 PROCEDURE — 80202 ASSAY OF VANCOMYCIN: CPT

## 2020-06-12 PROCEDURE — 63600175 PHARM REV CODE 636 W HCPCS: Performed by: INTERNAL MEDICINE

## 2020-06-12 PROCEDURE — 63600175 PHARM REV CODE 636 W HCPCS: Performed by: NURSE PRACTITIONER

## 2020-06-12 PROCEDURE — 36415 COLL VENOUS BLD VENIPUNCTURE: CPT

## 2020-06-12 PROCEDURE — 25000003 PHARM REV CODE 250: Performed by: NURSE PRACTITIONER

## 2020-06-12 PROCEDURE — 86140 C-REACTIVE PROTEIN: CPT

## 2020-06-12 PROCEDURE — 25000003 PHARM REV CODE 250: Performed by: INTERNAL MEDICINE

## 2020-06-12 PROCEDURE — 12000002 HC ACUTE/MED SURGE SEMI-PRIVATE ROOM

## 2020-06-12 RX ADMIN — INSULIN DETEMIR 25 UNITS: 100 INJECTION, SOLUTION SUBCUTANEOUS at 09:06

## 2020-06-12 RX ADMIN — FAMOTIDINE 20 MG: 20 TABLET, FILM COATED ORAL at 08:06

## 2020-06-12 RX ADMIN — HYDROCODONE BITARTRATE AND ACETAMINOPHEN 1 TABLET: 5; 325 TABLET ORAL at 09:06

## 2020-06-12 RX ADMIN — VANCOMYCIN HYDROCHLORIDE 1250 MG: 1 INJECTION, POWDER, LYOPHILIZED, FOR SOLUTION INTRAVENOUS at 04:06

## 2020-06-12 RX ADMIN — IBUPROFEN 400 MG: 400 TABLET ORAL at 08:06

## 2020-06-12 RX ADMIN — MORPHINE SULFATE 2 MG: 2 INJECTION, SOLUTION INTRAMUSCULAR; INTRAVENOUS at 04:06

## 2020-06-12 RX ADMIN — HYDROCODONE BITARTRATE AND ACETAMINOPHEN 1 TABLET: 5; 325 TABLET ORAL at 05:06

## 2020-06-12 RX ADMIN — MORPHINE SULFATE 2 MG: 2 INJECTION, SOLUTION INTRAMUSCULAR; INTRAVENOUS at 09:06

## 2020-06-12 RX ADMIN — IBUPROFEN 400 MG: 400 TABLET ORAL at 09:06

## 2020-06-12 RX ADMIN — HYDROCODONE BITARTRATE AND ACETAMINOPHEN 1 TABLET: 5; 325 TABLET ORAL at 01:06

## 2020-06-12 RX ADMIN — FAMOTIDINE 20 MG: 20 TABLET, FILM COATED ORAL at 09:06

## 2020-06-12 RX ADMIN — IBUPROFEN 400 MG: 400 TABLET ORAL at 04:06

## 2020-06-12 NOTE — CARE UPDATE
Cm spoke with eddie from [facility] we are still in the auth process.  Cm to follow

## 2020-06-12 NOTE — PLAN OF CARE
06/12/20 1505   Post-Acute Status   Post-Acute Authorization Placement   Post-Acute Placement Status Accepted Pending Bed Availability       [facility] will have bed in morning cm to call eddie when pt discharges  Tomorrow.
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
## 2020-06-12 NOTE — PROGRESS NOTES
Pharmacokinetic Assessment Follow Up: IV Vancomycin    Vancomycin serum concentration assessment(s):    The trough level was drawn correctly and can be used to guide therapy at this time. The measurement is within the desired definitive target range of 15 to 20 mcg/mL.    Vancomycin Regimen Plan:    Continue regimen to Vancomycin 1250 mg IV every 8 hours with next serum trough concentration measured at 19:00 prior to 4th dose on 6-12-20     Drug levels (last 3 results):  Recent Labs   Lab Result Units 06/10/20  0058 06/10/20  1806 06/11/20  1951   Vancomycin-Trough ug/mL 7.0* 12.4 16.4       Pharmacy will continue to follow and monitor vancomycin.    Please contact pharmacy at extension [phone] for questions regarding this assessment.    Thank you for the consult,   Ritesh Kinsey       Patient brief summary:  Flako Quintana is a 39 y.o. male initiated on antimicrobial therapy with IV Vancomycin for treatment of bacteremia    The patient's current regimen is 1250 mg q8h    Drug Allergies:   Review of patient's allergies indicates:  No Known Allergies    Actual Body Weight:   79.7 kg    Renal Function:   Estimated Creatinine Clearance: 161.3 mL/min (based on SCr of 0.7 mg/dL).,     Dialysis Method (if applicable):  N/A    CBC (last 72 hours):  Recent Labs   Lab Result Units 06/09/20  0456 06/10/20  0544 06/11/20  0528   WBC K/uL 4.94 5.54 5.17   Hemoglobin g/dL 10.2* 9.7* 9.2*   Hemoglobin A1C % 6.5*  --   --    Hematocrit % 32.4* 30.4* 29.0*   Platelets K/uL 106* 118* 133*   Gran% % 67.3 55.6 55.1   Lymph% % 17.6* 27.4 25.3   Mono% % 12.1 13.9 15.9*   Eosinophil% % 2.2 2.2 2.7   Basophil% % 0.4 0.5 0.6   Differential Method  Automated Automated Automated       Metabolic Panel (last 72 hours):  Recent Labs   Lab Result Units 06/09/20  0456 06/10/20  0544 06/11/20  0528   Sodium mmol/L 137 139 135*   Potassium mmol/L 3.4* 3.3* 3.7   Chloride mmol/L 107 109 106   CO2 mmol/L 22* 24 20*   Glucose mg/dL 170* 66* 162*   BUN,  Bld mg/dL 7 7 10   Creatinine mg/dL 0.6 0.6 0.7   Magnesium mg/dL 1.6 1.6 1.6       Vancomycin Administrations:  vancomycin given in the last 96 hours                     vancomycin (VANCOCIN) 1,250 mg in dextrose 5 % 250 mL IVPB (mg) 1,250 mg New Bag 06/11/20 2054     1,250 mg New Bag  1200     1,250 mg New Bag  0405    vancomycin (VANCOCIN) 1,250 mg in dextrose 5 % 250 mL IVPB (mg) 1,250 mg New Bag 06/10/20 2029     1,250 mg New Bag  0930    vancomycin (VANCOCIN) 1,250 mg in dextrose 5 % 250 mL IVPB (mg) 1,250 mg New Bag 06/10/20 0127     1,250 mg New Bag 06/09/20 1548     1,250 mg New Bag  0255                      Microbiologic Results:  Microbiology Results (last 7 days)       Procedure Component Value Units Date/Time    Blood culture [[id number]] Collected:  06/08/20 1411    Order Status:  Completed Specimen:  Blood from Peripheral, Right Hand Updated:  06/11/20 1632     Blood Culture, Routine No Growth to date      No Growth to date      No Growth to date      No Growth to date    Blood culture [[id number]] Collected:  06/10/20 0544    Order Status:  Completed Specimen:  Blood from Peripheral, Right Hand Updated:  06/11/20 0632     Blood Culture, Routine No Growth to date      No Growth to date    Blood Culture #1 **CANNOT BE ORDERED STAT** [[id number]] Collected:  06/06/20 2142    Order Status:  Completed Specimen:  Blood from Peripheral, Upper Arm, Right Updated:  06/10/20 2232     Blood Culture, Routine No Growth to date      No Growth to date      No Growth to date      No Growth to date      No Growth to date    Blood Culture #2 **CANNOT BE ORDERED STAT** [[id number]]  (Abnormal)  (Susceptibility) Collected:  06/06/20 2232    Order Status:  Completed Specimen:  Blood from Peripheral, Upper Arm, Right Updated:  06/09/20 0642     Blood Culture, Routine Gram stain luis felipe bottle: Gram positive cocci      Results called to and read back by:CECE Aldana 1E  06/07/2020        14:12 JT      METHICILLIN RESISTANT  STAPHYLOCOCCUS AUREUS  Results called to and read back by:Leigh Powell LPN 11MEDSU    06/08/2020  09:06 SHAINA

## 2020-06-12 NOTE — PROGRESS NOTES
[facility] Medicine  Progress Note    Patient name: Flako Quintana  MRN: [id number]  Admit Date: 6/6/2020   LOS: 5 days     SUBJECTIVE:     Principal problem: Cellulitis of right lower extremity    Interval History:  Pt feels better, r/leg swelling improving,   Accepted to LTAC  Awaiting for medication authorization for IV antibiotics    Scheduled Meds:   famotidine  20 mg Oral BID    ibuprofen  400 mg Oral TID    insulin detemir U-100  25 Units Subcutaneous QHS    vancomycin (VANCOCIN) IVPB  1,250 mg Intravenous Q8H     Continuous Infusions:  PRN Meds:acetaminophen, acetaminophen, dextrose 50%, dextrose 50%, glucagon (human recombinant), glucose, glucose, hydrALAZINE, HYDROcodone-acetaminophen, insulin aspart U-100, labetalol, magnesium oxide, magnesium oxide, melatonin, morphine, ondansetron, potassium chloride 10%, potassium chloride 10%, potassium chloride 10%, potassium, sodium phosphates, potassium, sodium phosphates, potassium, sodium phosphates, promethazine (PHENERGAN) IVPB, sodium chloride 0.9%, Pharmacy to dose Vancomycin consult **AND** vancomycin - pharmacy to dose    Review of patient's allergies indicates:  No Known Allergies    Review of Systems: As per interval history    OBJECTIVE:     Vital Signs (Most Recent)  Temp: 98.6 °F (37 °C) (06/11/20 1954)  Pulse: 96 (06/11/20 1954)  Resp: 17 (06/11/20 1954)  BP: (!) 143/91 (06/11/20 1954)  SpO2: 100 % (06/11/20 1954)    Vital Signs Range (Last 24H):  Temp:  [98.3 °F (36.8 °C)-99.3 °F (37.4 °C)]   Pulse:  []   Resp:  [17-19]   BP: (135-150)/(84-91)   SpO2:  [98 %-100 %]     I & O (Last 24H):    Intake/Output Summary (Last 24 hours) at 6/11/2020 2040  Last data filed at 6/11/2020 1516  Gross per 24 hour   Intake 3520 ml   Output 2921 ml   Net 599 ml       Physical Exam:  General: Patient resting comfortably in no acute distress. Appears as stated age. Calm  Eyes: No conjunctival injection. No scleral icterus.  ENT: Hearing  grossly intact. No discharge from ears. No nasal discharge.   Neck: Supple, trachea midline. No JVD  CVS: RRR. No LE edema BL  Lungs: CTA BL, no wheezing or crackles. Good breath sounds. No accessory muscle use. No acute respiratory distress  Abdomen:  Soft, nontender and nondistended.  No organomegaly  Neuro: AOx3. Moves all extremities. Follows commands. Responds appropriately   Skin:  No rash or erythema noted    Laboratory:  CBC:   Recent Labs   Lab 06/11/20  0528   WBC 5.17   RBC 3.43*   HGB 9.2*   HCT 29.0*   *   MCV 85   MCH 26.8*   MCHC 31.7*     CMP:   Recent Labs   Lab 06/06/20  2141  06/11/20  0528   *   < > 162*   CALCIUM 8.2*   < > 8.4*   ALBUMIN 2.8*  --   --    PROT 7.0  --   --    *   < > 135*   K 3.7   < > 3.7   CO2 19*   < > 20*   CL 99   < > 106   BUN 6   < > 10   CREATININE 0.8   < > 0.7   ALKPHOS 184*  --   --    ALT 20  --   --    AST 33  --   --    BILITOT 3.0*  --   --     < > = values in this interval not displayed.         ASSESSMENT/PLAN:       Active Hospital Problems    Diagnosis  POA    *Cellulitis of right lower extremity [L03.115]  Yes    MRSA bacteremia [R78.81]  Yes    Polysubstance abuse [F19.10]  Yes    Sepsis [A41.9]  Yes    Diabetic foot ulcer [E11.621, L97.509]  Yes    Cigarette nicotine dependence without complication [F17.210]  Yes    Type 2 diabetes mellitus with hyperglycemia, with long-term current use of insulin [E11.65, Z79.4]  Not Applicable     Chronic    Thrombocytopenia [D69.6]  Yes      Resolved Hospital Problems   No resolved problems to display.         Plan:   Cellulitis of right lower extremity with MRSA bacteremia  Sepsis resolved  Negative for DVT  Prev IV clindamycin. Now IV vanco Daily CRP  Continue medications. ISS  Repeat BCx ordered  Pain control  Case discussed with orthopedic surgeon, Dr. Winkler.  Low suspicion for septic joint.   MRI foot planned today  Echo no vegetations documented     Consult infectious disease  Consult  orthopedic surgery  Consult podiatry  Appreciate consult     Hold Lovenox in the setting of possible hematoma on MRI      VTE Risk Mitigation (From admission, onward)         Ordered     IP VTE LOW RISK PATIENT  Once      06/06/20 2319     Place sequential compression device  Until discontinued      06/06/20 2319                  Department Hospital Medicine  [facility]  Franny Lee MD  Date of service: 06/11/2020

## 2020-06-12 NOTE — PROGRESS NOTES
Replaced by [facility] Medicine  Progress Note    Patient name: Flako Quintana  MRN: [id number]  Admit Date: 6/6/2020   LOS: 6 days     SUBJECTIVE:     Principal problem: Cellulitis of right lower extremity    Interval History:  Pt feels better, r/leg swelling almost resolved  Accepted to LTAC  Awaiting for medication authorization for IV antibiotics  Possible discharge tomorrow  Patient got midline for IV antibiotic    Scheduled Meds:   famotidine  20 mg Oral BID    ibuprofen  400 mg Oral TID    insulin detemir U-100  25 Units Subcutaneous QHS    vancomycin (VANCOCIN) IVPB  1,250 mg Intravenous Q8H     Continuous Infusions:  PRN Meds:acetaminophen, acetaminophen, dextrose 50%, dextrose 50%, glucagon (human recombinant), glucose, glucose, hydrALAZINE, HYDROcodone-acetaminophen, insulin aspart U-100, labetalol, magnesium oxide, magnesium oxide, melatonin, morphine, ondansetron, potassium chloride 10%, potassium chloride 10%, potassium chloride 10%, potassium, sodium phosphates, potassium, sodium phosphates, potassium, sodium phosphates, promethazine (PHENERGAN) IVPB, sodium chloride 0.9%, Pharmacy to dose Vancomycin consult **AND** vancomycin - pharmacy to dose    Review of patient's allergies indicates:  No Known Allergies    Review of Systems: As per interval history    OBJECTIVE:     Vital Signs (Most Recent)  Temp: 98.3 °F (36.8 °C) (06/12/20 1200)  Pulse: 81 (06/12/20 1200)  Resp: 18 (06/12/20 1200)  BP: 137/85 (06/12/20 1200)  SpO2: 100 % (06/12/20 1200)    Vital Signs Range (Last 24H):  Temp:  [98.3 °F (36.8 °C)-98.7 °F (37.1 °C)]   Pulse:  [81-96]   Resp:  [16-18]   BP: (125-162)/(76-91)   SpO2:  [98 %-100 %]     I & O (Last 24H):    Intake/Output Summary (Last 24 hours) at 6/12/2020 1619  Last data filed at 6/12/2020 0000  Gross per 24 hour   Intake --   Output 600 ml   Net -600 ml       Physical Exam:  General: Patient resting comfortably in no acute distress. Appears as stated age.  Calm  Eyes: No conjunctival injection. No scleral icterus.  ENT: Hearing grossly intact. No discharge from ears. No nasal discharge.   Neck: Supple, trachea midline. No JVD  CVS: RRR. No LE edema BL  Lungs: CTA BL, no wheezing or crackles. Good breath sounds. No accessory muscle use. No acute respiratory distress  Abdomen:  Soft, nontender and nondistended.  No organomegaly  Neuro: AOx3. Moves all extremities. Follows commands. Responds appropriately   Skin:  No rash or erythema noted    Laboratory:  CBC:   Recent Labs   Lab 06/11/20  0528   WBC 5.17   RBC 3.43*   HGB 9.2*   HCT 29.0*   *   MCV 85   MCH 26.8*   MCHC 31.7*     CMP:   Recent Labs   Lab 06/06/20  2141  06/11/20  0528   *   < > 162*   CALCIUM 8.2*   < > 8.4*   ALBUMIN 2.8*  --   --    PROT 7.0  --   --    *   < > 135*   K 3.7   < > 3.7   CO2 19*   < > 20*   CL 99   < > 106   BUN 6   < > 10   CREATININE 0.8   < > 0.7   ALKPHOS 184*  --   --    ALT 20  --   --    AST 33  --   --    BILITOT 3.0*  --   --     < > = values in this interval not displayed.         ASSESSMENT/PLAN:       Active Hospital Problems    Diagnosis  POA    *Cellulitis of right lower extremity [L03.115]  Yes    MRSA bacteremia [R78.81]  Yes    Polysubstance abuse [F19.10]  Yes    Sepsis [A41.9]  Yes    Diabetic foot ulcer [E11.621, L97.509]  Yes    Cigarette nicotine dependence without complication [F17.210]  Yes    Type 2 diabetes mellitus with hyperglycemia, with long-term current use of insulin [E11.65, Z79.4]  Not Applicable     Chronic    Thrombocytopenia [D69.6]  Yes      Resolved Hospital Problems   No resolved problems to display.         Plan:   Cellulitis of right lower extremity with MRSA bacteremia  Sepsis resolved  Negative for DVT  Prev IV clindamycin. Now IV vanco Daily CRP  Continue medications. ISS  Repeat BCx ordered  Pain control  Case discussed with orthopedic surgeon, Dr. Winkler.  Low suspicion for septic joint.   MRI foot planned  today  Echo no vegetations documented     Consult infectious disease  Consult orthopedic surgery  Consult podiatry  Appreciate consult     Hold Lovenox in the setting of possible hematoma on MRI      VTE Risk Mitigation (From admission, onward)         Ordered     IP VTE LOW RISK PATIENT  Once      06/06/20 2319     Place sequential compression device  Until discontinued      06/06/20 2319                  Department Hospital Medicine  [facility]  Franny Lee MD  Date of service: 06/12/2020

## 2020-06-13 VITALS
BODY MASS INDEX: 23.28 KG/M2 | OXYGEN SATURATION: 94 % | HEART RATE: 98 BPM | RESPIRATION RATE: 20 BRPM | HEIGHT: 73 IN | TEMPERATURE: 99 F | SYSTOLIC BLOOD PRESSURE: 124 MMHG | DIASTOLIC BLOOD PRESSURE: 73 MMHG | WEIGHT: 175.69 LBS

## 2020-06-13 LAB
BACTERIA BLD CULT: NORMAL
CRP SERPL-MCNC: 2.92 MG/DL
GLUCOSE SERPL-MCNC: 69 MG/DL (ref 70–110)
VANCOMYCIN TROUGH SERPL-MCNC: 14.5 UG/ML (ref 10–22)

## 2020-06-13 PROCEDURE — 25000003 PHARM REV CODE 250: Performed by: FAMILY MEDICINE

## 2020-06-13 PROCEDURE — 25000003 PHARM REV CODE 250: Performed by: NURSE PRACTITIONER

## 2020-06-13 PROCEDURE — 86140 C-REACTIVE PROTEIN: CPT

## 2020-06-13 PROCEDURE — 25000003 PHARM REV CODE 250: Performed by: INTERNAL MEDICINE

## 2020-06-13 PROCEDURE — 80202 ASSAY OF VANCOMYCIN: CPT

## 2020-06-13 PROCEDURE — 63600175 PHARM REV CODE 636 W HCPCS: Performed by: NURSE PRACTITIONER

## 2020-06-13 PROCEDURE — 36415 COLL VENOUS BLD VENIPUNCTURE: CPT

## 2020-06-13 PROCEDURE — 63600175 PHARM REV CODE 636 W HCPCS: Performed by: INTERNAL MEDICINE

## 2020-06-13 RX ADMIN — MORPHINE SULFATE 2 MG: 2 INJECTION, SOLUTION INTRAMUSCULAR; INTRAVENOUS at 12:06

## 2020-06-13 RX ADMIN — FAMOTIDINE 20 MG: 20 TABLET, FILM COATED ORAL at 09:06

## 2020-06-13 RX ADMIN — IBUPROFEN 400 MG: 400 TABLET ORAL at 09:06

## 2020-06-13 RX ADMIN — HYDROCODONE BITARTRATE AND ACETAMINOPHEN 1 TABLET: 5; 325 TABLET ORAL at 04:06

## 2020-06-13 RX ADMIN — VANCOMYCIN HYDROCHLORIDE 1250 MG: 1 INJECTION, POWDER, LYOPHILIZED, FOR SOLUTION INTRAVENOUS at 10:06

## 2020-06-13 RX ADMIN — MORPHINE SULFATE 2 MG: 2 INJECTION, SOLUTION INTRAMUSCULAR; INTRAVENOUS at 07:06

## 2020-06-13 RX ADMIN — MORPHINE SULFATE 2 MG: 2 INJECTION, SOLUTION INTRAMUSCULAR; INTRAVENOUS at 11:06

## 2020-06-13 RX ADMIN — VANCOMYCIN HYDROCHLORIDE 1250 MG: 1 INJECTION, POWDER, LYOPHILIZED, FOR SOLUTION INTRAVENOUS at 12:06

## 2020-06-13 NOTE — PROGRESS NOTES
Pharmacokinetic Assessment Follow Up: IV Vancomycin    Vancomycin serum concentration assessment(s):    The trough level was drawn correctly and can be used to guide therapy at this time. The measurement is above the desired definitive target range of 15 to 20 mcg/mL.    Vancomycin Regimen Plan:    Continue regimen to Vancomycin 1250 mg IV every 8 hours with next serum trough concentration measured at 0800 prior to 2nd dose on 06/13     Drug levels (last 3 results):  Recent Labs   Lab Result Units 06/10/20  1806 06/11/20  1951 06/12/20  1858   Vancomycin-Trough ug/mL 12.4 16.4 39.2*       Pharmacy will continue to follow and monitor vancomycin.    Please contact pharmacy at extension [phone] for questions regarding this assessment.    Thank you for the consult,   Terry Harmon       Patient brief summary:  Flako Quintana is a 39 y.o. male initiated on antimicrobial therapy with IV Vancomycin for treatment of bacteremia    The patient's current regimen is Vancomycin 1250 mg IV Q8H with next dose at 0100 on 06/13    Drug Allergies:   Review of patient's allergies indicates:  No Known Allergies    Actual Body Weight:   79.7 kg    Renal Function:   Estimated Creatinine Clearance: 161.3 mL/min (based on SCr of 0.7 mg/dL).,     CBC (last 72 hours):  Recent Labs   Lab Result Units 06/10/20  0544 06/11/20  0528   WBC K/uL 5.54 5.17   Hemoglobin g/dL 9.7* 9.2*   Hematocrit % 30.4* 29.0*   Platelets K/uL 118* 133*   Gran% % 55.6 55.1   Lymph% % 27.4 25.3   Mono% % 13.9 15.9*   Eosinophil% % 2.2 2.7   Basophil% % 0.5 0.6   Differential Method  Automated Automated       Metabolic Panel (last 72 hours):  Recent Labs   Lab Result Units 06/10/20  0544 06/11/20  0528   Sodium mmol/L 139 135*   Potassium mmol/L 3.3* 3.7   Chloride mmol/L 109 106   CO2 mmol/L 24 20*   Glucose mg/dL 66* 162*   BUN, Bld mg/dL 7 10   Creatinine mg/dL 0.6 0.7   Magnesium mg/dL 1.6 1.6       Vancomycin Administrations:  vancomycin given in the last 96 hours                      vancomycin (VANCOCIN) 1,250 mg in dextrose 5 % 250 mL IVPB (mg) 1,250 mg New Bag 06/12/20 1649     1,250 mg New Bag  0420     1,250 mg New Bag 06/11/20 2054     1,250 mg New Bag  1200     1,250 mg New Bag  0405    vancomycin (VANCOCIN) 1,250 mg in dextrose 5 % 250 mL IVPB (mg) 1,250 mg New Bag 06/10/20 2029     1,250 mg New Bag  0930                      Microbiologic Results:  Microbiology Results (last 7 days)       Procedure Component Value Units Date/Time    Blood culture [[id number]] Collected:  06/08/20 1411    Order Status:  Completed Specimen:  Blood from Peripheral, Right Hand Updated:  06/12/20 1632     Blood Culture, Routine No Growth to date      No Growth to date      No Growth to date      No Growth to date      No Growth to date    Blood culture [[id number]] Collected:  06/10/20 0544    Order Status:  Completed Specimen:  Blood from Peripheral, Right Hand Updated:  06/12/20 0632     Blood Culture, Routine No Growth to date      No Growth to date      No Growth to date    Blood Culture #1 **CANNOT BE ORDERED STAT** [[id number]] Collected:  06/06/20 2142    Order Status:  Completed Specimen:  Blood from Peripheral, Upper Arm, Right Updated:  06/11/20 2232     Blood Culture, Routine No growth after 5 days.    Blood Culture #2 **CANNOT BE ORDERED STAT** [[id number]]  (Abnormal)  (Susceptibility) Collected:  06/06/20 2232    Order Status:  Completed Specimen:  Blood from Peripheral, Upper Arm, Right Updated:  06/09/20 0642     Blood Culture, Routine Gram stain luis felipe bottle: Gram positive cocci      Results called to and read back by:CECE Aldana 1E  06/07/2020        14:12 JT      METHICILLIN RESISTANT STAPHYLOCOCCUS AUREUS  Results called to and read back by:Leigh Powell LPN 11MEDSU    06/08/2020  09:06 JBM
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
## 2020-06-13 NOTE — PROGRESS NOTES
Pharmacokinetic Assessment Follow Up: IV Vancomycin    Vancomycin serum concentration assessment(s):    The trough level was drawn late but can be used to guide therapy at this time. The measurement is below the desired definitive target range of 15 to 20 mcg/mL. It is expected the trough level would have been within target range had it been drawn on time.    Vancomycin Regimen Plan:    Continue regimen to Vancomycin 1250 mg IV every 8 hours with next serum trough concentration measured at 08:00 prior to 09:00 dose on 6/15.    Drug levels (last 3 results):  Recent Labs   Lab Result Units 06/11/20  1951 06/12/20  1858 06/13/20  0940   Vancomycin-Trough ug/mL 16.4 39.2* 14.5       Pharmacy will continue to follow and monitor vancomycin.    Please contact pharmacy at extension [phone] for questions regarding this assessment.    Thank you for the consult,   Martin Paulson       Patient brief summary:  Flako Quintana is a 39 y.o. male initiated on antimicrobial therapy with IV Vancomycin for treatment of bacteremia    The patient's current regimen is vancomycin 1250 mg IV Q 8 hrs.    Drug Allergies:   Review of patient's allergies indicates:  No Known Allergies    Actual Body Weight:   79.7 kg    Renal Function:   Estimated Creatinine Clearance: 161.3 mL/min (based on SCr of 0.7 mg/dL).,     Dialysis Method (if applicable):  N/A    CBC (last 72 hours):  Recent Labs   Lab Result Units 06/11/20  0528   WBC K/uL 5.17   Hemoglobin g/dL 9.2*   Hematocrit % 29.0*   Platelets K/uL 133*   Gran% % 55.1   Lymph% % 25.3   Mono% % 15.9*   Eosinophil% % 2.7   Basophil% % 0.6   Differential Method  Automated       Metabolic Panel (last 72 hours):  Recent Labs   Lab Result Units 06/11/20  0528   Sodium mmol/L 135*   Potassium mmol/L 3.7   Chloride mmol/L 106   CO2 mmol/L 20*   Glucose mg/dL 162*   BUN, Bld mg/dL 10   Creatinine mg/dL 0.7   Magnesium mg/dL 1.6       Vancomycin Administrations:  vancomycin given in the last 96 hours                      vancomycin (VANCOCIN) 1,250 mg in dextrose 5 % 250 mL IVPB (mg) 1,250 mg New Bag 06/13/20 1048     1,250 mg New Bag  0034     1,250 mg New Bag 06/12/20 1649     1,250 mg New Bag  0420     1,250 mg New Bag 06/11/20 2054     1,250 mg New Bag  1200     1,250 mg New Bag  0405    vancomycin (VANCOCIN) 1,250 mg in dextrose 5 % 250 mL IVPB (mg) 1,250 mg New Bag 06/10/20 2029     1,250 mg New Bag  0930    vancomycin (VANCOCIN) 1,250 mg in dextrose 5 % 250 mL IVPB (mg) 1,250 mg New Bag 06/10/20 0127     1,250 mg New Bag 06/09/20 1548                    Microbiologic Results:  Microbiology Results (last 7 days)       Procedure Component Value Units Date/Time    Blood culture [[id number]] Collected: 06/10/20 0544    Order Status: Completed Specimen: Blood from Peripheral, Right Hand Updated: 06/13/20 0632     Blood Culture, Routine No Growth to date      No Growth to date      No Growth to date      No Growth to date    Blood culture [[id number]] Collected: 06/08/20 1411    Order Status: Completed Specimen: Blood from Peripheral, Right Hand Updated: 06/12/20 1632     Blood Culture, Routine No Growth to date      No Growth to date      No Growth to date      No Growth to date      No Growth to date    Blood Culture #1 **CANNOT BE ORDERED STAT** [[id number]] Collected: 06/06/20 2142    Order Status: Completed Specimen: Blood from Peripheral, Upper Arm, Right Updated: 06/11/20 2232     Blood Culture, Routine No growth after 5 days.    Blood Culture #2 **CANNOT BE ORDERED STAT** [[id number]]  (Abnormal)  (Susceptibility) Collected: 06/06/20 2232    Order Status: Completed Specimen: Blood from Peripheral, Upper Arm, Right Updated: 06/09/20 0642     Blood Culture, Routine Gram stain luis felipe bottle: Gram positive cocci      Results called to and read back by:CECE Aldana 1E  06/07/2020        14:12 JT      METHICILLIN RESISTANT STAPHYLOCOCCUS AUREUS  Results called to and read back by:Leigh Powell LPN [id number]     06/08/2020  09:06 SHAINA

## 2020-06-13 NOTE — PLAN OF CARE
06/13/20 1026   Final Note   Assessment Type Final Discharge Note   Anticipated Discharge Disposition LTAC   Post-Acute Status   Post-Acute Authorization Placement   Post-Acute Placement Status Set-up Complete   Discharge Delays None known at this time     Saturday admit 6/13--He is going to [facility] . Report can be called to [id number]. Wheelchair pickup is tentatively set for 11:00 am.    Nurse and MD aware. D/C summary and orders sent via [facility] to [facility].

## 2020-06-15 LAB — BACTERIA BLD CULT: NORMAL

## 2020-06-16 PROBLEM — S81.801A LEG WOUND, RIGHT, INITIAL ENCOUNTER: Status: ACTIVE | Noted: 2020-06-16

## 2020-07-01 ENCOUNTER — OFFICE VISIT (OUTPATIENT)
Dept: INFECTIOUS DISEASES | Facility: CLINIC | Age: 40
End: 2020-07-01
Payer: MEDICAID

## 2020-07-01 VITALS
SYSTOLIC BLOOD PRESSURE: 130 MMHG | HEART RATE: 102 BPM | WEIGHT: 179 LBS | OXYGEN SATURATION: 98 % | TEMPERATURE: 98 F | BODY MASS INDEX: 23.62 KG/M2 | DIASTOLIC BLOOD PRESSURE: 79 MMHG

## 2020-07-01 DIAGNOSIS — L03.115 CELLULITIS OF RIGHT LEG: ICD-10-CM

## 2020-07-01 DIAGNOSIS — Z79.2 ENCOUNTER FOR LONG-TERM (CURRENT) USE OF ANTIBIOTICS: ICD-10-CM

## 2020-07-01 DIAGNOSIS — L97.512 DIABETIC ULCER OF RIGHT FOOT ASSOCIATED WITH TYPE 2 DIABETES MELLITUS, WITH FAT LAYER EXPOSED, UNSPECIFIED PART OF FOOT: ICD-10-CM

## 2020-07-01 DIAGNOSIS — E11.621 DIABETIC ULCER OF RIGHT FOOT ASSOCIATED WITH TYPE 2 DIABETES MELLITUS, WITH FAT LAYER EXPOSED, UNSPECIFIED PART OF FOOT: ICD-10-CM

## 2020-07-01 DIAGNOSIS — Z79.4 TYPE 2 DIABETES MELLITUS WITH HYPERGLYCEMIA, WITH LONG-TERM CURRENT USE OF INSULIN: Chronic | ICD-10-CM

## 2020-07-01 DIAGNOSIS — K70.9 CHRONIC LIVER DISEASE DUE TO ALCOHOL: ICD-10-CM

## 2020-07-01 DIAGNOSIS — Z71.89 EDUCATED ABOUT COVID-19 VIRUS INFECTION: ICD-10-CM

## 2020-07-01 DIAGNOSIS — B95.62 MRSA BACTEREMIA: Primary | ICD-10-CM

## 2020-07-01 DIAGNOSIS — E11.65 TYPE 2 DIABETES MELLITUS WITH HYPERGLYCEMIA, WITH LONG-TERM CURRENT USE OF INSULIN: Chronic | ICD-10-CM

## 2020-07-01 DIAGNOSIS — R78.81 MRSA BACTEREMIA: Primary | ICD-10-CM

## 2020-07-01 PROCEDURE — 99214 PR OFFICE/OUTPT VISIT, EST, LEVL IV, 30-39 MIN: ICD-10-PCS | Mod: S$GLB,,, | Performed by: INTERNAL MEDICINE

## 2020-07-01 PROCEDURE — 99214 OFFICE O/P EST MOD 30 MIN: CPT | Mod: S$GLB,,, | Performed by: INTERNAL MEDICINE

## 2020-07-01 RX ORDER — ONDANSETRON 4 MG/1
TABLET, ORALLY DISINTEGRATING ORAL
COMMUNITY
Start: 2020-03-25

## 2020-07-01 RX ORDER — TRAMADOL HYDROCHLORIDE 50 MG/1
TABLET ORAL
COMMUNITY
Start: 2020-06-01

## 2020-07-01 RX ORDER — PANTOPRAZOLE SODIUM 40 MG/1
TABLET, DELAYED RELEASE ORAL
COMMUNITY
Start: 2020-05-18

## 2020-07-01 RX ORDER — DIAZEPAM 5 MG/1
TABLET ORAL
COMMUNITY
Start: 2020-06-03

## 2020-07-01 RX ORDER — POTASSIUM BICARBONATE 977.5 MG/1
TABLET, EFFERVESCENT ORAL
COMMUNITY
Start: 2020-03-26 | End: 2024-02-19

## 2020-07-01 RX ORDER — LIDOCAINE AND PRILOCAINE 25; 25 MG/G; MG/G
CREAM TOPICAL
COMMUNITY
Start: 2020-03-30

## 2020-07-01 RX ORDER — GABAPENTIN 300 MG/1
CAPSULE ORAL
COMMUNITY
Start: 2020-03-25

## 2020-07-01 RX ORDER — BUSPIRONE HYDROCHLORIDE 10 MG/1
TABLET ORAL
COMMUNITY
Start: 2020-03-30

## 2020-07-01 RX ORDER — MUPIROCIN 20 MG/G
OINTMENT TOPICAL
COMMUNITY
Start: 2020-04-11

## 2020-07-01 RX ORDER — METOPROLOL SUCCINATE 25 MG/1
TABLET, EXTENDED RELEASE ORAL
COMMUNITY
Start: 2020-05-30

## 2020-07-01 RX ORDER — METHYLPHENIDATE HYDROCHLORIDE 10 MG/1
TABLET ORAL
COMMUNITY
Start: 2020-03-25

## 2020-07-01 RX ORDER — CLINDAMYCIN HYDROCHLORIDE 300 MG/1
CAPSULE ORAL
COMMUNITY
Start: 2020-04-11

## 2020-07-01 RX ORDER — ESCITALOPRAM OXALATE 20 MG/1
TABLET ORAL
COMMUNITY
Start: 2020-05-30

## 2020-07-01 RX ORDER — LANOLIN ALCOHOL/MO/W.PET/CERES
CREAM (GRAM) TOPICAL
COMMUNITY
Start: 2020-03-30

## 2020-07-01 RX ORDER — DOCUSATE SODIUM 100 MG/1
CAPSULE, LIQUID FILLED ORAL
COMMUNITY
Start: 2020-06-03

## 2020-07-01 RX ORDER — MELOXICAM 15 MG/1
TABLET ORAL
COMMUNITY
Start: 2020-03-25

## 2020-07-01 RX ORDER — OXYCODONE AND ACETAMINOPHEN 5; 325 MG/1; MG/1
TABLET ORAL
COMMUNITY
Start: 2020-06-03

## 2020-07-01 RX ORDER — HYDROCODONE BITARTRATE AND ACETAMINOPHEN 5; 325 MG/1; MG/1
TABLET ORAL
COMMUNITY
Start: 2020-04-21

## 2020-07-01 RX ORDER — PEN NEEDLE, DIABETIC 31 GX5/16"
NEEDLE, DISPOSABLE MISCELLANEOUS
COMMUNITY
Start: 2020-03-25

## 2020-07-01 RX ORDER — HYDROXYZINE PAMOATE 25 MG/1
CAPSULE ORAL
COMMUNITY
Start: 2020-03-25

## 2020-07-01 RX ORDER — AMITRIPTYLINE HYDROCHLORIDE 50 MG/1
TABLET, FILM COATED ORAL
COMMUNITY
Start: 2020-03-25

## 2020-07-01 RX ORDER — DEXTROSE 4 G
TABLET,CHEWABLE ORAL
COMMUNITY
Start: 2020-03-26

## 2020-07-01 RX ORDER — ACETAMINOPHEN 325 MG/1
TABLET ORAL
COMMUNITY
Start: 2020-03-25

## 2020-07-01 RX ORDER — CEPHALEXIN 250 MG/1
CAPSULE ORAL
COMMUNITY
Start: 2020-04-11

## 2020-07-01 RX ORDER — CALCIUM CITRATE/VITAMIN D3 200MG-6.25
TABLET ORAL
COMMUNITY
Start: 2020-06-02

## 2020-07-01 NOTE — PATIENT INSTRUCTIONS
Please make an appointment with Dr. Lim, to address getting your blood sugar PERFECT  Please stop drinking as this makes your diabetes worse   Please get a new monitor for blood sugar and measure your blood sugar at least 2 times per day and write down the numbers so that you can show Dr. Lim.     Keep a padded bandage on your right foot . Try a spongy corn pad shaped like a donut and cover it. Change the bandage every day.     I am referring you to the [facility] wound care clinic, to Dr. Yesenia Buck, who is also a podiatrist.
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
## 2020-12-31 ENCOUNTER — HOSPITAL ENCOUNTER (EMERGENCY)
Facility: HOSPITAL | Age: 40
Discharge: HOME | End: 2020-12-31
Attending: EMERGENCY MEDICINE
Payer: MEDICAID

## 2020-12-31 VITALS
HEART RATE: 108 BPM | RESPIRATION RATE: 18 BRPM | DIASTOLIC BLOOD PRESSURE: 75 MMHG | HEIGHT: 73 IN | BODY MASS INDEX: 25.84 KG/M2 | WEIGHT: 195 LBS | SYSTOLIC BLOOD PRESSURE: 129 MMHG | TEMPERATURE: 99 F | OXYGEN SATURATION: 97 %

## 2020-12-31 DIAGNOSIS — K13.0 ABSCESS, LIP: Primary | ICD-10-CM

## 2020-12-31 PROCEDURE — 99284 EMERGENCY DEPT VISIT MOD MDM: CPT | Mod: ,,, | Performed by: PHYSICIAN ASSISTANT

## 2020-12-31 PROCEDURE — 99284 EMERGENCY DEPT VISIT MOD MDM: CPT

## 2020-12-31 PROCEDURE — 99284 PR EMERGENCY DEPT VISIT,LEVEL IV: ICD-10-PCS | Mod: ,,, | Performed by: PHYSICIAN ASSISTANT

## 2020-12-31 PROCEDURE — 25000003 PHARM REV CODE 250: Performed by: PHYSICIAN ASSISTANT

## 2020-12-31 RX ORDER — NAPROXEN 500 MG/1
500 TABLET ORAL
Status: COMPLETED | OUTPATIENT
Start: 2020-12-31 | End: 2020-12-31

## 2020-12-31 RX ORDER — SULFAMETHOXAZOLE AND TRIMETHOPRIM 800; 160 MG/1; MG/1
1 TABLET ORAL
Status: COMPLETED | OUTPATIENT
Start: 2020-12-31 | End: 2020-12-31

## 2020-12-31 RX ORDER — SULFAMETHOXAZOLE AND TRIMETHOPRIM 800; 160 MG/1; MG/1
1 TABLET ORAL 2 TIMES DAILY
Qty: 14 TABLET | Refills: 0 | Status: SHIPPED | OUTPATIENT
Start: 2020-12-31 | End: 2021-01-07

## 2020-12-31 RX ORDER — NAPROXEN 500 MG/1
500 TABLET ORAL 2 TIMES DAILY WITH MEALS
Qty: 20 TABLET | Refills: 0 | Status: SHIPPED | OUTPATIENT
Start: 2020-12-31

## 2020-12-31 RX ADMIN — SULFAMETHOXAZOLE AND TRIMETHOPRIM 1 TABLET: 800; 160 TABLET ORAL at 09:12

## 2020-12-31 RX ADMIN — NAPROXEN 500 MG: 500 TABLET ORAL at 09:12

## 2024-02-19 ENCOUNTER — HOSPITAL ENCOUNTER (EMERGENCY)
Facility: HOSPITAL | Age: 44
Discharge: HOME | End: 2024-02-19
Attending: EMERGENCY MEDICINE
Payer: MEDICAID

## 2024-02-19 VITALS
BODY MASS INDEX: 25.04 KG/M2 | TEMPERATURE: 99 F | DIASTOLIC BLOOD PRESSURE: 88 MMHG | SYSTOLIC BLOOD PRESSURE: 180 MMHG | HEIGHT: 74 IN | OXYGEN SATURATION: 97 % | HEART RATE: 107 BPM | RESPIRATION RATE: 18 BRPM

## 2024-02-19 DIAGNOSIS — E83.42 HYPOMAGNESEMIA: Primary | ICD-10-CM

## 2024-02-19 DIAGNOSIS — F10.930 ALCOHOL WITHDRAWAL SYNDROME WITHOUT COMPLICATION: ICD-10-CM

## 2024-02-19 DIAGNOSIS — E87.6 HYPOKALEMIA: ICD-10-CM

## 2024-02-19 DIAGNOSIS — R03.0 BLOOD PRESSURE ELEVATED WITHOUT HISTORY OF HTN: ICD-10-CM

## 2024-02-19 LAB
ALBUMIN SERPL BCP-MCNC: 3.7 G/DL (ref 3.5–5.2)
ALP SERPL-CCNC: 129 U/L (ref 55–135)
ALT SERPL W/O P-5'-P-CCNC: 75 U/L (ref 10–44)
ANION GAP SERPL CALC-SCNC: 15 MMOL/L (ref 8–16)
ANION GAP SERPL CALC-SCNC: 16 MMOL/L (ref 8–16)
AST SERPL-CCNC: 123 U/L (ref 10–40)
BASOPHILS # BLD AUTO: 0.13 K/UL (ref 0–0.2)
BASOPHILS NFR BLD: 2 % (ref 0–1.9)
BILIRUB SERPL-MCNC: 2.5 MG/DL (ref 0.1–1)
BUN SERPL-MCNC: 4 MG/DL (ref 6–20)
BUN SERPL-MCNC: 5 MG/DL (ref 6–20)
CALCIUM SERPL-MCNC: 9 MG/DL (ref 8.7–10.5)
CALCIUM SERPL-MCNC: 9.2 MG/DL (ref 8.7–10.5)
CHLORIDE SERPL-SCNC: 107 MMOL/L (ref 95–110)
CHLORIDE SERPL-SCNC: 109 MMOL/L (ref 95–110)
CO2 SERPL-SCNC: 19 MMOL/L (ref 23–29)
CO2 SERPL-SCNC: 22 MMOL/L (ref 23–29)
CREAT SERPL-MCNC: 1 MG/DL (ref 0.5–1.4)
CREAT SERPL-MCNC: 1.1 MG/DL (ref 0.5–1.4)
DIFFERENTIAL METHOD BLD: ABNORMAL
EOSINOPHIL # BLD AUTO: 0.2 K/UL (ref 0–0.5)
EOSINOPHIL NFR BLD: 3.7 % (ref 0–8)
ERYTHROCYTE [DISTWIDTH] IN BLOOD BY AUTOMATED COUNT: 12.6 % (ref 11.5–14.5)
EST. GFR  (NO RACE VARIABLE): >60 ML/MIN/1.73 M^2
EST. GFR  (NO RACE VARIABLE): >60 ML/MIN/1.73 M^2
GLUCOSE SERPL-MCNC: 100 MG/DL (ref 70–110)
GLUCOSE SERPL-MCNC: 96 MG/DL (ref 70–110)
HCT VFR BLD AUTO: 40.6 % (ref 40–54)
HCV AB SERPL QL IA: POSITIVE
HEP C VIRUS HOLD SPECIMEN: NORMAL
HGB BLD-MCNC: 14.6 G/DL (ref 14–18)
HIV 1+2 AB+HIV1 P24 AG SERPL QL IA: NEGATIVE
IMM GRANULOCYTES # BLD AUTO: 0.01 K/UL (ref 0–0.04)
IMM GRANULOCYTES NFR BLD AUTO: 0.2 % (ref 0–0.5)
LIPASE SERPL-CCNC: 55 U/L (ref 4–60)
LYMPHOCYTES # BLD AUTO: 3.3 K/UL (ref 1–4.8)
LYMPHOCYTES NFR BLD: 50.5 % (ref 18–48)
MAGNESIUM SERPL-MCNC: 1.1 MG/DL (ref 1.6–2.6)
MCH RBC QN AUTO: 32 PG (ref 27–31)
MCHC RBC AUTO-ENTMCNC: 36 G/DL (ref 32–36)
MCV RBC AUTO: 89 FL (ref 82–98)
MONOCYTES # BLD AUTO: 0.6 K/UL (ref 0.3–1)
MONOCYTES NFR BLD: 9.1 % (ref 4–15)
NEUTROPHILS # BLD AUTO: 2.2 K/UL (ref 1.8–7.7)
NEUTROPHILS NFR BLD: 34.5 % (ref 38–73)
NRBC BLD-RTO: 0 /100 WBC
OHS QRS DURATION: 94 MS
OHS QTC CALCULATION: 450 MS
PLATELET # BLD AUTO: 190 K/UL (ref 150–450)
PMV BLD AUTO: 10.6 FL (ref 9.2–12.9)
POTASSIUM SERPL-SCNC: 2.8 MMOL/L (ref 3.5–5.1)
POTASSIUM SERPL-SCNC: 3.1 MMOL/L (ref 3.5–5.1)
PROT SERPL-MCNC: 7.5 G/DL (ref 6–8.4)
RBC # BLD AUTO: 4.56 M/UL (ref 4.6–6.2)
SODIUM SERPL-SCNC: 144 MMOL/L (ref 136–145)
SODIUM SERPL-SCNC: 144 MMOL/L (ref 136–145)
TROPONIN I SERPL DL<=0.01 NG/ML-MCNC: 0.01 NG/ML (ref 0–0.03)
WBC # BLD AUTO: 6.47 K/UL (ref 3.9–12.7)

## 2024-02-19 PROCEDURE — 99284 EMERGENCY DEPT VISIT MOD MDM: CPT | Mod: 25

## 2024-02-19 PROCEDURE — 83690 ASSAY OF LIPASE: CPT | Performed by: EMERGENCY MEDICINE

## 2024-02-19 PROCEDURE — 93010 ELECTROCARDIOGRAM REPORT: CPT | Mod: ,,, | Performed by: INTERNAL MEDICINE

## 2024-02-19 PROCEDURE — 96365 THER/PROPH/DIAG IV INF INIT: CPT

## 2024-02-19 PROCEDURE — 25000003 PHARM REV CODE 250: Performed by: EMERGENCY MEDICINE

## 2024-02-19 PROCEDURE — 87522 HEPATITIS C REVRS TRNSCRPJ: CPT | Performed by: EMERGENCY MEDICINE

## 2024-02-19 PROCEDURE — 96367 TX/PROPH/DG ADDL SEQ IV INF: CPT

## 2024-02-19 PROCEDURE — 84484 ASSAY OF TROPONIN QUANT: CPT | Performed by: EMERGENCY MEDICINE

## 2024-02-19 PROCEDURE — 87389 HIV-1 AG W/HIV-1&-2 AB AG IA: CPT | Performed by: EMERGENCY MEDICINE

## 2024-02-19 PROCEDURE — 85025 COMPLETE CBC W/AUTO DIFF WBC: CPT | Performed by: EMERGENCY MEDICINE

## 2024-02-19 PROCEDURE — 96366 THER/PROPH/DIAG IV INF ADDON: CPT

## 2024-02-19 PROCEDURE — 86803 HEPATITIS C AB TEST: CPT | Performed by: EMERGENCY MEDICINE

## 2024-02-19 PROCEDURE — 96375 TX/PRO/DX INJ NEW DRUG ADDON: CPT

## 2024-02-19 PROCEDURE — 63600175 PHARM REV CODE 636 W HCPCS: Performed by: EMERGENCY MEDICINE

## 2024-02-19 PROCEDURE — 93005 ELECTROCARDIOGRAM TRACING: CPT

## 2024-02-19 PROCEDURE — 80053 COMPREHEN METABOLIC PANEL: CPT | Performed by: EMERGENCY MEDICINE

## 2024-02-19 PROCEDURE — 36415 COLL VENOUS BLD VENIPUNCTURE: CPT | Performed by: EMERGENCY MEDICINE

## 2024-02-19 PROCEDURE — 83735 ASSAY OF MAGNESIUM: CPT | Performed by: EMERGENCY MEDICINE

## 2024-02-19 PROCEDURE — 80048 BASIC METABOLIC PNL TOTAL CA: CPT | Mod: XB | Performed by: EMERGENCY MEDICINE

## 2024-02-19 RX ORDER — DIAZEPAM 10 MG/2ML
5 INJECTION INTRAMUSCULAR
Status: COMPLETED | OUTPATIENT
Start: 2024-02-19 | End: 2024-02-19

## 2024-02-19 RX ORDER — MAGNESIUM SULFATE HEPTAHYDRATE 40 MG/ML
2 INJECTION, SOLUTION INTRAVENOUS
Status: COMPLETED | OUTPATIENT
Start: 2024-02-19 | End: 2024-02-19

## 2024-02-19 RX ORDER — POTASSIUM CHLORIDE 7.45 MG/ML
10 INJECTION INTRAVENOUS
Status: COMPLETED | OUTPATIENT
Start: 2024-02-19 | End: 2024-02-19

## 2024-02-19 RX ORDER — POTASSIUM CHLORIDE 20 MEQ/1
40 TABLET, EXTENDED RELEASE ORAL
Status: COMPLETED | OUTPATIENT
Start: 2024-02-19 | End: 2024-02-19

## 2024-02-19 RX ORDER — CHLORDIAZEPOXIDE HYDROCHLORIDE 25 MG/1
25 CAPSULE, GELATIN COATED ORAL
Status: COMPLETED | OUTPATIENT
Start: 2024-02-19 | End: 2024-02-19

## 2024-02-19 RX ORDER — CHLORDIAZEPOXIDE HYDROCHLORIDE 25 MG/1
CAPSULE, GELATIN COATED ORAL
Qty: 7 CAPSULE | Refills: 0 | Status: SHIPPED | OUTPATIENT
Start: 2024-02-19 | End: 2024-02-22

## 2024-02-19 RX ORDER — GABAPENTIN 300 MG/1
300 CAPSULE ORAL
Status: COMPLETED | OUTPATIENT
Start: 2024-02-19 | End: 2024-02-19

## 2024-02-19 RX ADMIN — POTASSIUM CHLORIDE 40 MEQ: 1500 TABLET, EXTENDED RELEASE ORAL at 06:02

## 2024-02-19 RX ADMIN — CHLORDIAZEPOXIDE HYDROCHLORIDE 25 MG: 25 CAPSULE ORAL at 01:02

## 2024-02-19 RX ADMIN — GABAPENTIN 300 MG: 300 CAPSULE ORAL at 08:02

## 2024-02-19 RX ADMIN — MAGNESIUM SULFATE 2 G: 2 INJECTION INTRAVENOUS at 09:02

## 2024-02-19 RX ADMIN — DIAZEPAM 5 MG: 5 INJECTION, SOLUTION INTRAMUSCULAR; INTRAVENOUS at 05:02

## 2024-02-19 RX ADMIN — POTASSIUM CHLORIDE 10 MEQ: 7.46 INJECTION, SOLUTION INTRAVENOUS at 07:02

## 2024-02-19 RX ADMIN — POTASSIUM CHLORIDE 10 MEQ: 7.46 INJECTION, SOLUTION INTRAVENOUS at 01:02

## 2024-02-19 NOTE — ED PROVIDER NOTES
SCRIBE #1 NOTE: I, Gisel Jaime, am scribing for, and in the presence of, Andrew Stock MD. I have scribed the HPI, ROS, and PEx.     SCRIBE #2 NOTE: I, Rory Torres, am scribing for, and in the presence of,  Jacob Trinh MD. I have scribed the remaining portions of the note not scribed by Scribe #1.      History     Chief Complaint   Patient presents with    Withdrawal     Pt had last drink of ETOH 2 days ago with a plan to go to rehab. He is now reporting nausea, tremors, and hallucinations.     Review of patient's allergies indicates:   Allergen Reactions    Fish containing products     Shrimp          History of Present Illness     HPI    2/19/2024, 5:03 AM  History obtained from the patient      History of Present Illness: Flako Quintana is a 43 y.o. 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 The patient is c/o nausea, generalized myalgia, and tremors to the bilateral hands. He reports that his last drink was 2 days ago. He used to drink daily. He plans on going to rehab. Symptoms are constant and moderate in severity. No mitigating or exacerbating factors reported. Patient denies any vomiting, seizures, headache, CP, SOB, and all other sxs at this time. No prior Tx reported. No further complaints or concerns at this time.       Arrival mode: [facility]    PCP: Leopoldo Lim MD        Past Medical History:  Past Medical History:   Diagnosis Date    Chronic liver disease due to alcohol 8/28/2019    Diabetes mellitus     Hypertension     MRSA bacteremia 6/8/2020    Osteomyelitis of right foot 03/2020    MRSA    Type 2 diabetes mellitus with hyperglycemia, with long-term current use of insulin 8/28/2019       Past Surgical History:  Past Surgical History:   Procedure Laterality Date    DEBRIDEMENT OF FOOT Bilateral 12/9/2019    Procedure: DEBRIDEMENT, FOOT;   Surgeon: Yesenia Buck DPM;  Location: [facility];  Service: Podiatry;  Laterality: Bilateral;         Family History:  Family History   Problem Relation Age of Onset    Diabetes Mother     COPD Mother     No Known Problems Father     Cancer Sister     Diabetes Brother     Alcohol abuse Maternal Uncle        Social History:  Social History     Tobacco Use    Smoking status: Every Day     Current packs/day: 0.30     Types: Cigarettes    Smokeless tobacco: Never   Substance and Sexual Activity    Alcohol use: Yes     Alcohol/week: 6.0 standard drinks of alcohol     Types: 6 Cans of beer per week    Drug use: Yes    Sexual activity: Yes        Review of Systems     Review of Systems   Constitutional:  Negative for fever.   HENT:  Negative for sore throat.    Respiratory:  Negative for shortness of breath.    Cardiovascular:  Negative for chest pain.   Gastrointestinal:  Positive for nausea. Negative for vomiting.   Genitourinary:  Negative for dysuria.   Musculoskeletal:  Positive for myalgias (generalized). Negative for back pain.   Skin:  Negative for rash.   Neurological:  Positive for tremors (hands). Negative for seizures, weakness and headaches.   Hematological:  Does not bruise/bleed easily.   All other systems reviewed and are negative.     Physical Exam     Initial Vitals [02/19/24 0439]   BP Pulse Resp Temp SpO2   (!) 160/94 108 18 99 °F (37.2 °C) 95 %      MAP       --          Physical Exam  Nursing Notes and Vital Signs Reviewed.  Constitutional: Patient is in no acute distress. Well-developed and well-nourished.   Head: Atraumatic. Normocephalic.  Eyes: PERRL. EOM intact. Conjunctivae are not pale. No scleral icterus.  ENT: Mucous membranes are moist. Oropharynx is clear and symmetric.    Neck: Supple. Full ROM. No lymphadenopathy.  Cardiovascular: Regular rate. Regular rhythm. No murmurs, rubs, or gallops. Distal pulses are 2+ and symmetric.  Pulmonary/Chest: No respiratory distress. Clear to auscultation  bilaterally. No wheezing or rales.  Abdominal: Soft and non-distended.  There is no tenderness.  No rebound, guarding, or rigidity. Good bowel sounds.  Genitourinary: No CVA tenderness  Musculoskeletal: Moves all extremities. No obvious deformities. No edema. No calf tenderness.  Skin: Warm and dry.  Neurological:  Alert, awake, and appropriate.  Normal speech.  No acute focal neurological deficits are appreciated.  Psychiatric: Normal affect. Good eye contact. Appropriate in content.     ED Course   Critical Care    Date/Time: 2/19/2024 5:50 PM    Performed by: Jacob Trinh MD  Authorized by: Jacob Trinh MD  Direct patient critical care time: 5 minutes  Additional history critical care time: 6 minutes  Ordering / reviewing critical care time: 15 minutes  Documentation critical care time: 3 minutes  Consulting other physicians critical care time: 2 minutes  Other critical care time: 4 minutes  Total critical care time (exclusive of procedural time) : 35 minutes  Critical care time was exclusive of separately billable procedures and treating other patients and teaching time.  Critical care was necessary to treat or prevent imminent or life-threatening deterioration of the following conditions: Critical electrolyte abnormalities.  Critical care was time spent personally by me on the following activities: blood draw for specimens, development of treatment plan with patient or surrogate, interpretation of cardiac output measurements, evaluation of patient's response to treatment, examination of patient, obtaining history from patient or surrogate, ordering and performing treatments and interventions, ordering and review of laboratory studies, ordering and review of radiographic studies, pulse oximetry, re-evaluation of patient's condition and review of old charts.        ED Vital Signs:  Vitals:    02/19/24 0439 02/19/24 0445 02/19/24 0532 02/19/24 0602   BP: (!) 160/94 (!) 175/97 (!) 164/90 (!) 154/84   Pulse:  "108 104 91 90   Resp: 18 19 17 20   Temp: 99 °F (37.2 °C)      TempSrc: Oral      SpO2: 95% 97% 96% 96%   Height: 6' 2" (1.88 m)       02/19/24 0700 02/19/24 0730 02/19/24 0830 02/19/24 0930   BP: (!) 170/100 (!) 168/94 (!) 170/94 (!) 165/80   Pulse: 96 100 97 97   Resp: (!) 21 20 20 (!) 21   Temp:       TempSrc:       SpO2: 97% 98% 95% (!) 94%   Height:        02/19/24 1030 02/19/24 1130 02/19/24 1230 02/19/24 1330   BP: (!) 178/88 (!) 189/95 (!) 176/91 (!) 184/95   Pulse: 104 97 101 97   Resp: 20 19 20 (!) 22   Temp:       TempSrc:       SpO2: 97% 96% 95% 98%   Height:        02/19/24 1430 02/19/24 1530   BP: (!) 173/93 (!) 180/88   Pulse: 91 107   Resp: 20 18   Temp:     TempSrc:     SpO2: 96% 97%   Height:         Abnormal Lab Results:  Labs Reviewed   HEPATITIS C ANTIBODY - Abnormal; Notable for the following components:       Result Value    Hepatitis C Ab Positive (*)     All other components within normal limits    Narrative:     Release to patient->Immediate   CBC W/ AUTO DIFFERENTIAL - Abnormal; Notable for the following components:    RBC 4.56 (*)     MCH 32.0 (*)     Gran % 34.5 (*)     Lymph % 50.5 (*)     Basophil % 2.0 (*)     All other components within normal limits   COMPREHENSIVE METABOLIC PANEL - Abnormal; Notable for the following components:    Potassium 2.8 (*)     CO2 22 (*)     BUN 4 (*)     Total Bilirubin 2.5 (*)      (*)     ALT 75 (*)     All other components within normal limits   MAGNESIUM - Abnormal; Notable for the following components:    Magnesium 1.1 (*)     All other components within normal limits   BASIC METABOLIC PANEL - Abnormal; Notable for the following components:    Potassium 3.1 (*)     CO2 19 (*)     BUN 5 (*)     All other components within normal limits   HIV 1 / 2 ANTIBODY    Narrative:     Release to patient->Immediate   HEP C VIRUS HOLD SPECIMEN    Narrative:     Release to patient->Immediate   LIPASE   TROPONIN I   MAGNESIUM   HEPATITIS C RNA, QUANTITATIVE, " PCR        All Lab Results:  Results for orders placed or performed during the hospital encounter of 02/19/24   HIV 1/2 Ag/Ab (4th Gen)   Result Value Ref Range    HIV 1/2 Ag/Ab Negative Negative   Hepatitis C Antibody   Result Value Ref Range    Hepatitis C Ab Positive (A) Negative   HCV Virus Hold Specimen   Result Value Ref Range    HEP C Virus Hold Specimen Hold for HCV sendout    CBC auto differential   Result Value Ref Range    WBC 6.47 3.90 - 12.70 K/uL    RBC 4.56 (L) 4.60 - 6.20 M/uL    Hemoglobin 14.6 14.0 - 18.0 g/dL    Hematocrit 40.6 40.0 - 54.0 %    MCV 89 82 - 98 fL    MCH 32.0 (H) 27.0 - 31.0 pg    MCHC 36.0 32.0 - 36.0 g/dL    RDW 12.6 11.5 - 14.5 %    Platelets 190 150 - 450 K/uL    MPV 10.6 9.2 - 12.9 fL    Immature Granulocytes 0.2 0.0 - 0.5 %    Gran # (ANC) 2.2 1.8 - 7.7 K/uL    Immature Grans (Abs) 0.01 0.00 - 0.04 K/uL    Lymph # 3.3 1.0 - 4.8 K/uL    Mono # 0.6 0.3 - 1.0 K/uL    Eos # 0.2 0.0 - 0.5 K/uL    Baso # 0.13 0.00 - 0.20 K/uL    nRBC 0 0 /100 WBC    Gran % 34.5 (L) 38.0 - 73.0 %    Lymph % 50.5 (H) 18.0 - 48.0 %    Mono % 9.1 4.0 - 15.0 %    Eosinophil % 3.7 0.0 - 8.0 %    Basophil % 2.0 (H) 0.0 - 1.9 %    Differential Method Automated    Comprehensive metabolic panel   Result Value Ref Range    Sodium 144 136 - 145 mmol/L    Potassium 2.8 (L) 3.5 - 5.1 mmol/L    Chloride 107 95 - 110 mmol/L    CO2 22 (L) 23 - 29 mmol/L    Glucose 100 70 - 110 mg/dL    BUN 4 (L) 6 - 20 mg/dL    Creatinine 1.1 0.5 - 1.4 mg/dL    Calcium 9.2 8.7 - 10.5 mg/dL    Total Protein 7.5 6.0 - 8.4 g/dL    Albumin 3.7 3.5 - 5.2 g/dL    Total Bilirubin 2.5 (H) 0.1 - 1.0 mg/dL    Alkaline Phosphatase 129 55 - 135 U/L     (H) 10 - 40 U/L    ALT 75 (H) 10 - 44 U/L    eGFR >60 >60 mL/min/1.73 m^2    Anion Gap 15 8 - 16 mmol/L   Lipase   Result Value Ref Range    Lipase 55 4 - 60 U/L   Troponin I   Result Value Ref Range    Troponin I 0.015 0.000 - 0.026 ng/mL   Magnesium   Result Value Ref Range    Magnesium  1.1 (L) 1.6 - 2.6 mg/dL   Basic metabolic panel   Result Value Ref Range    Sodium 144 136 - 145 mmol/L    Potassium 3.1 (L) 3.5 - 5.1 mmol/L    Chloride 109 95 - 110 mmol/L    CO2 19 (L) 23 - 29 mmol/L    Glucose 96 70 - 110 mg/dL    BUN 5 (L) 6 - 20 mg/dL    Creatinine 1.0 0.5 - 1.4 mg/dL    Calcium 9.0 8.7 - 10.5 mg/dL    Anion Gap 16 8 - 16 mmol/L    eGFR >60 >60 mL/min/1.73 m^2   EKG 12-lead   Result Value Ref Range    QRS Duration 94 ms    OHS QTC Calculation 450 ms         Imaging Results:  Imaging Results    None          The EKG was ordered, reviewed, and independently interpreted by the ED provider.  Interpretation time: 05:11  Rate: 77 BPM  Rhythm: sinus rhythm with marked sinus arrhythmia  Interpretation: Minimal voltage criteria for LVH, may be normal variant. Nonspecific ST abnormality. No STEMI.           The Emergency Provider reviewed the vital signs and test results, which are outlined above.     ED Discussion     5:59 AM: CIWA score: 5.     6:00 AM: Dr. Stock transfers care of patient to Dr. Trinh pending lab results.    1:58 PM: Reassessed pt at this time.  Pt states his condition has improved at this time. Discussed with pt all pertinent ED information and results. Discussed pt dx and plan of tx. Gave pt all f/u and return to the ED instructions. All questions and concerns were addressed at this time. Pt expresses understanding of information and instructions, and is comfortable with plan to discharge. Pt is stable for discharge.    I discussed with patient and/or family/caretaker that evaluation in the ED does not suggest any emergent or life threatening medical conditions requiring immediate intervention beyond what was provided in the ED, and I believe patient is safe for discharge.  Regardless, an unremarkable evaluation in the ED does not preclude the development or presence of a serious of life threatening condition. As such, patient was instructed to return immediately for any worsening  or change in current symptoms.         Medical Decision Making  Amount and/or Complexity of Data Reviewed  Labs: ordered. Decision-making details documented in ED Course.  ECG/medicine tests: ordered and independent interpretation performed. Decision-making details documented in ED Course.    Risk  Prescription drug management.                ED Medication(s):  Medications   diazePAM injection 5 mg (5 mg Intravenous Given 2/19/24 0512)   potassium chloride SA CR tablet 40 mEq (40 mEq Oral Given 2/19/24 0602)   magnesium sulfate 2g in water 50mL IVPB (premix) (0 g Intravenous Stopped 2/19/24 1110)   potassium chloride 10 mEq in 100 mL IVPB (0 mEq Intravenous Stopped 2/19/24 0900)   gabapentin capsule 300 mg (300 mg Oral Given 2/19/24 0808)   chlordiazepoxide capsule 25 mg (25 mg Oral Given 2/19/24 1314)   potassium chloride 10 mEq in 100 mL IVPB (0 mEq Intravenous Stopped 2/19/24 1418)       New Prescriptions    CHLORDIAZEPOXIDE (LIBRIUM) 25 MG CAP    Take 1 capsule (25 mg total) by mouth every 6 (six) hours for 1 day, THEN 1 capsule (25 mg total) every 12 (twelve) hours for 1 day, THEN 1 capsule (25 mg total) every evening for 1 day.    POTASSIUM BICARBONATE (K-LYTE) DISINTEGRATING TABLET    Take 1 tablet (25 mEq total) by mouth once daily. for 7 days        Follow-up Information       Leopoldo Lim MD. Schedule an appointment as soon as possible for a visit in 2 days.    Specialty: Internal Medicine  Why: For re-evaluation and further treatment  Contact information:  [address] [facility] [address] [phone]               [facility] - Emergency Dept.. Go today.    Specialty: Emergency Medicine  Why: If symptoms worsen, For re-evaluation and further treatment, As needed  Contact information:  [address] [facility] [address] [phone]                               Scribe Attestation:   Scribe #1: I performed the above scribed service and the documentation accurately  describes the services I performed. I attest to the accuracy of the note.     Attending:   Physician Attestation Statement for Scribe #1: I, Andrew Stock MD, personally performed the services described in this documentation, as scribed by Gisel Jaime, in my presence, and it is both accurate and complete.       Scribe Attestation:   Scribe #2: I performed the above scribed service and the documentation accurately describes the services I performed. I attest to the accuracy of the note.    Attending Attestation:           Physician Attestation for Scribe:    Physician Attestation Statement for Scribe #2: I, Jacob Trinh MD, reviewed documentation, as scribed by Rory Torres in my presence, and it is both accurate and complete. I also acknowledge and confirm the content of the note done by Scribe #1.           Clinical Impression       ICD-10-CM ICD-9-CM   1. Hypomagnesemia  E83.42 275.2   2. Blood pressure elevated without history of HTN  R03.0 796.2   3. Hypokalemia  E87.6 276.8   4. Alcohol withdrawal syndrome without complication  F10.930 291.81       Disposition:   Disposition: Discharged  Condition: Stable         Jacob Trinh MD  02/19/24 [id number]

## 2024-02-20 LAB
HCV RNA SERPL QL NAA+PROBE: NOT DETECTED
HCV RNA SPEC NAA+PROBE-ACNC: NOT DETECTED IU/ML

## (undated) DEVICE — NEEDLE SAFETY ECLIPSE 25G 1-1/2IN 305767

## (undated) DEVICE — BANDAGE ACE STERILE 4 REB3114

## (undated) DEVICE — COVER LIGHT HANDLE LB53

## (undated) DEVICE — SPONGE GAUZE 10S 4X4  442214

## (undated) DEVICE — TRAY LOWER EXTREMITY  SMHS029-05

## (undated) DEVICE — BANDAGE SOFTBAND 4 441506

## (undated) DEVICE — SHOE POST OP MALE MEDIUM

## (undated) DEVICE — SOLUTION SCRUB IODINE 4OZ

## (undated) DEVICE — GLOVE BIOGEL PI GOLD SZ 6.5

## (undated) DEVICE — TUBE CULTURE AMIES 220117

## (undated) DEVICE — SOLUTION IRRI NS BOTTLE 1000ML R5200-01

## (undated) DEVICE — WALKER ANKLE MEDIUM

## (undated) DEVICE — DRESSING ADAPTIC 3X3 6112

## (undated) DEVICE — TRAY SKIN PREP DRY

## (undated) DEVICE — PAD BOVIE ADULT

## (undated) DEVICE — DRAPE LIMB BILATERAL 89291

## (undated) DEVICE — SOLUTION PREP IODINE 4OZ